# Patient Record
Sex: FEMALE | Race: OTHER | Employment: FULL TIME | URBAN - METROPOLITAN AREA
[De-identification: names, ages, dates, MRNs, and addresses within clinical notes are randomized per-mention and may not be internally consistent; named-entity substitution may affect disease eponyms.]

---

## 2017-02-08 ENCOUNTER — HOSPITAL ENCOUNTER (EMERGENCY)
Age: 52
Discharge: HOME | End: 2017-02-08
Attending: EMERGENCY MEDICINE
Payer: MEDICAID

## 2017-02-08 VITALS
WEIGHT: 211 LBS | SYSTOLIC BLOOD PRESSURE: 160 MMHG | DIASTOLIC BLOOD PRESSURE: 98 MMHG | HEART RATE: 84 BPM | HEIGHT: 62 IN | OXYGEN SATURATION: 96 % | RESPIRATION RATE: 18 BRPM | BODY MASS INDEX: 38.83 KG/M2 | TEMPERATURE: 99.3 F

## 2017-02-08 DIAGNOSIS — G89.29 CHRONIC BILATERAL LOW BACK PAIN WITHOUT SCIATICA: Primary | ICD-10-CM

## 2017-02-08 DIAGNOSIS — M54.50 CHRONIC BILATERAL LOW BACK PAIN WITHOUT SCIATICA: Primary | ICD-10-CM

## 2017-02-08 PROCEDURE — 99283 EMERGENCY DEPT VISIT LOW MDM: CPT

## 2017-02-08 RX ORDER — GABAPENTIN 300 MG/1
300 CAPSULE ORAL 3 TIMES DAILY
Qty: 30 CAPSULE | Refills: 0 | Status: SHIPPED | OUTPATIENT
Start: 2017-02-08 | End: 2017-10-18 | Stop reason: ALTCHOICE

## 2017-02-08 RX ORDER — TRAMADOL HYDROCHLORIDE 50 MG/1
50 TABLET ORAL EVERY 6 HOURS PRN
COMMUNITY
End: 2017-10-18 | Stop reason: ALTCHOICE

## 2017-02-08 RX ORDER — PREDNISONE 50 MG/1
50 TABLET ORAL DAILY
Qty: 5 TABLET | Refills: 0 | Status: SHIPPED | OUTPATIENT
Start: 2017-02-08 | End: 2017-02-13

## 2017-02-08 RX ORDER — CYCLOBENZAPRINE HCL 10 MG
10 TABLET ORAL 3 TIMES DAILY PRN
Qty: 30 TABLET | Refills: 0 | Status: SHIPPED | OUTPATIENT
Start: 2017-02-08 | End: 2017-02-18

## 2017-02-08 ASSESSMENT — PAIN DESCRIPTION - LOCATION: LOCATION: BACK

## 2017-02-08 ASSESSMENT — PAIN SCALES - GENERAL: PAINLEVEL_OUTOF10: 8

## 2017-02-08 ASSESSMENT — PAIN DESCRIPTION - DESCRIPTORS: DESCRIPTORS: ACHING

## 2017-10-18 ENCOUNTER — HOSPITAL ENCOUNTER (OUTPATIENT)
Age: 52
Discharge: HOME | End: 2017-10-18
Payer: MEDICAID

## 2017-10-18 ENCOUNTER — OFFICE VISIT (OUTPATIENT)
Dept: PRIMARY CARE CLINIC | Age: 52
End: 2017-10-18
Payer: MEDICAID

## 2017-10-18 VITALS
RESPIRATION RATE: 16 BRPM | HEIGHT: 62 IN | BODY MASS INDEX: 39.75 KG/M2 | WEIGHT: 216 LBS | SYSTOLIC BLOOD PRESSURE: 162 MMHG | HEART RATE: 82 BPM | OXYGEN SATURATION: 95 % | DIASTOLIC BLOOD PRESSURE: 104 MMHG

## 2017-10-18 DIAGNOSIS — E03.9 HYPOTHYROIDISM, ADULT: ICD-10-CM

## 2017-10-18 DIAGNOSIS — E78.2 HYPERLIPIDEMIA, MIXED: ICD-10-CM

## 2017-10-18 DIAGNOSIS — K76.0 NAFLD (NONALCOHOLIC FATTY LIVER DISEASE): ICD-10-CM

## 2017-10-18 DIAGNOSIS — K76.0 NONALCOHOLIC FATTY LIVER DISEASE WITHOUT NONALCOHOLIC STEATOHEPATITIS (NASH): ICD-10-CM

## 2017-10-18 DIAGNOSIS — E66.9 OBESITY (BMI 35.0-39.9 WITHOUT COMORBIDITY): Primary | ICD-10-CM

## 2017-10-18 DIAGNOSIS — Z13.1 ENCOUNTER FOR SCREENING FOR DIABETES MELLITUS: ICD-10-CM

## 2017-10-18 DIAGNOSIS — E66.9 OBESITY (BMI 35.0-39.9 WITHOUT COMORBIDITY): ICD-10-CM

## 2017-10-18 DIAGNOSIS — E27.8 ADRENAL MASS, LEFT (HCC): ICD-10-CM

## 2017-10-18 DIAGNOSIS — Z12.11 SCREENING FOR COLON CANCER: ICD-10-CM

## 2017-10-18 DIAGNOSIS — Z12.31 ENCOUNTER FOR SCREENING MAMMOGRAM FOR BREAST CANCER: ICD-10-CM

## 2017-10-18 DIAGNOSIS — I10 ESSENTIAL HYPERTENSION: ICD-10-CM

## 2017-10-18 LAB
ANION GAP SERPL CALCULATED.3IONS-SCNC: 15 MMOL/L (ref 9–17)
BUN BLDV-MCNC: 16 MG/DL (ref 6–20)
BUN/CREAT BLD: ABNORMAL (ref 9–20)
CALCIUM SERPL-MCNC: 9.1 MG/DL (ref 8.6–10.4)
CHLORIDE BLD-SCNC: 103 MMOL/L (ref 98–107)
CHOLESTEROL, FASTING: 278 MG/DL
CHOLESTEROL/HDL RATIO: 5.7
CO2: 23 MMOL/L (ref 20–31)
CREAT SERPL-MCNC: 0.62 MG/DL (ref 0.5–0.9)
ESTIMATED AVERAGE GLUCOSE: 108 MG/DL
GFR AFRICAN AMERICAN: >60 ML/MIN
GFR NON-AFRICAN AMERICAN: >60 ML/MIN
GFR SERPL CREATININE-BSD FRML MDRD: ABNORMAL ML/MIN/{1.73_M2}
GFR SERPL CREATININE-BSD FRML MDRD: ABNORMAL ML/MIN/{1.73_M2}
GLUCOSE BLD-MCNC: 100 MG/DL (ref 70–99)
GLUCOSE FASTING: 100 MG/DL (ref 70–99)
HBA1C MFR BLD: 5.4 % (ref 4–6)
HDLC SERPL-MCNC: 49 MG/DL
LDL CHOLESTEROL: 167 MG/DL (ref 0–130)
POTASSIUM SERPL-SCNC: 4.5 MMOL/L (ref 3.7–5.3)
SODIUM BLD-SCNC: 141 MMOL/L (ref 135–144)
TRIGLYCERIDE, FASTING: 310 MG/DL
VLDLC SERPL CALC-MCNC: ABNORMAL MG/DL (ref 1–30)

## 2017-10-18 PROCEDURE — 80061 LIPID PANEL: CPT

## 2017-10-18 PROCEDURE — 99204 OFFICE O/P NEW MOD 45 MIN: CPT | Performed by: INTERNAL MEDICINE

## 2017-10-18 PROCEDURE — 36415 COLL VENOUS BLD VENIPUNCTURE: CPT

## 2017-10-18 PROCEDURE — 80048 BASIC METABOLIC PNL TOTAL CA: CPT

## 2017-10-18 PROCEDURE — 83036 HEMOGLOBIN GLYCOSYLATED A1C: CPT

## 2017-10-18 RX ORDER — LEVOTHYROXINE SODIUM 0.05 MG/1
50 TABLET ORAL DAILY
Qty: 30 TABLET | Refills: 3 | Status: SHIPPED | OUTPATIENT
Start: 2017-10-18 | End: 2018-04-26 | Stop reason: SDUPTHER

## 2017-10-18 RX ORDER — EZETIMIBE AND SIMVASTATIN 10; 20 MG/1; MG/1
1 TABLET ORAL NIGHTLY
Qty: 30 TABLET | Refills: 3 | Status: SHIPPED | OUTPATIENT
Start: 2017-10-18 | End: 2018-05-03

## 2017-10-18 RX ORDER — METOPROLOL TARTRATE 50 MG/1
50 TABLET, FILM COATED ORAL 2 TIMES DAILY
Qty: 60 TABLET | Refills: 3 | Status: SHIPPED | OUTPATIENT
Start: 2017-10-18 | End: 2018-11-01 | Stop reason: SDUPTHER

## 2017-10-18 ASSESSMENT — ENCOUNTER SYMPTOMS
VOICE CHANGE: 0
SHORTNESS OF BREATH: 0
CONSTIPATION: 0
APNEA: 0
BACK PAIN: 0
EYE PAIN: 0
FACIAL SWELLING: 0
VOMITING: 0
DIARRHEA: 0
TROUBLE SWALLOWING: 0
NAUSEA: 0
COUGH: 0
COLOR CHANGE: 0
ABDOMINAL PAIN: 0

## 2017-10-18 ASSESSMENT — PATIENT HEALTH QUESTIONNAIRE - PHQ9
SUM OF ALL RESPONSES TO PHQ9 QUESTIONS 1 & 2: 0
2. FEELING DOWN, DEPRESSED OR HOPELESS: 0
1. LITTLE INTEREST OR PLEASURE IN DOING THINGS: 0
SUM OF ALL RESPONSES TO PHQ QUESTIONS 1-9: 0

## 2017-10-18 NOTE — PROGRESS NOTES
explained to patient. Orders Placed This Encounter   Procedures    GAMALIEL Digital Screen Bilateral [[id number]]     Standing Status:   Future     Standing Expiration Date:   10/18/2018    Glucose, Fasting     Standing Status:   Future     Number of Occurrences:   1     Standing Expiration Date:   10/18/2018    Lipid, Fasting     Standing Status:   Future     Number of Occurrences:   1     Standing Expiration Date:   10/18/2018    Hemoglobin A1C - NOT COVERED /DO NOT USE FOR MEDICARE PATIENTS     Standing Status:   Future     Number of Occurrences:   1     Standing Expiration Date:   10/18/2018    TSH with Reflex    Basic Metabolic Panel     Standing Status:   Future     Number of Occurrences:   1     Standing Expiration Date:   10/18/2018    POCT FECAL IMMUNOCHEMICAL TEST (FIT)     Standing Status:   Future     Standing Expiration Date:   10/18/2018       Counselling/Preventive measures:    Patient does have questions or concerns. We discussed exercise  and efforts to strengthen spine and peripheral joints on a regular basis. Goals for today's visit include ability to engage in daily activities, decrease health care utilization. Decision Making Process : Patient's health history and referral records thoroughly reviewed before focused physical examination and discussion with patient. Over 50% of today's visit is spent on examining the patient and counseling. Level of complexity of date to be reviewed is Moderate. The chart date reviewed include the following: Imaging Reports. Summary of Care. Time spent reviewing with patient the below reports:   Medication safety, Treatment options. Level of diagnosis and management options of this case is multiple: involving the following management options: Interventions as needed, medication management as appropriate, future visits, activity modification, heat/ice as needed, Urine drug screen as required.      Lorie received counseling on the following healthy behaviors: nutrition, exercise and medication adherence    Discussed use, benefit, and side effects of prescribed medications. Barriers to medication compliance addressed. All patient questions answered. Pt voiced understanding. Barriers to medication compliance addressed. Return in  weeks with Justo Page M.D., for Re-evaluation and further plan of treatment.      Asia Monreal M.D.      10/18/2017, 1:19 PM

## 2017-10-19 ENCOUNTER — TELEPHONE (OUTPATIENT)
Dept: PRIMARY CARE CLINIC | Age: 52
End: 2017-10-19

## 2017-10-19 NOTE — TELEPHONE ENCOUNTER
Pt called the office stating that the Vytorin is not covered under Insurance and needs a different medication sent to the pharmacy

## 2017-10-24 ENCOUNTER — TELEPHONE (OUTPATIENT)
Dept: PRIMARY CARE CLINIC | Age: 52
End: 2017-10-24

## 2017-10-24 NOTE — TELEPHONE ENCOUNTER
I advise to make ania with Dr Harpreet Rose sooner this wk   Recommend her to take prilosec 20 mg bid po OTC.

## 2017-10-26 ENCOUNTER — HOSPITAL ENCOUNTER (OUTPATIENT)
Dept: MAMMOGRAPHY | Age: 52
Discharge: HOME | End: 2017-10-26
Payer: MEDICAID

## 2017-10-26 DIAGNOSIS — Z12.31 ENCOUNTER FOR SCREENING MAMMOGRAM FOR BREAST CANCER: ICD-10-CM

## 2017-10-26 PROCEDURE — G0202 SCR MAMMO BI INCL CAD: HCPCS

## 2017-11-02 ENCOUNTER — OFFICE VISIT (OUTPATIENT)
Dept: PRIMARY CARE CLINIC | Age: 52
End: 2017-11-02
Payer: MEDICAID

## 2017-11-02 VITALS
BODY MASS INDEX: 39.93 KG/M2 | DIASTOLIC BLOOD PRESSURE: 82 MMHG | WEIGHT: 217 LBS | SYSTOLIC BLOOD PRESSURE: 138 MMHG | RESPIRATION RATE: 18 BRPM | OXYGEN SATURATION: 96 % | HEART RATE: 110 BPM | HEIGHT: 62 IN

## 2017-11-02 DIAGNOSIS — E03.9 HYPOTHYROIDISM, ADULT: ICD-10-CM

## 2017-11-02 DIAGNOSIS — E78.2 HYPERLIPIDEMIA, MIXED: ICD-10-CM

## 2017-11-02 DIAGNOSIS — K76.0 NONALCOHOLIC FATTY LIVER DISEASE WITHOUT NONALCOHOLIC STEATOHEPATITIS (NASH): Primary | ICD-10-CM

## 2017-11-02 DIAGNOSIS — M46.1 SACROILIITIS (HCC): ICD-10-CM

## 2017-11-02 DIAGNOSIS — I10 ESSENTIAL HYPERTENSION: ICD-10-CM

## 2017-11-02 PROCEDURE — 99214 OFFICE O/P EST MOD 30 MIN: CPT | Performed by: INTERNAL MEDICINE

## 2017-11-02 RX ORDER — SULINDAC 200 MG/1
200 TABLET ORAL 2 TIMES DAILY
Qty: 60 TABLET | Refills: 3 | Status: SHIPPED | OUTPATIENT
Start: 2017-11-02 | End: 2018-11-01 | Stop reason: ALTCHOICE

## 2017-11-02 RX ORDER — METOPROLOL SUCCINATE 100 MG/1
100 TABLET, EXTENDED RELEASE ORAL DAILY
Qty: 30 TABLET | Refills: 3 | Status: SHIPPED | OUTPATIENT
Start: 2017-11-02 | End: 2018-05-31 | Stop reason: SDUPTHER

## 2017-11-02 RX ORDER — EZETIMIBE 10 MG/1
10 TABLET ORAL DAILY
Qty: 30 TABLET | Refills: 3 | Status: SHIPPED | OUTPATIENT
Start: 2017-11-02 | End: 2018-11-01 | Stop reason: ALTCHOICE

## 2017-11-02 RX ORDER — SIMVASTATIN 20 MG
20 TABLET ORAL NIGHTLY
Qty: 30 TABLET | Refills: 3 | Status: SHIPPED | OUTPATIENT
Start: 2017-11-02 | End: 2018-11-01 | Stop reason: ALTCHOICE

## 2017-11-02 NOTE — PROGRESS NOTES
[address]  [address] [facility] [address]  Dept: [phone]  Dept Fax: [phone]    Dr. Gage Holloway    Progress Note    Date of patient's visit: 11/2/2017  YOB: 1965  Patient's Name:  Naveed Art    Patient Care Team:  Yanira Ulrich MD as PCP - General (Internal Medicine)    REASON FOR VISIT:   Patient is here to discuss the following:    Laboratory values and medications. HISTORY OF PRESENT ILLNESS:    History was obtained from the patient. Naveed Art is a 50 y.o. is here for a      Patient Active Problem List   Diagnosis    Essential hypertension    Hyperlipidemia, mixed    Hypothyroidism, adult    Nonalcoholic fatty liver disease without nonalcoholic steatohepatitis (LEIVA)    Obesity (BMI 35.0-39.9 without comorbidity)       HPI    ALLERGIES      Allergies   Allergen Reactions    Pcn [Penicillins]          MEDICATIONS:      Current Outpatient Prescriptions on File Prior to Visit   Medication Sig Dispense Refill    levothyroxine (SYNTHROID) 50 MCG tablet Take 1 tablet by mouth Daily 30 tablet 3    metoprolol tartrate (LOPRESSOR) 50 MG tablet Take 1 tablet by mouth 2 times daily Take one tablet two times a day 12 hours apart for high blood pressure. 60 tablet 3    ezetimibe-simvastatin (VYTORIN) 10-20 MG per tablet Take 1 tablet by mouth nightly 30 tablet 3     No current facility-administered medications on file prior to visit. SOCIAL HISTORY    Reviewed and no change from previous record. [employer]  reports that she has been smoking Cigarettes.   She has never used smokeless tobacco.    FAMILY HISTORY:    Reviewed and No change from previous visit    REVIEW OF SYSTEMS:    ENT: negative  Respiratory: no cough, shortness of breath, or wheezing  Cardiovascular: no chest pain or dyspnea on exertion  Gastrointestinal: no abdominal pain, black or bloody stools  Neurological: no TIA or stroke symptoms  Endocrine: negative  Genito-Urinary: no dysuria, trouble voiding, or hematuria  Musculoskeletal: negative  Dermatological: negative    Review of Systems    PHYSICAL EXAM:      Vitals:    11/02/17 0948   BP: 138/82   Pulse:    Resp:    SpO2:        General appearance - oriented to person, place, and time and in mild to moderate distress  Mental status - alert, oriented to person, place, and time, affect appropriate to mood  Eyes - pupils equal and reactive, extraocular eye movements intact  Back exam - limited range of motion, pain with motion noted during exam, tenderness noted over both sacro iliac joints left worse than the right side;   sensory exam intact bilateral lower extremities  Neurological - alert, oriented, normal speech, no focal findings or movement disorder noted, neck supple without rigidity, motor and sensory grossly normal bilaterally  Musculoskeletal - abnormal exam of right sacro iliac joint and , abnormal exam of left sacro iliac joint. Physical Exam   Musculoskeletal:       Inspection of the sacrum shows the skin to be intact  without any lesions. Palpation  shows tenderness over bilateral  sacroiliac joints. Hip flexion, abduction and external rotation are painful and restricted. bilateral     Jay Jay's test is positive bilateral with reproduction of some of the low back pain. No sensory or motor deficits noted. Tone and muscle strength in lower extremities is normal.     No muscle atrophy noted. Deep tendon reflexes are normal in the lower extremities without hyper reflexia. Skin:          Extremities - no pedal edema noted.     LABORATORY FINDINGS:    CBC:No results found for: WBC, HGB, PLT  BMP:    Lab Results   Component Value Date     10/18/2017    K 4.5 10/18/2017     10/18/2017    CO2 23 10/18/2017    BUN 16 10/18/2017    CREATININE 0.62 10/18/2017    GLUCOSE 100 10/18/2017       HEMOGLOBIN A1C:   Lab Results   Component Value Date LABA1C 5.4 10/18/2017       MICROALBUMIN URINE: No results found for: MICROALBUR    FASTING LIPID PANEL:  Lab Results   Component Value Date    HDL 49 10/18/2017       LIVER PROFILE:No results found for: ALT, AST, PROT, BILITOT, BILIDIR, LABALBU     THYROID FUNCTION: No results found for: TSH     URINE ANALYSIS: No results found for: LABURIN    EKG:  No prior ECG    IMAGING:  Imaging studies have been reviewed in the computerized chart. ASSESSMENT AND PLAN:        1. Nonalcoholic fatty liver disease without nonalcoholic steatohepatitis (LEIVA)    She is making positive changes in her diet, by decreasing carbohydrates. - Lipid Panel    2. Hypothyroidism, adult    She is on supplements. 3. Hyperlipidemia, mixed    We are prescribing again as her previous prescription was not covered. 4. Essential hypertension    Will change her twice a day medication to once a day long acting.   - Lipid Panel    5. Sacroiliitis ([address]    Will start her on prescription NSAID Clinoril 200 mg once a day along with PT. She is already quite active as she manages several tasks at a [employer] and is active on her feet all day. She has also tried activity modifications and stretching exercises without relief. She has tried and is taking OTC analgesics including OTC NSAIDS without any relief. If no improvement will consider pain clinic referral and possible bilateral sacro iliac joint injections.     - PT eval and treat; Future    1. Nonalcoholic fatty liver disease without nonalcoholic steatohepatitis (LEIVA)  Lipid Panel   2. Hypothyroidism, adult     3. Hyperlipidemia, mixed     4. Essential hypertension  Lipid Panel   5. Sacroiliitis ([facility] [address]  PT eval and treat        Treatment Plan: We will continue current medications. Following changes are made to her Rx.     Orders Placed This Encounter   Medications    metoprolol succinate (TOPROL XL) 100 MG extended release tablet     Sig: Take 1 tablet by mouth daily received counseling on the following healthy behaviors: nutrition, exercise and medication adherence         She received counseling on the following healthy behaviors:     1. Medication adherence  2. Patient given educational materials - see patient instructions  3. Discussed use, benefit, and side effects of prescribed medications. Barriers to      medication compliance addressed. All patient questions answered. Pt voiced      understanding. 4.  Reviewed prior labs and health maintenance  5. Continue current medications, diet and exercise. Return in  THREE months with Terry Sweeney M.D., for Re-evaluation and further plan of treatment. Meanwhile I will refer her to the Pain clinic as if she is quite symptomatic with bilateral sacro iliac joint pain. She will meanwhile try PT and continue NSAIDS. She continues to remain physically active.      Carmen Osei M.D.      11/2/2017, 10:32 AM

## 2017-11-16 ENCOUNTER — TELEPHONE (OUTPATIENT)
Dept: PRIMARY CARE CLINIC | Age: 52
End: 2017-11-16

## 2018-01-18 ENCOUNTER — TELEPHONE (OUTPATIENT)
Dept: PRIMARY CARE CLINIC | Age: 53
End: 2018-01-18

## 2018-01-18 ENCOUNTER — OFFICE VISIT (OUTPATIENT)
Dept: PRIMARY CARE CLINIC | Age: 53
End: 2018-01-18
Payer: MEDICAID

## 2018-01-18 VITALS
RESPIRATION RATE: 16 BRPM | HEART RATE: 68 BPM | BODY MASS INDEX: 39.2 KG/M2 | DIASTOLIC BLOOD PRESSURE: 78 MMHG | HEIGHT: 62 IN | OXYGEN SATURATION: 97 % | SYSTOLIC BLOOD PRESSURE: 134 MMHG | WEIGHT: 213 LBS

## 2018-01-18 DIAGNOSIS — I10 ESSENTIAL HYPERTENSION: ICD-10-CM

## 2018-01-18 DIAGNOSIS — E78.2 HYPERLIPIDEMIA, MIXED: ICD-10-CM

## 2018-01-18 DIAGNOSIS — E66.9 OBESITY (BMI 35.0-39.9 WITHOUT COMORBIDITY): ICD-10-CM

## 2018-01-18 DIAGNOSIS — N95.1 VASOMOTOR SYMPTOMS DUE TO MENOPAUSE: ICD-10-CM

## 2018-01-18 DIAGNOSIS — E78.2 MIXED HYPERLIPIDEMIA: Primary | ICD-10-CM

## 2018-01-18 DIAGNOSIS — K76.0 NONALCOHOLIC FATTY LIVER DISEASE WITHOUT NONALCOHOLIC STEATOHEPATITIS (NASH): ICD-10-CM

## 2018-01-18 PROCEDURE — G8484 FLU IMMUNIZE NO ADMIN: HCPCS | Performed by: INTERNAL MEDICINE

## 2018-01-18 PROCEDURE — G8417 CALC BMI ABV UP PARAM F/U: HCPCS | Performed by: INTERNAL MEDICINE

## 2018-01-18 PROCEDURE — G8427 DOCREV CUR MEDS BY ELIG CLIN: HCPCS | Performed by: INTERNAL MEDICINE

## 2018-01-18 PROCEDURE — 4004F PT TOBACCO SCREEN RCVD TLK: CPT | Performed by: INTERNAL MEDICINE

## 2018-01-18 PROCEDURE — 99214 OFFICE O/P EST MOD 30 MIN: CPT | Performed by: INTERNAL MEDICINE

## 2018-01-18 PROCEDURE — 3017F COLORECTAL CA SCREEN DOC REV: CPT | Performed by: INTERNAL MEDICINE

## 2018-01-18 RX ORDER — CLONIDINE HYDROCHLORIDE 0.1 MG/1
0.1 TABLET ORAL NIGHTLY
Qty: 30 TABLET | Refills: 3 | Status: SHIPPED | OUTPATIENT
Start: 2018-01-18 | End: 2018-05-03

## 2018-01-18 RX ORDER — TOPIRAMATE 25 MG/1
25 TABLET ORAL 2 TIMES DAILY
Qty: 60 TABLET | Refills: 3 | Status: SHIPPED | OUTPATIENT
Start: 2018-01-18 | End: 2018-05-03 | Stop reason: ALTCHOICE

## 2018-01-18 NOTE — PATIENT INSTRUCTIONS
Patient Education        Hot Flashes During Menopause: Care Instructions  Your Care Instructions    A hot flash is a sudden feeling of intense body heat. Your head, neck, and chest may get red. Your heartbeat may speed up, and you may feel anxious or irritable. You may find that hot flashes occur more often in warm rooms or during stressful times. Hot flashes and other symptoms are a normal response to the hormone changes that occur before your menstrual cycle goes away completely (menopause). Hot flashes often get better and go away with time. Making a few changes, such as exercising more, practicing meditation, quitting smoking, and drinking less alcohol, can help. Some women take hormone pills or other medicine to treat bothersome symptoms. Follow-up care is a key part of your treatment and safety. Be sure to make and go to all appointments, and call your doctor if you are having problems. It's also a good idea to know your test results and keep a list of the medicines you take. How can you care for yourself at home? · If you decide to take medicine to treat hot flashes, take it exactly as prescribed. Call your doctor if you think you are having a problem with your medicine. You will get more details on the specific medicine your doctor prescribes. · Learn to meditate. Sit quietly and focus on your breathing. Try to practice each day. Books, classes, and tapes can help you start a program.  · Wear natural fabrics, such as cotton and silk. Dress in layers so you can take off clothes as needed. · Keep the room temperature cool, or use a fan. You are more likely to have a hot flash when you are too warm than when you are cool. · Use fewer blankets when you sleep at night. · Drink cold fluids rather than hot ones. Limit your intake of caffeine and alcohol. · Eat smaller meals more often during the day so your body makes less heat than when digesting large amounts of food.  Eat low-fat and high-fiber foods.  · Do not smoke. Smoking can make hot flashes worse. If you need help quitting, talk to your doctor about stop-smoking programs and medicines. These can increase your chances of quitting for good. · Get at least 30 minutes of exercise on most days of the week. Walking is a good choice. You also may want to do other activities, such as running, swimming, cycling, or playing tennis or team sports. Where can you learn more? Go to https://[employer] Automationpejimmie.Lettuce Eat. org and sign in to your CogniSens account. Enter F700 in the adQuota box to learn more about \"Hot Flashes During Menopause: Care Instructions. \"     If you do not have an account, please click on the \"Sign Up Now\" link. Current as of: October 13, 2016  Content Version: 11.5  © 0716-6363 [employer]. Care instructions adapted under license by [address] [place] [address]. If you have questions about a medical condition or this instruction, always ask your healthcare professional. [place] [address] any warranty or liability for your use of this information. For menopausal symptoms: Estroven from OTC look for sleep/menopause. Electronically signed by Lyndsey Thompson MD on 1/18/2018 at 11:02 AM  Patient Education        Hot Flashes During Menopause: Care Instructions  Your Care Instructions    A hot flash is a sudden feeling of intense body heat. Your head, neck, and chest may get red. Your heartbeat may speed up, and you may feel anxious or irritable. You may find that hot flashes occur more often in warm rooms or during stressful times. Hot flashes and other symptoms are a normal response to the hormone changes that occur before your menstrual cycle goes away completely (menopause). Hot flashes often get better and go away with time. Making a few changes, such as exercising more, practicing meditation, quitting smoking, and drinking less alcohol, can help.  Some women take hormone pills or other medicine to treat bothersome symptoms. Follow-up care is a key part of your treatment and safety. Be sure to make and go to all appointments, and call your doctor if you are having problems. It's also a good idea to know your test results and keep a list of the medicines you take. How can you care for yourself at home? · If you decide to take medicine to treat hot flashes, take it exactly as prescribed. Call your doctor if you think you are having a problem with your medicine. You will get more details on the specific medicine your doctor prescribes. · Learn to meditate. Sit quietly and focus on your breathing. Try to practice each day. Books, classes, and tapes can help you start a program.  · Wear natural fabrics, such as cotton and silk. Dress in layers so you can take off clothes as needed. · Keep the room temperature cool, or use a fan. You are more likely to have a hot flash when you are too warm than when you are cool. · Use fewer blankets when you sleep at night. · Drink cold fluids rather than hot ones. Limit your intake of caffeine and alcohol. · Eat smaller meals more often during the day so your body makes less heat than when digesting large amounts of food. Eat low-fat and high-fiber foods. · Do not smoke. Smoking can make hot flashes worse. If you need help quitting, talk to your doctor about stop-smoking programs and medicines. These can increase your chances of quitting for good. · Get at least 30 minutes of exercise on most days of the week. Walking is a good choice. You also may want to do other activities, such as running, swimming, cycling, or playing tennis or team sports. Where can you learn more? Go to https://ana.anchor.travel. org and sign in to your O[employer]x account. Enter F700 in the FooPets box to learn more about \"Hot Flashes During Menopause: Care Instructions. \"     If you do not have an account, please click on the \"Sign Up Now\" link.   Current as of: October

## 2018-01-18 NOTE — PROGRESS NOTES
Temperature 100.5 Degrees or Greater      ALLERGIES      Allergies   Allergen Reactions    Pcn [Penicillins]          MEDICATIONS:      Current Outpatient Prescriptions on File Prior to Visit   Medication Sig Dispense Refill    metoprolol succinate (TOPROL XL) 100 MG extended release tablet Take 1 tablet by mouth daily 30 tablet 3    ezetimibe (ZETIA) 10 MG tablet Take 1 tablet by mouth daily 30 tablet 3    sulindac (CLINORIL) 200 MG tablet Take 1 tablet by mouth 2 times daily 60 tablet 3    levothyroxine (SYNTHROID) 50 MCG tablet Take 1 tablet by mouth Daily 30 tablet 3    simvastatin (ZOCOR) 20 MG tablet Take 1 tablet by mouth nightly 30 tablet 3    ezetimibe-simvastatin (VYTORIN) 10-20 MG per tablet Take 1 tablet by mouth nightly 30 tablet 3    metoprolol tartrate (LOPRESSOR) 50 MG tablet Take 1 tablet by mouth 2 times daily Take one tablet two times a day 12 hours apart for high blood pressure. 60 tablet 3     No current facility-administered medications on file prior to visit. SOCIAL HISTORY    Reviewed and no change from previous record. Irma Augustin  reports that she has been smoking Cigarettes. She has never used smokeless tobacco.    FAMILY HISTORY:    Reviewed and No change from previous visit    REVIEW OF SYSTEMS:    ENT: negative  Respiratory: no cough, shortness of breath, or wheezing  Cardiovascular: no chest pain or dyspnea on exertion  Gastrointestinal: no abdominal pain, black or bloody stools  Neurological: no TIA or stroke symptoms  Endocrine: negative  Genito-Urinary: no dysuria, trouble voiding, or hematuria. Hot flashes interrupting sleep. Musculoskeletal: positive for intermittent back pain.    Dermatological: negative    Review of Systems    PHYSICAL EXAM:      Vitals:    01/18/18 1027   BP: 134/78   Pulse: 68   Resp: 16   SpO2: 97%       General appearance - alert, well appearing, and in no distress, oriented to person, place, and time and overweight  Mental status - alert, oriented to person, place, and time, normal mood, behavior, speech, dress, motor activity, and thought processes, affect appropriate to mood  Neck - supple, no significant adenopathy, carotids upstroke normal bilaterally, no bruits  Chest - clear to auscultation, no wheezes, rales or rhonchi, symmetric air entry  Heart - normal rate, regular rhythm, normal S1, S2, no murmurs, rubs, clicks or gallops, normal rate and regular rhythm  Abdomen - soft, nontender, nondistended, no masses or organomegaly  no abdominal bruits  no CVA tenderness  no inguinal adenopathy  Back exam - full range of motion,Mild tenderness, Mild palpable spasm Mild pain on motion, limited range of motion, sacroiliac joints and sciatic notches nontender, negative straight-leg raise bilaterally  Neurological - alert, oriented, normal speech, no focal findings or movement disorder noted, neck supple without rigidity, cranial nerves II through XII intact, DTR's normal and symmetric, motor and sensory grossly normal bilaterally, normal muscle tone, no tremors, strength 5/5  Musculoskeletal - no joint tenderness, deformity or swelling, no muscular tenderness noted, full range of motion without pain  Extremities - no pedal edema noted, feet normal, good pulses, normal color, temperature and sensation, monofilament sensory exam is normal in both feet  Skin - normal coloration and turgor, no rashes, no suspicious skin lesions noted    Physical Exam    LABORATORY FINDINGS:    CBC:No results found for: WBC, HGB, PLT  BMP:    Lab Results   Component Value Date     10/18/2017    K 4.5 10/18/2017     10/18/2017    CO2 23 10/18/2017    BUN 16 10/18/2017    CREATININE 0.62 10/18/2017    GLUCOSE 100 10/18/2017       HEMOGLOBIN A1C:   Lab Results   Component Value Date    LABA1C 5.4 10/18/2017       MICROALBUMIN URINE: No results found for: MICROALBUR    FASTING LIPID PANEL:  Lab Results   Component Value Date    HDL 49 10/18/2017       LIVER PROFILE:No results Patient's health history and referral records thoroughly reviewed before focused physical examination and discussion with patient. Over 50% of today's visit is spent on examining the patient and counseling. Level of complexity of date to be reviewed is Moderate. The chart date reviewed include the following: Imaging Reports. Summary of Care. Time spent reviewing with patient the below reports:   Medication safety, Treatment options. Level of diagnosis and management options of this case is multiple: involving the following management options: Interventions as needed, medication management as appropriate, future visits, activity modification, heat/ice as needed, Urine drug screen as required. Orders Placed This Encounter   Procedures    Lipid Panel     Standing Status:   Future     Standing Expiration Date:   1/19/2019     Order Specific Question:   Is Patient Fasting?/# of Hours     Answer:   8             Lorie received counseling on the following healthy behaviors: nutrition, exercise and medication adherence         Shereceived counseling on the following healthy behaviors:     1. Medication adherence  2. Patient given educational materials - see patient instructions  3. Discussed use, benefit, and side effects of prescribed medications. Barriers to      medication compliance addressed. All patient questions answered. Pt voiced      understanding. 4.  Reviewed prior labs and health maintenance  5. Continue current medications, diet and exercise. Return in  3 months    with Brittany Stone M.D., for Re-evaluation and further plan of treatment.      Hamida Hoover M.D.      1/18/2018, 1:56 PM

## 2018-04-26 ENCOUNTER — OFFICE VISIT (OUTPATIENT)
Dept: PRIMARY CARE CLINIC | Age: 53
End: 2018-04-26
Payer: MEDICAID

## 2018-04-26 VITALS
HEART RATE: 110 BPM | SYSTOLIC BLOOD PRESSURE: 118 MMHG | TEMPERATURE: 98.7 F | BODY MASS INDEX: 38.83 KG/M2 | WEIGHT: 211 LBS | DIASTOLIC BLOOD PRESSURE: 72 MMHG | HEIGHT: 62 IN

## 2018-04-26 DIAGNOSIS — E03.9 HYPOTHYROIDISM, ADULT: Primary | ICD-10-CM

## 2018-04-26 DIAGNOSIS — J06.9 UPPER RESPIRATORY TRACT INFECTION, UNSPECIFIED TYPE: ICD-10-CM

## 2018-04-26 DIAGNOSIS — R53.83 FATIGUE, UNSPECIFIED TYPE: ICD-10-CM

## 2018-04-26 PROCEDURE — 99213 OFFICE O/P EST LOW 20 MIN: CPT | Performed by: INTERNAL MEDICINE

## 2018-04-26 PROCEDURE — 4004F PT TOBACCO SCREEN RCVD TLK: CPT | Performed by: INTERNAL MEDICINE

## 2018-04-26 PROCEDURE — G8417 CALC BMI ABV UP PARAM F/U: HCPCS | Performed by: INTERNAL MEDICINE

## 2018-04-26 PROCEDURE — G8427 DOCREV CUR MEDS BY ELIG CLIN: HCPCS | Performed by: INTERNAL MEDICINE

## 2018-04-26 PROCEDURE — 3017F COLORECTAL CA SCREEN DOC REV: CPT | Performed by: INTERNAL MEDICINE

## 2018-04-26 RX ORDER — LEVOTHYROXINE SODIUM 0.05 MG/1
50 TABLET ORAL DAILY
Qty: 30 TABLET | Refills: 3 | Status: SHIPPED | OUTPATIENT
Start: 2018-04-26 | End: 2019-01-24 | Stop reason: ALTCHOICE

## 2018-04-26 RX ORDER — AZITHROMYCIN 250 MG/1
TABLET, FILM COATED ORAL
Qty: 1 PACKET | Refills: 0 | Status: SHIPPED | OUTPATIENT
Start: 2018-04-26 | End: 2018-04-30

## 2018-04-26 RX ORDER — GUAIFENESIN AND CODEINE PHOSPHATE 100; 10 MG/5ML; MG/5ML
5 SOLUTION ORAL 4 TIMES DAILY PRN
Qty: 100 ML | Refills: 1 | Status: SHIPPED | OUTPATIENT
Start: 2018-04-26 | End: 2018-10-30 | Stop reason: ALTCHOICE

## 2018-04-26 ASSESSMENT — ENCOUNTER SYMPTOMS
WHEEZING: 1
ABDOMINAL PAIN: 0
CHEST TIGHTNESS: 0
EYE REDNESS: 0
SORE THROAT: 1
COUGH: 1
SHORTNESS OF BREATH: 1

## 2018-05-03 ENCOUNTER — OFFICE VISIT (OUTPATIENT)
Dept: PRIMARY CARE CLINIC | Age: 53
End: 2018-05-03
Payer: MEDICAID

## 2018-05-03 VITALS
DIASTOLIC BLOOD PRESSURE: 86 MMHG | TEMPERATURE: 98.1 F | BODY MASS INDEX: 38.59 KG/M2 | HEART RATE: 72 BPM | WEIGHT: 211 LBS | SYSTOLIC BLOOD PRESSURE: 134 MMHG | OXYGEN SATURATION: 96 %

## 2018-05-03 DIAGNOSIS — E66.9 OBESITY (BMI 35.0-39.9 WITHOUT COMORBIDITY): ICD-10-CM

## 2018-05-03 DIAGNOSIS — K76.0 NONALCOHOLIC FATTY LIVER DISEASE WITHOUT NONALCOHOLIC STEATOHEPATITIS (NASH): Primary | ICD-10-CM

## 2018-05-03 PROCEDURE — G8417 CALC BMI ABV UP PARAM F/U: HCPCS | Performed by: INTERNAL MEDICINE

## 2018-05-03 PROCEDURE — 3017F COLORECTAL CA SCREEN DOC REV: CPT | Performed by: INTERNAL MEDICINE

## 2018-05-03 PROCEDURE — 4004F PT TOBACCO SCREEN RCVD TLK: CPT | Performed by: INTERNAL MEDICINE

## 2018-05-03 PROCEDURE — G8427 DOCREV CUR MEDS BY ELIG CLIN: HCPCS | Performed by: INTERNAL MEDICINE

## 2018-05-03 PROCEDURE — 99214 OFFICE O/P EST MOD 30 MIN: CPT | Performed by: INTERNAL MEDICINE

## 2018-05-03 RX ORDER — LISINOPRIL 10 MG/1
10 TABLET ORAL DAILY
Qty: 90 TABLET | Refills: 0 | Status: SHIPPED | OUTPATIENT
Start: 2018-05-03 | End: 2018-05-31 | Stop reason: SDUPTHER

## 2018-05-03 RX ORDER — SIMVASTATIN 20 MG
20 TABLET ORAL NIGHTLY
Qty: 90 TABLET | Refills: 3 | Status: SHIPPED | OUTPATIENT
Start: 2018-05-03 | End: 2018-06-01 | Stop reason: SDUPTHER

## 2018-05-03 RX ORDER — ALBUTEROL SULFATE 2.5 MG/3ML
SOLUTION RESPIRATORY (INHALATION)
COMMUNITY
Start: 2018-04-29 | End: 2018-11-29

## 2018-05-03 RX ORDER — PREDNISONE 20 MG/1
TABLET ORAL
COMMUNITY
Start: 2018-04-29 | End: 2018-10-30 | Stop reason: ALTCHOICE

## 2018-05-03 RX ORDER — DOXYCYCLINE 100 MG/1
CAPSULE ORAL
COMMUNITY
Start: 2018-04-29 | End: 2018-10-23 | Stop reason: ALTCHOICE

## 2018-05-25 DIAGNOSIS — M25.562 ACUTE PAIN OF LEFT KNEE: Primary | ICD-10-CM

## 2018-05-31 ENCOUNTER — HOSPITAL ENCOUNTER (OUTPATIENT)
Dept: ULTRASOUND IMAGING | Age: 53
Discharge: HOME | End: 2018-06-02
Payer: MEDICAID

## 2018-05-31 ENCOUNTER — HOSPITAL ENCOUNTER (OUTPATIENT)
Age: 53
Discharge: HOME | End: 2018-06-02
Payer: MEDICAID

## 2018-05-31 ENCOUNTER — HOSPITAL ENCOUNTER (OUTPATIENT)
Dept: GENERAL RADIOLOGY | Age: 53
Discharge: HOME | End: 2018-06-02
Payer: MEDICAID

## 2018-05-31 ENCOUNTER — OFFICE VISIT (OUTPATIENT)
Dept: PRIMARY CARE CLINIC | Age: 53
End: 2018-05-31
Payer: MEDICAID

## 2018-05-31 ENCOUNTER — HOSPITAL ENCOUNTER (OUTPATIENT)
Age: 53
Discharge: HOME | End: 2018-05-31
Payer: MEDICAID

## 2018-05-31 VITALS
HEIGHT: 62 IN | OXYGEN SATURATION: 96 % | WEIGHT: 218.6 LBS | RESPIRATION RATE: 16 BRPM | SYSTOLIC BLOOD PRESSURE: 138 MMHG | BODY MASS INDEX: 40.23 KG/M2 | HEART RATE: 87 BPM | DIASTOLIC BLOOD PRESSURE: 86 MMHG

## 2018-05-31 DIAGNOSIS — E78.2 HYPERLIPIDEMIA, MIXED: ICD-10-CM

## 2018-05-31 DIAGNOSIS — E03.9 HYPOTHYROIDISM, ADULT: ICD-10-CM

## 2018-05-31 DIAGNOSIS — R07.9 CHEST PAIN, UNSPECIFIED TYPE: ICD-10-CM

## 2018-05-31 DIAGNOSIS — M25.562 ACUTE PAIN OF LEFT KNEE: ICD-10-CM

## 2018-05-31 DIAGNOSIS — N28.89 RENAL MASS: ICD-10-CM

## 2018-05-31 DIAGNOSIS — I10 ESSENTIAL HYPERTENSION: ICD-10-CM

## 2018-05-31 DIAGNOSIS — N28.89 RENAL MASS: Primary | ICD-10-CM

## 2018-05-31 DIAGNOSIS — Z72.0 TOBACCO USE: ICD-10-CM

## 2018-05-31 LAB
ALBUMIN SERPL-MCNC: 3.8 G/DL (ref 3.5–5.2)
ALBUMIN/GLOBULIN RATIO: 1.3 (ref 1–2.5)
ALP BLD-CCNC: 62 U/L (ref 35–104)
ALT SERPL-CCNC: 19 U/L (ref 5–33)
ANION GAP SERPL CALCULATED.3IONS-SCNC: 11 MMOL/L (ref 9–17)
AST SERPL-CCNC: 20 U/L
BILIRUB SERPL-MCNC: 0.23 MG/DL (ref 0.3–1.2)
BUN BLDV-MCNC: 19 MG/DL (ref 6–20)
BUN/CREAT BLD: ABNORMAL (ref 9–20)
CALCIUM SERPL-MCNC: 8.9 MG/DL (ref 8.6–10.4)
CHLORIDE BLD-SCNC: 106 MMOL/L (ref 98–107)
CHOLESTEROL/HDL RATIO: 6.2
CHOLESTEROL: 254 MG/DL
CK MB: 1.7 NG/ML
CO2: 25 MMOL/L (ref 20–31)
CREAT SERPL-MCNC: 0.62 MG/DL (ref 0.5–0.9)
EKG ATRIAL RATE: 74 BPM
EKG P AXIS: 37 DEGREES
EKG P-R INTERVAL: 154 MS
EKG Q-T INTERVAL: 424 MS
EKG QRS DURATION: 90 MS
EKG QTC CALCULATION (BAZETT): 470 MS
EKG R AXIS: 46 DEGREES
EKG T AXIS: 44 DEGREES
EKG VENTRICULAR RATE: 74 BPM
GFR AFRICAN AMERICAN: >60 ML/MIN
GFR NON-AFRICAN AMERICAN: >60 ML/MIN
GFR SERPL CREATININE-BSD FRML MDRD: ABNORMAL ML/MIN/{1.73_M2}
GFR SERPL CREATININE-BSD FRML MDRD: ABNORMAL ML/MIN/{1.73_M2}
GLUCOSE BLD-MCNC: 98 MG/DL (ref 70–99)
HCT VFR BLD CALC: 41.7 % (ref 36.3–47.1)
HDLC SERPL-MCNC: 41 MG/DL
HEMOGLOBIN: 13.2 G/DL (ref 11.9–15.1)
LDL CHOLESTEROL DIRECT: 162 MG/DL
LDL CHOLESTEROL: ABNORMAL MG/DL (ref 0–130)
MCH RBC QN AUTO: 29.3 PG (ref 25.2–33.5)
MCHC RBC AUTO-ENTMCNC: 31.7 G/DL (ref 28.4–34.8)
MCV RBC AUTO: 92.5 FL (ref 82.6–102.9)
MYOGLOBIN: 22 NG/ML (ref 25–58)
NRBC AUTOMATED: 0 PER 100 WBC
PDW BLD-RTO: 13.3 % (ref 11.8–14.4)
PLATELET # BLD: 207 K/UL (ref 138–453)
PMV BLD AUTO: 11.8 FL (ref 8.1–13.5)
POTASSIUM SERPL-SCNC: 4.3 MMOL/L (ref 3.7–5.3)
RBC # BLD: 4.51 M/UL (ref 3.95–5.11)
SODIUM BLD-SCNC: 142 MMOL/L (ref 135–144)
TOTAL PROTEIN: 6.8 G/DL (ref 6.4–8.3)
TRIGL SERPL-MCNC: 465 MG/DL
TROPONIN INTERP: NORMAL
TROPONIN T: <0.03 NG/ML
TSH SERPL DL<=0.05 MIU/L-ACNC: 1.54 MIU/L (ref 0.3–5)
VLDLC SERPL CALC-MCNC: ABNORMAL MG/DL (ref 1–30)
WBC # BLD: 5.4 K/UL (ref 3.5–11.3)

## 2018-05-31 PROCEDURE — 85027 COMPLETE CBC AUTOMATED: CPT

## 2018-05-31 PROCEDURE — G8427 DOCREV CUR MEDS BY ELIG CLIN: HCPCS | Performed by: NURSE PRACTITIONER

## 2018-05-31 PROCEDURE — 99214 OFFICE O/P EST MOD 30 MIN: CPT | Performed by: NURSE PRACTITIONER

## 2018-05-31 PROCEDURE — 76775 US EXAM ABDO BACK WALL LIM: CPT

## 2018-05-31 PROCEDURE — 80053 COMPREHEN METABOLIC PANEL: CPT

## 2018-05-31 PROCEDURE — 80061 LIPID PANEL: CPT

## 2018-05-31 PROCEDURE — 73562 X-RAY EXAM OF KNEE 3: CPT

## 2018-05-31 PROCEDURE — 36415 COLL VENOUS BLD VENIPUNCTURE: CPT

## 2018-05-31 PROCEDURE — 84443 ASSAY THYROID STIM HORMONE: CPT

## 2018-05-31 PROCEDURE — 71046 X-RAY EXAM CHEST 2 VIEWS: CPT

## 2018-05-31 PROCEDURE — 84484 ASSAY OF TROPONIN QUANT: CPT

## 2018-05-31 PROCEDURE — G8417 CALC BMI ABV UP PARAM F/U: HCPCS | Performed by: NURSE PRACTITIONER

## 2018-05-31 PROCEDURE — 83721 ASSAY OF BLOOD LIPOPROTEIN: CPT

## 2018-05-31 PROCEDURE — 3017F COLORECTAL CA SCREEN DOC REV: CPT | Performed by: NURSE PRACTITIONER

## 2018-05-31 PROCEDURE — 82553 CREATINE MB FRACTION: CPT

## 2018-05-31 PROCEDURE — 4004F PT TOBACCO SCREEN RCVD TLK: CPT | Performed by: NURSE PRACTITIONER

## 2018-05-31 PROCEDURE — 93005 ELECTROCARDIOGRAM TRACING: CPT

## 2018-05-31 PROCEDURE — 83874 ASSAY OF MYOGLOBIN: CPT

## 2018-05-31 RX ORDER — METOPROLOL SUCCINATE 100 MG/1
100 TABLET, EXTENDED RELEASE ORAL DAILY
Qty: 30 TABLET | Refills: 3 | Status: SHIPPED | OUTPATIENT
Start: 2018-05-31 | End: 2018-11-01 | Stop reason: ALTCHOICE

## 2018-05-31 RX ORDER — VARENICLINE TARTRATE 0.5 MG/1
TABLET, FILM COATED ORAL
Qty: 95 TABLET | Refills: 0 | Status: SHIPPED | OUTPATIENT
Start: 2018-05-31 | End: 2018-10-04 | Stop reason: SDUPTHER

## 2018-05-31 RX ORDER — LISINOPRIL 10 MG/1
10 TABLET ORAL DAILY
Qty: 90 TABLET | Refills: 0 | Status: SHIPPED | OUTPATIENT
Start: 2018-05-31 | End: 2018-11-01 | Stop reason: ALTCHOICE

## 2018-05-31 ASSESSMENT — ENCOUNTER SYMPTOMS
ABDOMINAL PAIN: 0
COUGH: 0
SHORTNESS OF BREATH: 0
BACK PAIN: 0

## 2018-06-01 DIAGNOSIS — M25.562 ACUTE PAIN OF LEFT KNEE: Primary | ICD-10-CM

## 2018-06-01 RX ORDER — SIMVASTATIN 40 MG
40 TABLET ORAL NIGHTLY
Qty: 90 TABLET | Refills: 3 | Status: SHIPPED | OUTPATIENT
Start: 2018-06-01 | End: 2018-11-01 | Stop reason: SDUPTHER

## 2018-06-07 ENCOUNTER — HOSPITAL ENCOUNTER (OUTPATIENT)
Dept: PHYSICAL THERAPY | Facility: CLINIC | Age: 53
Setting detail: THERAPIES SERIES
Discharge: HOME | End: 2018-06-07
Payer: MEDICAID

## 2018-06-07 PROCEDURE — 97035 APP MDLTY 1+ULTRASOUND EA 15: CPT

## 2018-06-07 PROCEDURE — 97016 VASOPNEUMATIC DEVICE THERAPY: CPT

## 2018-06-07 PROCEDURE — 97161 PT EVAL LOW COMPLEX 20 MIN: CPT

## 2018-06-08 ENCOUNTER — HOSPITAL ENCOUNTER (OUTPATIENT)
Dept: PHYSICAL THERAPY | Facility: CLINIC | Age: 53
Setting detail: THERAPIES SERIES
Discharge: HOME | End: 2018-06-08
Payer: MEDICAID

## 2018-06-08 PROCEDURE — 97035 APP MDLTY 1+ULTRASOUND EA 15: CPT

## 2018-06-08 PROCEDURE — 97110 THERAPEUTIC EXERCISES: CPT

## 2018-06-08 PROCEDURE — 97016 VASOPNEUMATIC DEVICE THERAPY: CPT

## 2018-06-11 ENCOUNTER — OFFICE VISIT (OUTPATIENT)
Dept: PRIMARY CARE CLINIC | Age: 53
End: 2018-06-11
Payer: MEDICAID

## 2018-06-11 ENCOUNTER — HOSPITAL ENCOUNTER (OUTPATIENT)
Dept: PHYSICAL THERAPY | Facility: CLINIC | Age: 53
Setting detail: THERAPIES SERIES
Discharge: HOME | End: 2018-06-11
Payer: MEDICAID

## 2018-06-11 VITALS
HEIGHT: 62 IN | OXYGEN SATURATION: 97 % | BODY MASS INDEX: 40.37 KG/M2 | WEIGHT: 219.4 LBS | RESPIRATION RATE: 16 BRPM | DIASTOLIC BLOOD PRESSURE: 86 MMHG | SYSTOLIC BLOOD PRESSURE: 138 MMHG | HEART RATE: 79 BPM

## 2018-06-11 DIAGNOSIS — M25.532 LEFT WRIST PAIN: ICD-10-CM

## 2018-06-11 DIAGNOSIS — M25.562 ACUTE PAIN OF LEFT KNEE: Primary | ICD-10-CM

## 2018-06-11 PROCEDURE — 4004F PT TOBACCO SCREEN RCVD TLK: CPT | Performed by: NURSE PRACTITIONER

## 2018-06-11 PROCEDURE — G8427 DOCREV CUR MEDS BY ELIG CLIN: HCPCS | Performed by: NURSE PRACTITIONER

## 2018-06-11 PROCEDURE — 97016 VASOPNEUMATIC DEVICE THERAPY: CPT

## 2018-06-11 PROCEDURE — 3017F COLORECTAL CA SCREEN DOC REV: CPT | Performed by: NURSE PRACTITIONER

## 2018-06-11 PROCEDURE — 99214 OFFICE O/P EST MOD 30 MIN: CPT | Performed by: NURSE PRACTITIONER

## 2018-06-11 PROCEDURE — 97035 APP MDLTY 1+ULTRASOUND EA 15: CPT

## 2018-06-11 PROCEDURE — G8417 CALC BMI ABV UP PARAM F/U: HCPCS | Performed by: NURSE PRACTITIONER

## 2018-06-11 ASSESSMENT — ENCOUNTER SYMPTOMS
SHORTNESS OF BREATH: 0
COUGH: 0
ABDOMINAL PAIN: 0
BACK PAIN: 1

## 2018-06-12 ENCOUNTER — TELEPHONE (OUTPATIENT)
Dept: PRIMARY CARE CLINIC | Age: 53
End: 2018-06-12

## 2018-06-12 DIAGNOSIS — M25.562 ACUTE PAIN OF LEFT KNEE: Primary | ICD-10-CM

## 2018-06-12 RX ORDER — ACETAMINOPHEN AND CODEINE PHOSPHATE 300; 30 MG/1; MG/1
1 TABLET ORAL EVERY 8 HOURS PRN
Qty: 21 TABLET | Refills: 0 | Status: SHIPPED | OUTPATIENT
Start: 2018-06-12 | End: 2018-06-19

## 2018-06-13 ENCOUNTER — HOSPITAL ENCOUNTER (OUTPATIENT)
Dept: PHYSICAL THERAPY | Facility: CLINIC | Age: 53
Setting detail: THERAPIES SERIES
Discharge: HOME | End: 2018-06-13
Payer: MEDICAID

## 2018-06-13 PROCEDURE — G0283 ELEC STIM OTHER THAN WOUND: HCPCS

## 2018-06-13 PROCEDURE — 97016 VASOPNEUMATIC DEVICE THERAPY: CPT

## 2018-06-15 ENCOUNTER — HOSPITAL ENCOUNTER (OUTPATIENT)
Dept: PHYSICAL THERAPY | Facility: CLINIC | Age: 53
Setting detail: THERAPIES SERIES
Discharge: HOME | End: 2018-06-15
Payer: MEDICAID

## 2018-06-18 ENCOUNTER — HOSPITAL ENCOUNTER (OUTPATIENT)
Dept: PHYSICAL THERAPY | Facility: CLINIC | Age: 53
Setting detail: THERAPIES SERIES
Discharge: HOME | End: 2018-06-18
Payer: MEDICAID

## 2018-06-18 PROCEDURE — 97110 THERAPEUTIC EXERCISES: CPT

## 2018-06-18 PROCEDURE — G0283 ELEC STIM OTHER THAN WOUND: HCPCS

## 2018-06-18 PROCEDURE — 97016 VASOPNEUMATIC DEVICE THERAPY: CPT

## 2018-06-19 ENCOUNTER — HOSPITAL ENCOUNTER (OUTPATIENT)
Dept: MRI IMAGING | Age: 53
Discharge: HOME | End: 2018-06-21
Payer: MEDICAID

## 2018-06-19 DIAGNOSIS — M25.562 ACUTE PAIN OF LEFT KNEE: ICD-10-CM

## 2018-06-19 PROCEDURE — 73721 MRI JNT OF LWR EXTRE W/O DYE: CPT

## 2018-06-29 ENCOUNTER — HOSPITAL ENCOUNTER (OUTPATIENT)
Dept: PHYSICAL THERAPY | Facility: CLINIC | Age: 53
Setting detail: THERAPIES SERIES
End: 2018-06-29
Payer: MEDICAID

## 2018-10-04 ENCOUNTER — TELEPHONE (OUTPATIENT)
Dept: PRIMARY CARE CLINIC | Age: 53
End: 2018-10-04

## 2018-10-04 ENCOUNTER — OFFICE VISIT (OUTPATIENT)
Dept: PRIMARY CARE CLINIC | Age: 53
End: 2018-10-04
Payer: COMMERCIAL

## 2018-10-04 VITALS
HEART RATE: 96 BPM | SYSTOLIC BLOOD PRESSURE: 142 MMHG | RESPIRATION RATE: 15 BRPM | HEIGHT: 62 IN | WEIGHT: 219.8 LBS | DIASTOLIC BLOOD PRESSURE: 96 MMHG | OXYGEN SATURATION: 97 % | BODY MASS INDEX: 40.45 KG/M2
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
## 2018-10-04 DIAGNOSIS — B00.1 COLD SORE: ICD-10-CM

## 2018-10-04 DIAGNOSIS — Z72.0 TOBACCO USE: ICD-10-CM

## 2018-10-04 DIAGNOSIS — F33.2 SEVERE EPISODE OF RECURRENT MAJOR DEPRESSIVE DISORDER, WITHOUT PSYCHOTIC FEATURES (HCC): Primary | ICD-10-CM

## 2018-10-04 PROCEDURE — G8427 DOCREV CUR MEDS BY ELIG CLIN: HCPCS | Performed by: NURSE PRACTITIONER

## 2018-10-04 PROCEDURE — G8417 CALC BMI ABV UP PARAM F/U: HCPCS | Performed by: NURSE PRACTITIONER

## 2018-10-04 PROCEDURE — 99214 OFFICE O/P EST MOD 30 MIN: CPT | Performed by: NURSE PRACTITIONER

## 2018-10-04 PROCEDURE — 3017F COLORECTAL CA SCREEN DOC REV: CPT | Performed by: NURSE PRACTITIONER

## 2018-10-04 PROCEDURE — G8484 FLU IMMUNIZE NO ADMIN: HCPCS | Performed by: NURSE PRACTITIONER

## 2018-10-04 PROCEDURE — 4004F PT TOBACCO SCREEN RCVD TLK: CPT | Performed by: NURSE PRACTITIONER

## 2018-10-04 RX ORDER — VARENICLINE TARTRATE 0.5 MG/1
TABLET, FILM COATED ORAL
Qty: 95 TABLET | Refills: 0 | Status: SHIPPED | OUTPATIENT
Start: 2018-10-04 | End: 2018-11-29

## 2018-10-04 RX ORDER — SERTRALINE HYDROCHLORIDE 25 MG/1
25 TABLET, FILM COATED ORAL DAILY
Qty: 30 TABLET | Refills: 3 | Status: SHIPPED | OUTPATIENT
Start: 2018-10-04 | End: 2018-11-01 | Stop reason: ALTCHOICE

## 2018-10-04 RX ORDER — ACYCLOVIR 800 MG/1
800 TABLET ORAL 2 TIMES DAILY
Qty: 10 TABLET | Refills: 0 | Status: SHIPPED | OUTPATIENT
Start: 2018-10-04 | End: 2018-10-09

## 2018-10-04 RX ORDER — MUPIROCIN CALCIUM 20 MG/G
CREAM TOPICAL
Qty: 15 G | Refills: 1 | Status: SHIPPED | OUTPATIENT
Start: 2018-10-04 | End: 2018-11-03

## 2018-10-04 RX ORDER — ACYCLOVIR 800 MG/1
800 TABLET ORAL 2 TIMES DAILY
Qty: 10 TABLET | Refills: 0 | Status: SHIPPED | OUTPATIENT
Start: 2018-10-04 | End: 2018-10-04 | Stop reason: SDUPTHER

## 2018-10-04 ASSESSMENT — PATIENT HEALTH QUESTIONNAIRE - PHQ9
SUM OF ALL RESPONSES TO PHQ9 QUESTIONS 1 & 2: 5
6. FEELING BAD ABOUT YOURSELF - OR THAT YOU ARE A FAILURE OR HAVE LET YOURSELF OR YOUR FAMILY DOWN: 3
7. TROUBLE CONCENTRATING ON THINGS, SUCH AS READING THE NEWSPAPER OR WATCHING TELEVISION: 3
10. IF YOU CHECKED OFF ANY PROBLEMS, HOW DIFFICULT HAVE THESE PROBLEMS MADE IT FOR YOU TO DO YOUR WORK, TAKE CARE OF THINGS AT HOME, OR GET ALONG WITH OTHER PEOPLE: 3
SUM OF ALL RESPONSES TO PHQ QUESTIONS 1-9: 22
3. TROUBLE FALLING OR STAYING ASLEEP: 3
8. MOVING OR SPEAKING SO SLOWLY THAT OTHER PEOPLE COULD HAVE NOTICED. OR THE OPPOSITE, BEING SO FIGETY OR RESTLESS THAT YOU HAVE BEEN MOVING AROUND A LOT MORE THAN USUAL: 1
4. FEELING TIRED OR HAVING LITTLE ENERGY: 3
2. FEELING DOWN, DEPRESSED OR HOPELESS: 2
9. THOUGHTS THAT YOU WOULD BE BETTER OFF DEAD, OR OF HURTING YOURSELF: 1
1. LITTLE INTEREST OR PLEASURE IN DOING THINGS: 3
SUM OF ALL RESPONSES TO PHQ QUESTIONS 1-9: 22
5. POOR APPETITE OR OVEREATING: 3

## 2018-10-04 ASSESSMENT — ENCOUNTER SYMPTOMS
SHORTNESS OF BREATH: 0
COUGH: 0
BACK PAIN: 0
ABDOMINAL PAIN: 0

## 2018-10-04 NOTE — PROGRESS NOTES
40.20 kg/m²     Assessment:       Diagnosis Orders   1. Severe episode of recurrent major depressive disorder, without psychotic features ([facility] [address]  sertraline (ZOLOFT) 25 MG tablet   2. Cold sore  acyclovir (ZOVIRAX) 800 MG tablet    DISCONTINUED: acyclovir (ZOVIRAX) 800 MG tablet   3. Fall, sequela  mupirocin (BACTROBAN) 2 % cream             Plan:      Return in about 1 month (around 11/4/2018) for depression. 1. Depression- Rx given for zoloft with instruction for use. Given information for local counselors. Follow up in one month for recheck. 2. Cold sores- Rx given for zovirax. Follow up as needed. 3. Fall- Notify office for any headaches/dizziness/vision changes. Otherwise continue motrin use. Rx given for bactroban to left knee. Follow up for any problems/concerns. Of the 25 minute duration appointment visit, Efraín SANTIZOBC spent at least 50% of the face-to-face time in counseling, explanation of diagnosis, planning of further management, and answering all questions. Orders Placed This Encounter   Medications    sertraline (ZOLOFT) 25 MG tablet     Sig: Take 1 tablet by mouth daily     Dispense:  30 tablet     Refill:  3    DISCONTD: acyclovir (ZOVIRAX) 800 MG tablet     Sig: Take 1 tablet by mouth 2 times daily for 5 days     Dispense:  10 tablet     Refill:  0    mupirocin (BACTROBAN) 2 % cream     Sig: Apply 3 times daily. Dispense:  15 g     Refill:  1    acyclovir (ZOVIRAX) 800 MG tablet     Sig: Take 1 tablet by mouth 2 times daily for 5 days     Dispense:  10 tablet     Refill:  0       Patient given educational materials - see patient instructions. Discussed use, benefit, and side effects of prescribed medications. All patient questions answered. Pt voiced understanding. Reviewed health maintenance. Instructed to continue current medications, diet and exercise. Patient agreed with treatment plan. Follow up as directed.      Electronically signed by Nohemi

## 2018-10-05 ENCOUNTER — HOSPITAL ENCOUNTER (OUTPATIENT)
Dept: CT IMAGING | Age: 53
Discharge: HOME | End: 2018-10-07
Payer: COMMERCIAL

## 2018-10-05 ENCOUNTER — TELEPHONE (OUTPATIENT)
Dept: PRIMARY CARE CLINIC | Age: 53
End: 2018-10-05

## 2018-10-05 DIAGNOSIS — R51.9 ACUTE NONINTRACTABLE HEADACHE, UNSPECIFIED HEADACHE TYPE: ICD-10-CM
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
## 2018-10-05 PROCEDURE — 70450 CT HEAD/BRAIN W/O DYE: CPT

## 2018-10-23 ENCOUNTER — HOSPITAL ENCOUNTER (OUTPATIENT)
Age: 53
Discharge: HOME | End: 2018-10-23
Payer: COMMERCIAL

## 2018-10-23 ENCOUNTER — HOSPITAL ENCOUNTER (OUTPATIENT)
Age: 53
Setting detail: SPECIMEN
Discharge: HOME | End: 2018-10-23
Payer: COMMERCIAL

## 2018-10-23 ENCOUNTER — HOSPITAL ENCOUNTER (OUTPATIENT)
Dept: CT IMAGING | Age: 53
Discharge: HOME | End: 2018-10-25
Payer: COMMERCIAL

## 2018-10-23 ENCOUNTER — OFFICE VISIT (OUTPATIENT)
Dept: PRIMARY CARE CLINIC | Age: 53
End: 2018-10-23
Payer: COMMERCIAL

## 2018-10-23 ENCOUNTER — TELEPHONE (OUTPATIENT)
Dept: PRIMARY CARE CLINIC | Age: 53
End: 2018-10-23

## 2018-10-23 VITALS
BODY MASS INDEX: 40.06 KG/M2 | HEART RATE: 78 BPM | TEMPERATURE: 98.6 F | WEIGHT: 219 LBS | SYSTOLIC BLOOD PRESSURE: 148 MMHG | RESPIRATION RATE: 16 BRPM | DIASTOLIC BLOOD PRESSURE: 92 MMHG

## 2018-10-23 DIAGNOSIS — K62.5 RECTAL BLEEDING: ICD-10-CM

## 2018-10-23 DIAGNOSIS — R31.9 HEMATURIA, UNSPECIFIED TYPE: ICD-10-CM

## 2018-10-23 DIAGNOSIS — R11.0 NAUSEA: ICD-10-CM

## 2018-10-23 DIAGNOSIS — R10.9 ABDOMINAL CRAMPING: ICD-10-CM

## 2018-10-23 DIAGNOSIS — D35.00 ADRENAL ADENOMA, UNSPECIFIED LATERALITY: ICD-10-CM

## 2018-10-23 DIAGNOSIS — R19.7 DIARRHEA, UNSPECIFIED TYPE: Primary | ICD-10-CM

## 2018-10-23 DIAGNOSIS — R19.7 DIARRHEA, UNSPECIFIED TYPE: ICD-10-CM

## 2018-10-23 DIAGNOSIS — K57.91 DIVERTICULOSIS OF INTESTINE WITH BLEEDING, UNSPECIFIED INTESTINAL TRACT LOCATION: ICD-10-CM

## 2018-10-23 DIAGNOSIS — E87.6 HYPOKALEMIA: Primary | ICD-10-CM

## 2018-10-23 LAB
ALBUMIN SERPL-MCNC: 4.2 G/DL (ref 3.5–5.2)
ALBUMIN/GLOBULIN RATIO: 1.4 (ref 1–2.5)
ALP BLD-CCNC: 66 U/L (ref 35–104)
ALT SERPL-CCNC: 21 U/L (ref 5–33)
ANION GAP SERPL CALCULATED.3IONS-SCNC: 15 MMOL/L (ref 9–17)
AST SERPL-CCNC: 22 U/L
BILIRUB SERPL-MCNC: 0.3 MG/DL (ref 0.3–1.2)
BILIRUBIN, POC: NORMAL
BLOOD URINE, POC: NORMAL
BUN BLDV-MCNC: 17 MG/DL (ref 6–20)
BUN/CREAT BLD: ABNORMAL (ref 9–20)
CALCIUM SERPL-MCNC: 9.2 MG/DL (ref 8.6–10.4)
CHLORIDE BLD-SCNC: 105 MMOL/L (ref 98–107)
CLARITY, POC: CLEAR
CO2: 21 MMOL/L (ref 20–31)
COLOR, POC: YELLOW
CREAT SERPL-MCNC: 0.7 MG/DL (ref 0.5–0.9)
GFR AFRICAN AMERICAN: >60 ML/MIN
GFR NON-AFRICAN AMERICAN: >60 ML/MIN
GFR SERPL CREATININE-BSD FRML MDRD: ABNORMAL ML/MIN/{1.73_M2}
GFR SERPL CREATININE-BSD FRML MDRD: ABNORMAL ML/MIN/{1.73_M2}
GLUCOSE BLD-MCNC: 86 MG/DL (ref 70–99)
GLUCOSE URINE, POC: NORMAL
HCT VFR BLD CALC: 42 % (ref 36.3–47.1)
HEMOGLOBIN: 13.4 G/DL (ref 11.9–15.1)
KETONES, POC: NORMAL
LEUKOCYTE EST, POC: NORMAL
MCH RBC QN AUTO: 29.3 PG (ref 25.2–33.5)
MCHC RBC AUTO-ENTMCNC: 31.9 G/DL (ref 28.4–34.8)
MCV RBC AUTO: 91.9 FL (ref 82.6–102.9)
NITRITE, POC: NORMAL
NRBC AUTOMATED: 0 PER 100 WBC
PDW BLD-RTO: 13.2 % (ref 11.8–14.4)
PH, POC: 5.5
PLATELET # BLD: 182 K/UL (ref 138–453)
PMV BLD AUTO: 12.1 FL (ref 8.1–13.5)
POTASSIUM SERPL-SCNC: 3.6 MMOL/L (ref 3.7–5.3)
PROTEIN, POC: NORMAL
RBC # BLD: 4.57 M/UL (ref 3.95–5.11)
SODIUM BLD-SCNC: 141 MMOL/L (ref 135–144)
SPECIFIC GRAVITY, POC: 1.02
TOTAL PROTEIN: 7.2 G/DL (ref 6.4–8.3)
UROBILINOGEN, POC: 0.2
WBC # BLD: 5.7 K/UL (ref 3.5–11.3)

## 2018-10-23 PROCEDURE — 3017F COLORECTAL CA SCREEN DOC REV: CPT | Performed by: NURSE PRACTITIONER

## 2018-10-23 PROCEDURE — 99214 OFFICE O/P EST MOD 30 MIN: CPT | Performed by: NURSE PRACTITIONER

## 2018-10-23 PROCEDURE — G8484 FLU IMMUNIZE NO ADMIN: HCPCS | Performed by: NURSE PRACTITIONER

## 2018-10-23 PROCEDURE — 6360000004 HC RX CONTRAST MEDICATION: Performed by: NURSE PRACTITIONER

## 2018-10-23 PROCEDURE — G8427 DOCREV CUR MEDS BY ELIG CLIN: HCPCS | Performed by: NURSE PRACTITIONER

## 2018-10-23 PROCEDURE — 36415 COLL VENOUS BLD VENIPUNCTURE: CPT

## 2018-10-23 PROCEDURE — 82150 ASSAY OF AMYLASE: CPT

## 2018-10-23 PROCEDURE — 83690 ASSAY OF LIPASE: CPT

## 2018-10-23 PROCEDURE — 85027 COMPLETE CBC AUTOMATED: CPT

## 2018-10-23 PROCEDURE — 81002 URINALYSIS NONAUTO W/O SCOPE: CPT | Performed by: NURSE PRACTITIONER

## 2018-10-23 PROCEDURE — 2580000003 HC RX 258: Performed by: NURSE PRACTITIONER

## 2018-10-23 PROCEDURE — 4004F PT TOBACCO SCREEN RCVD TLK: CPT | Performed by: NURSE PRACTITIONER

## 2018-10-23 PROCEDURE — 80053 COMPREHEN METABOLIC PANEL: CPT

## 2018-10-23 PROCEDURE — G8417 CALC BMI ABV UP PARAM F/U: HCPCS | Performed by: NURSE PRACTITIONER

## 2018-10-23 PROCEDURE — 74177 CT ABD & PELVIS W/CONTRAST: CPT

## 2018-10-23 RX ORDER — CIPROFLOXACIN 500 MG/1
500 TABLET, FILM COATED ORAL 2 TIMES DAILY
Qty: 14 TABLET | Refills: 0 | Status: SHIPPED | OUTPATIENT
Start: 2018-10-23 | End: 2018-10-30 | Stop reason: ALTCHOICE

## 2018-10-23 RX ORDER — DICYCLOMINE HYDROCHLORIDE 10 MG/1
10 CAPSULE ORAL
Qty: 120 CAPSULE | Refills: 0 | Status: SHIPPED | OUTPATIENT
Start: 2018-10-23 | End: 2018-11-01 | Stop reason: ALTCHOICE

## 2018-10-23 RX ORDER — 0.9 % SODIUM CHLORIDE 0.9 %
60 INTRAVENOUS SOLUTION INTRAVENOUS ONCE
Status: COMPLETED | OUTPATIENT
Start: 2018-10-23 | End: 2018-10-23

## 2018-10-23 RX ORDER — SODIUM CHLORIDE 0.9 % (FLUSH) 0.9 %
10 SYRINGE (ML) INJECTION PRN
Status: DISCONTINUED | OUTPATIENT
Start: 2018-10-23 | End: 2018-10-26 | Stop reason: HOSPADM

## 2018-10-23 RX ORDER — POTASSIUM CHLORIDE 750 MG/1
10 TABLET, EXTENDED RELEASE ORAL DAILY
Qty: 7 TABLET | Refills: 0 | Status: SHIPPED | OUTPATIENT
Start: 2018-10-23 | End: 2018-11-01 | Stop reason: ALTCHOICE

## 2018-10-23 RX ORDER — ONDANSETRON 4 MG/1
4 TABLET, ORALLY DISINTEGRATING ORAL EVERY 8 HOURS PRN
Qty: 20 TABLET | Refills: 0 | Status: SHIPPED | OUTPATIENT
Start: 2018-10-23 | End: 2018-11-01 | Stop reason: ALTCHOICE

## 2018-10-23 RX ADMIN — IOPAMIDOL 75 ML: 755 INJECTION, SOLUTION INTRAVENOUS at 16:01

## 2018-10-23 RX ADMIN — IOHEXOL 50 ML: 240 INJECTION, SOLUTION INTRATHECAL; INTRAVASCULAR; INTRAVENOUS; ORAL at 16:01

## 2018-10-23 RX ADMIN — Medication 10 ML: at 16:02

## 2018-10-23 RX ADMIN — SODIUM CHLORIDE 60 ML: 9 INJECTION, SOLUTION INTRAVENOUS at 16:02

## 2018-10-23 ASSESSMENT — ENCOUNTER SYMPTOMS
RECTAL PAIN: 0
DIARRHEA: 1
CHEST TIGHTNESS: 0
BELCHING: 0
CRAMPS: 1
BLOOD IN STOOL: 1
ABDOMINAL DISTENTION: 0
HEMATOCHEZIA: 0
VOMITING: 0
CONSTIPATION: 0
COUGH: 0
NAUSEA: 1
FLATUS: 0
SHORTNESS OF BREATH: 0
BLOATING: 1
ABDOMINAL PAIN: 1

## 2018-10-23 NOTE — PATIENT INSTRUCTIONS
4 times each day. Your doctor may occasionally change your dose to make sure you get the best results. Take this medicine with a full glass of water. Measure liquid medicine with a special dose-measuring spoon or cup, not a regular table spoon. If you do not have a dose-measuring device, ask your pharmacist for one. Talk with your doctor if your symptoms do not improve after 2 weeks of treatment. Store at room temperature away from moisture and heat. What happens if I miss a dose? Take the missed dose as soon as you remember. Skip the missed dose if it is almost time for your next scheduled dose. Do not take extra medicine to make up the missed dose. What happens if I overdose? Seek emergency medical attention or call the Poison Help line at 1-721.914.4113. Overdose symptoms may include nausea, vomiting, dilated pupils, weakness or loss of movement in any part of your body, trouble swallowing, fainting, or seizure (convulsions). What should I avoid while taking dicyclomine? This medication may cause blurred vision and may impair your thinking or reactions. Be careful if you drive or do anything that requires you to be alert and able to see clearly. Avoid becoming overheated or dehydrated during exercise and in hot weather. Dicyclomine can cause decreased sweating, which can lead to heat stroke in a hot environment. Drinking alcohol can increase certain side effects of dicyclomine. Avoid using antacids without your doctor's advice. Use only the type of antacid your doctor recommends. Some antacids can make it harder for your body to absorb dicyclomine. What are the possible side effects of dicyclomine? Get emergency medical help if you have any of these signs of an allergic reaction: hives; difficult breathing; swelling of your face, lips, tongue, or throat.   Stop using dicyclomine and call your doctor at once if you have a serious side effect such as:  · severe constipation, bloating, or stomach

## 2018-10-23 NOTE — PROGRESS NOTES
pain pressure or shortness of breath increasing pain seek emergent care. She is going right over now to have her CT of her abdomen completed. In addition I'm going to refer her to gastroenterology she is going to need further evaluation she has never had a colonoscopy either. Asked her to return in 1 week to follow up with her PCP in regards to the symptoms and testing and in addition she has stopped her Zoloft will discuss restarting at that time. Return in about 1 week (around 10/30/2018) for abdominal cramping diarrhea with terrance. Orders Placed This Encounter   Procedures    Stool Culture     Standing Status:   Future     Standing Expiration Date:   10/23/2019    C Diff Toxin by RT PCR     Standing Status:   Future     Standing Expiration Date:   10/23/2019    CT ABDOMEN PELVIS W IV CONTRAST     Standing Status:   Future     Standing Expiration Date:   10/23/2019     Order Specific Question:   Reason for exam:     Answer:   abdominal cramping, nausea, diarrhea, rectal bleeding, hematuria. Hx of one kidney last renal function normal.    CBC     Standing Status:   Future     Standing Expiration Date:   10/23/2019    Comprehensive Metabolic Panel     Standing Status:   Future     Standing Expiration Date:   10/23/2019    Amylase     Standing Status:   Future     Standing Expiration Date:   10/23/2019    Lipase     Standing Status:   Future     Standing Expiration Date:   10/23/2019    Blood Occult Stool #1     Standing Status:   Future     Standing Expiration Date:   10/23/2019    Urinalysis Reflex to Culture     Standing Status:   Future     Standing Expiration Date:   10/23/2019     Order Specific Question:   SPECIFY(EX-CATH,MIDSTREAM,CYSTO,ETC)?      Answer:   midstream   Chela Trimble MD, [facility] [place]     Referral Priority:   Routine     Referral Type:   Consult for Advice and Opinion     Referral Reason:   Specialty Services Required     Referred to Provider:

## 2018-10-24 DIAGNOSIS — R31.9 HEMATURIA, UNSPECIFIED TYPE: ICD-10-CM

## 2018-10-24 LAB
AMYLASE: 65 U/L (ref 28–100)
BILIRUBIN URINE: NEGATIVE
COLOR: YELLOW
COMMENT UA: ABNORMAL
GLUCOSE URINE: NEGATIVE
KETONES, URINE: ABNORMAL
LEUKOCYTE ESTERASE, URINE: NEGATIVE
LIPASE: 36 U/L (ref 13–60)
NITRITE, URINE: NEGATIVE
PH UA: 5 (ref 5–8)
PROTEIN UA: NEGATIVE
SPECIFIC GRAVITY UA: 1.02 (ref 1–1.03)
TURBIDITY: CLEAR
URINE HGB: NEGATIVE
UROBILINOGEN, URINE: NORMAL

## 2018-10-25 ENCOUNTER — HOSPITAL ENCOUNTER (OUTPATIENT)
Age: 53
Setting detail: SPECIMEN
Discharge: HOME | End: 2018-10-25
Payer: COMMERCIAL

## 2018-10-25 ENCOUNTER — TELEPHONE (OUTPATIENT)
Dept: PRIMARY CARE CLINIC | Age: 53
End: 2018-10-25

## 2018-10-25 DIAGNOSIS — R10.9 ABDOMINAL CRAMPING: ICD-10-CM

## 2018-10-25 DIAGNOSIS — R11.0 NAUSEA: ICD-10-CM

## 2018-10-25 DIAGNOSIS — K62.5 RECTAL BLEEDING: ICD-10-CM

## 2018-10-25 DIAGNOSIS — R19.7 DIARRHEA, UNSPECIFIED TYPE: ICD-10-CM

## 2018-10-25 LAB
CULTURE: NORMAL
DATE, STOOL #1: NORMAL
DATE, STOOL #2: NORMAL
DATE, STOOL #3: NORMAL
HEMOCCULT SP1 STL QL: NEGATIVE
HEMOCCULT SP2 STL QL: NORMAL
HEMOCCULT SP3 STL QL: NORMAL
Lab: NORMAL
SPECIMEN DESCRIPTION: NORMAL
STATUS: NORMAL
TIME, STOOL #1: 940
TIME, STOOL #2: NORMAL
TIME, STOOL #3: NORMAL

## 2018-10-25 PROCEDURE — 87046 STOOL CULTR AEROBIC BACT EA: CPT

## 2018-10-25 PROCEDURE — 87505 NFCT AGENT DETECTION GI: CPT

## 2018-10-25 PROCEDURE — 87427 SHIGA-LIKE TOXIN AG IA: CPT

## 2018-10-25 PROCEDURE — 82272 OCCULT BLD FECES 1-3 TESTS: CPT

## 2018-10-25 PROCEDURE — 87493 C DIFF AMPLIFIED PROBE: CPT

## 2018-10-25 PROCEDURE — 87045 FECES CULTURE AEROBIC BACT: CPT

## 2018-10-26 LAB
C DIFFICILE TOXINS, PCR: NORMAL
CAMPYLOBACTER PCR: NORMAL
SALMONELLA PCR: NORMAL
SHIGATOXIN GENE PCR: NORMAL
SHIGELLA SP PCR: NORMAL
SPECIMEN DESCRIPTION: NORMAL
SPECIMEN: NORMAL

## 2018-10-30 ENCOUNTER — OFFICE VISIT (OUTPATIENT)
Dept: PRIMARY CARE CLINIC | Age: 53
End: 2018-10-30
Payer: COMMERCIAL

## 2018-10-30 VITALS
OXYGEN SATURATION: 99 % | RESPIRATION RATE: 20 BRPM | DIASTOLIC BLOOD PRESSURE: 96 MMHG | WEIGHT: 219 LBS | HEIGHT: 62 IN | HEART RATE: 87 BPM | SYSTOLIC BLOOD PRESSURE: 158 MMHG | BODY MASS INDEX: 40.3 KG/M2 | TEMPERATURE: 98 F

## 2018-10-30 DIAGNOSIS — I10 HYPERTENSION, UNSPECIFIED TYPE: ICD-10-CM

## 2018-10-30 DIAGNOSIS — R11.2 NAUSEA AND VOMITING, INTRACTABILITY OF VOMITING NOT SPECIFIED, UNSPECIFIED VOMITING TYPE: ICD-10-CM

## 2018-10-30 DIAGNOSIS — K21.9 GASTROESOPHAGEAL REFLUX DISEASE, ESOPHAGITIS PRESENCE NOT SPECIFIED: ICD-10-CM

## 2018-10-30 DIAGNOSIS — O26.899 ABDOMINAL CRAMPING AFFECTING PREGNANCY: Primary | ICD-10-CM

## 2018-10-30 DIAGNOSIS — R10.9 ABDOMINAL CRAMPING AFFECTING PREGNANCY: Primary | ICD-10-CM

## 2018-10-30 DIAGNOSIS — K57.90 DIVERTICULOSIS OF INTESTINE WITHOUT BLEEDING, UNSPECIFIED INTESTINAL TRACT LOCATION: ICD-10-CM

## 2018-10-30 DIAGNOSIS — R10.10 PAIN OF UPPER ABDOMEN: ICD-10-CM

## 2018-10-30 PROCEDURE — 4004F PT TOBACCO SCREEN RCVD TLK: CPT | Performed by: NURSE PRACTITIONER

## 2018-10-30 PROCEDURE — G8417 CALC BMI ABV UP PARAM F/U: HCPCS | Performed by: NURSE PRACTITIONER

## 2018-10-30 PROCEDURE — 99214 OFFICE O/P EST MOD 30 MIN: CPT | Performed by: NURSE PRACTITIONER

## 2018-10-30 PROCEDURE — G8484 FLU IMMUNIZE NO ADMIN: HCPCS | Performed by: NURSE PRACTITIONER

## 2018-10-30 PROCEDURE — 3017F COLORECTAL CA SCREEN DOC REV: CPT | Performed by: NURSE PRACTITIONER

## 2018-10-30 PROCEDURE — G8427 DOCREV CUR MEDS BY ELIG CLIN: HCPCS | Performed by: NURSE PRACTITIONER

## 2018-10-30 RX ORDER — OMEPRAZOLE 40 MG/1
40 CAPSULE, DELAYED RELEASE ORAL DAILY
Qty: 14 CAPSULE | Refills: 0 | Status: SHIPPED | OUTPATIENT
Start: 2018-10-30 | End: 2018-11-29

## 2018-10-30 RX ORDER — PROMETHAZINE HYDROCHLORIDE 25 MG/1
25 TABLET ORAL EVERY 6 HOURS PRN
Qty: 20 TABLET | Refills: 0 | Status: SHIPPED | OUTPATIENT
Start: 2018-10-30 | End: 2018-11-06

## 2018-10-30 NOTE — PROGRESS NOTES
Visit Information    Have you changed or started any medications since your last visit including any over-the-counter medicines, vitamins, or herbal medicines? no    Have you stopped taking any of your medications? Is so, why? -  yes -   Are you having any side effects from any of your medications? - no    Have you seen any other physician or provider since your last visit?  no   Have you had any other diagnostic tests since your last visit? yes - labs and imaging    Have you been seen in the emergency room and/or had an admission in a hospital since we last saw you?  no   Have you had your routine dental cleaning in the past 6 months?  no     Do you have an active MyChart account? If no, what is the barrier?   Yes    Patient Care Team:  DK Becerril CNP as PCP - General (Nurse Practitioner)  DK Becerril CNP as PCP - S Attributed Provider    Medical History Review  Past Medical, Family, and Social History reviewed and does contribute to the patient presenting condition    Health Maintenance   Topic Date Due    HIV screen  03/31/1982    DTaP/Tdap/Td vaccine (1 - Tdap) 03/31/1986    Pneumococcal med risk (1 of 1 - PPSV23) 03/31/1986    Cervical cancer screen  03/31/1988    Shingles Vaccine (1 of 2 - 2 Dose Series) 03/31/2017    Flu vaccine (1) 09/01/2018    TSH testing  05/31/2019    Potassium monitoring  10/23/2019    Creatinine monitoring  10/23/2019    Colon Cancer Screen FIT/FOBT  10/25/2019    Breast cancer screen  10/26/2019    Lipid screen  05/31/2023
directed. Hypertension-her blood pressure is elevated today although she is in some discomfort and in addition she is not taking her medication past 3 days encouraged to take medication as directed. Patient verbalized understanding. Follow up 3 days a PCP review testing and for chronic issues. Jean Carlos Nguyen received counseling on the following healthy behaviors: nutrition, exercise and medication adherence    Patient given educational materials on phenergan, prilosec,     Was a self-tracking handout given in paper form or via [employer]? No  If yes, see orders or list here. Discussed use, benefit, and side effects of prescribed medications. Barriers to medication compliance addressed. All patient questions answered. Pt voiced understanding. This note is created with the assistance of a speech-recognition program.  While intending to generate a document that actually reflects the content of the visit, no guarantees can be provided that every mistake has been identified and corrected by editing. Of the 25 minute duration appointment visit, Bigg Machado CNP spent at least 50% of the face-to-face time in counseling, explanation of diagnosis, planning of further management, and answering all questions.

## 2018-10-30 NOTE — PATIENT INSTRUCTIONS
Patient Education   Patient Education          omeprazole  Pronunciation:  oh MEP ra zol  Brand:  FIRST Omeprazole, PriLOSEC, PriLOSEC OTC  What is the most important information I should know about omeprazole? Follow all directions on your medicine label and package. Tell each of your healthcare providers about all your medical conditions, allergies, and all medicines you use. What is omeprazole? Omeprazole is a proton pump inhibitor that decreases the amount of acid produced in the stomach. Omeprazole is used to treat symptoms of gastroesophageal reflux disease (GERD) and other conditions caused by excess stomach acid. Omeprazole is also used to promote healing of erosive esophagitis (damage to your esophagus caused by stomach acid). Omeprazole may also be given together with antibiotics to treat gastric ulcer caused by infection with helicobacter pylori (H. pylori). Omeprazole is not for immediate relief of heartburn symptoms. Omeprazole may also be used for purposes not listed in this medication guide. What should I discuss with my healthcare provider before taking omeprazole? Heartburn is often confused with the first symptoms of a heart attack. Seek emergency medical attention if you have chest pain or heavy feeling, pain spreading to the arm or shoulder, nausea, sweating, and a general ill feeling. You should not use this medicine if you are allergic to omeprazole or to any benzimidazole medicine such as albendazole or mebendazole. Ask a doctor or pharmacist if it is safe for you to use omeprazole if you have other medical conditions, especially:  · liver disease;  · low levels of magnesium in your blood; or  · osteoporosis or low bone mineral density (osteopenia).   Do not use over-the-counter omeprazole (Prilosec OTC) without the advice of a doctor if you have:  · trouble or pain with swallowing;  · bloody or black stools, vomit that looks like blood or coffee grounds;  · heartburn that has lasted for over 3 months;  · frequent chest pain, heartburn with wheezing;  · unexplained weight loss; or  · nausea or vomiting, stomach pain. Taking a proton pump inhibitor such as omeprazole may increase your risk of bone fracture in the hip, wrist, or spine. This effect has occurred mostly in people who have taken the medication long term or at high doses, and in those who are age 48 and older. It is not clear whether omeprazole is the actual cause of an increased risk of fracture. Some conditions are treated with a combination of omeprazole and antibiotics. Use all medications as directed by your doctor. Read the medication guide or patient instructions provided with each medication. Do not change your doses or medication schedule without your doctor's advice. It is not known whether this medicine will harm an unborn baby. Tell your doctor if you are pregnant or plan to become pregnant. Omeprazole can pass into breast milk and may harm a nursing baby. Do not use this medicine without a doctor's advice if you are breast-feeding. Do not give omeprazole to a child younger than 3year old without the advice of a doctor. How should I take omeprazole? Omeprazole is usually taken before eating (at least 1 hour before a meal). Follow all directions on your prescription label. Do not take this medicine in larger or smaller amounts or for longer than recommended. Prilosec OTC (over-the-counter) should be taken only once every 24 hours for 14 days. Take the medicine in the morning before you eat breakfast. It may take up to 4 days for full effect. Do not take more than one tablet every 24 hours. Allow at least 4 months to pass before you start another 14-day treatment with Prilosec OTC. Call your doctor if you have additional symptoms and need treatment before the 4 months has passed. Do not crush, chew, or break an enteric coated pill, or a Prilosec OTC tablet. Swallow the pill whole.   You may open the delayed-release mesoridazine, perphenazine, prochlorperazine, thioridazine, or trifluperazine. To make sure promethazine is safe for you, tell your doctor if you have:  · asthma, chronic obstructive pulmonary disease (COPD), sleep apnea, or other breathing disorder;  · a sulfite allergy;  · a history of seizures;  · a weak immune system (bone marrow depression);  · glaucoma;  · enlarged prostate or problems with urination;  · stomach ulcer or obstruction;  · heart disease or high blood pressure;  · liver disease;  · adrenal gland tumor (pheochromocytoma);  · low levels of calcium in your blood (hypocalcemia); or  · if you have ever had a serious side effect while using promethazine or any other phenothiazine. It is not known whether promethazine will harm an unborn baby. Tell your doctor if you are pregnant or plan to become pregnant while using this medicine. It is not known whether promethazine passes into breast milk or if it could harm a nursing baby. You should not breast-feed while using this medicine. How should I take promethazine? Follow all directions on your prescription label. Your doctor may occasionally change your dose to make sure you get the best results. Do not take this medicine in larger or smaller amounts or for longer than recommended. Promethazine is often taken at bedtime or before meals. For motion sickness, promethazine is usually started within 1 hour before traveling. When used for surgery, promethazine is usually taken the night before the surgery. How often you take this medicine and the timing of your dose will depend on the condition being treated. Measure liquid medicine with the dosing syringe provided, or with a special dose-measuring spoon or medicine cup. If you do not have a dose-measuring device, ask your pharmacist for one. If a child is using this medicine, tell your doctor if the child has any changes in weight.  Promethazine doses are based on weight in children, and any changes may affect your child's dose. Call your doctor if your symptoms do not improve, or if they get worse while using promethazine. This medicine can cause unusual results with certain medical tests. Tell any doctor who treats you that you are using promethazine. Store at room temperature away from moisture, heat, and light. What happens if I miss a dose? Take the missed dose as soon as you remember. Skip the missed dose if it is almost time for your next scheduled dose. Do not take extra medicine to make up the missed dose. What happens if I overdose? Seek emergency medical attention or call the Poison Help line at 1-752.314.1131. Overdose symptoms may include overactive reflexes, loss of coordination, severe drowsiness or weakness, fainting, dilated pupils, weak or shallow breathing, or seizure (convulsions). What should I avoid while taking promethazine? This medicine may impair your thinking or reactions. Be careful if you drive or do anything that requires you to be alert. Avoid getting up too fast from a sitting or lying position, or you may feel dizzy. Get up slowly and steady yourself to prevent a fall. Drinking alcohol can increase certain side effects of promethazine. Avoid exposure to sunlight or tanning beds. Promethazine can make you sunburn more easily. Wear protective clothing and use sunscreen (SPF 30 or higher) when you are outdoors. What are the possible side effects of promethazine? Get emergency medical help if you have signs of an allergic reaction: hives; difficult breathing; swelling of your face, lips, tongue, or throat.   Stop using promethazine and call your doctor at once if you have:  · severe drowsiness, weak or shallow breathing;  · a light-headed feeling, like you might pass out;  · confusion, agitation, hallucinations, nightmares;  · seizure (convulsions);  · fast or slow heartbeats;  · jaundice (yellowing of the skin or eyes);  · uncontrolled muscle movements in your face may be time sensitive. [employer] information has been compiled for use by healthcare practitioners and consumers in the [place] and therefore [employer] does not warrant that uses outside of the [place] are appropriate, unless specifically indicated otherwise. [facility]'s drug information does not endorse drugs, diagnose patients or recommend therapy. [facility] drug information is an informational resource designed to assist licensed healthcare practitioners in caring for their patients and/or to serve consumers viewing this service as a supplement to, and not a substitute for, the expertise, skill, knowledge and judgment of healthcare practitioners. The absence of a warning for a given drug or drug combination in no way should be construed to indicate that the drug or drug combination is safe, effective or appropriate for any given patient. [facility] does not assume any responsibility for any aspect of healthcare administered with the aid of information [facility] provides. The information contained herein is not intended to cover all possible uses, directions, precautions, warnings, drug interactions, allergic reactions, or adverse effects. If you have questions about the drugs you are taking, check with your doctor, nurse or pharmacist.  Copyright 2371-5897 [address]. Version: 6.02. Revision date: 10/15/2015. Care instructions adapted under license by [facility] ([facility]). If you have questions about a medical condition or this instruction, always ask your healthcare professional. [place] [address] any warranty or liability for your use of this information.

## 2018-11-01 ENCOUNTER — TELEPHONE (OUTPATIENT)
Dept: PRIMARY CARE CLINIC | Age: 53
End: 2018-11-01

## 2018-11-01 ENCOUNTER — OFFICE VISIT (OUTPATIENT)
Dept: PRIMARY CARE CLINIC | Age: 53
End: 2018-11-01
Payer: COMMERCIAL

## 2018-11-01 VITALS
DIASTOLIC BLOOD PRESSURE: 84 MMHG | HEIGHT: 62 IN | WEIGHT: 219 LBS | RESPIRATION RATE: 16 BRPM | BODY MASS INDEX: 40.3 KG/M2 | HEART RATE: 68 BPM | SYSTOLIC BLOOD PRESSURE: 132 MMHG | OXYGEN SATURATION: 96 %

## 2018-11-01 DIAGNOSIS — D35.00 ADRENAL ADENOMA, UNSPECIFIED LATERALITY: ICD-10-CM

## 2018-11-01 DIAGNOSIS — I10 ESSENTIAL HYPERTENSION: Primary | ICD-10-CM

## 2018-11-01 DIAGNOSIS — Z12.31 ENCOUNTER FOR SCREENING MAMMOGRAM FOR BREAST CANCER: Primary | ICD-10-CM

## 2018-11-01 PROCEDURE — 99214 OFFICE O/P EST MOD 30 MIN: CPT | Performed by: NURSE PRACTITIONER

## 2018-11-01 PROCEDURE — G8427 DOCREV CUR MEDS BY ELIG CLIN: HCPCS | Performed by: NURSE PRACTITIONER

## 2018-11-01 PROCEDURE — G8417 CALC BMI ABV UP PARAM F/U: HCPCS | Performed by: NURSE PRACTITIONER

## 2018-11-01 PROCEDURE — 3017F COLORECTAL CA SCREEN DOC REV: CPT | Performed by: NURSE PRACTITIONER

## 2018-11-01 PROCEDURE — G8484 FLU IMMUNIZE NO ADMIN: HCPCS | Performed by: NURSE PRACTITIONER

## 2018-11-01 PROCEDURE — 4004F PT TOBACCO SCREEN RCVD TLK: CPT | Performed by: NURSE PRACTITIONER

## 2018-11-01 RX ORDER — METOPROLOL TARTRATE 50 MG/1
50 TABLET, FILM COATED ORAL 2 TIMES DAILY
Qty: 60 TABLET | Refills: 3 | Status: SHIPPED | OUTPATIENT
Start: 2018-11-01 | End: 2018-11-29

## 2018-11-01 RX ORDER — SIMVASTATIN 40 MG
40 TABLET ORAL NIGHTLY
Qty: 90 TABLET | Refills: 3 | Status: SHIPPED | OUTPATIENT
Start: 2018-11-01 | End: 2018-11-29

## 2018-11-01 ASSESSMENT — ENCOUNTER SYMPTOMS
FLATUS: 0
VOMITING: 1
CONSTIPATION: 0
COUGH: 0
ABDOMINAL PAIN: 1
BACK PAIN: 0
BELCHING: 0
HEMATOCHEZIA: 1
DIARRHEA: 1
NAUSEA: 1
SHORTNESS OF BREATH: 0

## 2018-11-01 NOTE — PROGRESS NOTES
Physical Exam   Constitutional: She is oriented to person, place, and time. She appears well-developed and well-nourished. HENT:   Head: Normocephalic and atraumatic. Eyes: Pupils are equal, round, and reactive to light. Neck: Normal range of motion. Neck supple. Cardiovascular: Normal rate, regular rhythm and normal heart sounds. Pulmonary/Chest: Effort normal and breath sounds normal.   Abdominal: Soft. Bowel sounds are normal. There is no tenderness. Musculoskeletal: Normal range of motion. Neurological: She is alert and oriented to person, place, and time. Skin: Skin is warm and dry. Psychiatric: She has a normal mood and affect. Her behavior is normal. Judgment and thought content normal.   Nursing note and vitals reviewed. /84   Pulse 68   Resp 16   Ht 5' 2\" (1.575 m)   Wt 219 lb (99.3 kg)   SpO2 96%   BMI 40.06 kg/m²     Assessment:       Diagnosis Orders   1. Essential hypertension     2. Adrenal adenoma, unspecified laterality  Endocrine & Diabetes [address] Brendon Lorenzo MD             Plan:      Return in about 1 month (around 12/1/2018). 1. HTN- Stable. Meds renewed. Follow up in one month for recheck. 2. Adrenal adenoma- Rx given for referral to endocrine. Follow up as needed. Of the 25 minute duration appointment visit, Stefanie DIAZ spent at least 50% of the face-to-face time in counseling, explanation of diagnosis, planning of further management, and answering all questions.     Orders Placed This Encounter   Procedures   [address] [place] Brendon Lorenzo MD     Referral Priority:   Routine     Referral Type:   Eval and Treat     Referral Reason:   Specialty Services Required     Referred to Provider:   Brady Cortes MD     Requested Specialty:   Endocrinology     Number of Visits Requested:   1     Orders Placed This Encounter   Medications    simvastatin (ZOCOR) 40 MG tablet     Sig: Take 1 tablet by

## 2018-11-02 ENCOUNTER — APPOINTMENT (OUTPATIENT)
Dept: ULTRASOUND IMAGING | Age: 53
End: 2018-11-02
Payer: COMMERCIAL

## 2018-11-02 ENCOUNTER — HOSPITAL ENCOUNTER (EMERGENCY)
Age: 53
Discharge: HOME | End: 2018-11-02
Attending: EMERGENCY MEDICINE
Payer: COMMERCIAL

## 2018-11-02 VITALS
DIASTOLIC BLOOD PRESSURE: 102 MMHG | TEMPERATURE: 98.1 F | OXYGEN SATURATION: 95 % | WEIGHT: 219 LBS | SYSTOLIC BLOOD PRESSURE: 160 MMHG | BODY MASS INDEX: 40.3 KG/M2 | HEART RATE: 72 BPM | HEIGHT: 62 IN | RESPIRATION RATE: 16 BRPM

## 2018-11-02 DIAGNOSIS — R10.10 PAIN OF UPPER ABDOMEN: ICD-10-CM

## 2018-11-02 DIAGNOSIS — R11.0 NAUSEA: Primary | ICD-10-CM

## 2018-11-02 DIAGNOSIS — R19.7 DIARRHEA, UNSPECIFIED TYPE: ICD-10-CM

## 2018-11-02 LAB
ABSOLUTE EOS #: 0.4 K/UL (ref 0–0.4)
ABSOLUTE IMMATURE GRANULOCYTE: ABNORMAL K/UL (ref 0–0.3)
ABSOLUTE LYMPH #: 1.5 K/UL (ref 1–4.8)
ABSOLUTE MONO #: 0.4 K/UL (ref 0.1–1.2)
ALBUMIN SERPL-MCNC: 4.2 G/DL (ref 3.5–5.2)
ALBUMIN/GLOBULIN RATIO: 1.4 (ref 1–2.5)
ALP BLD-CCNC: 68 U/L (ref 35–104)
ALT SERPL-CCNC: 22 U/L (ref 5–33)
ANION GAP SERPL CALCULATED.3IONS-SCNC: 15 MMOL/L (ref 9–17)
AST SERPL-CCNC: 19 U/L
BASOPHILS # BLD: 1 % (ref 0–2)
BASOPHILS ABSOLUTE: 0 K/UL (ref 0–0.2)
BILIRUB SERPL-MCNC: 0.26 MG/DL (ref 0.3–1.2)
BUN BLDV-MCNC: 16 MG/DL (ref 6–20)
BUN/CREAT BLD: ABNORMAL (ref 9–20)
CALCIUM SERPL-MCNC: 9.3 MG/DL (ref 8.6–10.4)
CHLORIDE BLD-SCNC: 107 MMOL/L (ref 98–107)
CO2: 22 MMOL/L (ref 20–31)
CREAT SERPL-MCNC: 0.64 MG/DL (ref 0.5–0.9)
DIFFERENTIAL TYPE: ABNORMAL
EKG ATRIAL RATE: 61 BPM
EKG P AXIS: 37 DEGREES
EKG P-R INTERVAL: 154 MS
EKG Q-T INTERVAL: 450 MS
EKG QRS DURATION: 94 MS
EKG QTC CALCULATION (BAZETT): 453 MS
EKG R AXIS: 45 DEGREES
EKG T AXIS: 49 DEGREES
EKG VENTRICULAR RATE: 61 BPM
EOSINOPHILS RELATIVE PERCENT: 6 % (ref 1–4)
GFR AFRICAN AMERICAN: >60 ML/MIN
GFR NON-AFRICAN AMERICAN: >60 ML/MIN
GFR SERPL CREATININE-BSD FRML MDRD: ABNORMAL ML/MIN/{1.73_M2}
GFR SERPL CREATININE-BSD FRML MDRD: ABNORMAL ML/MIN/{1.73_M2}
GLUCOSE BLD-MCNC: 95 MG/DL (ref 70–99)
HCT VFR BLD CALC: 44.4 % (ref 36–46)
HEMOGLOBIN: 14.7 G/DL (ref 12–16)
IMMATURE GRANULOCYTES: ABNORMAL %
LIPASE: 82 U/L (ref 13–60)
LYMPHOCYTES # BLD: 24 % (ref 24–44)
MCH RBC QN AUTO: 29.8 PG (ref 26–34)
MCHC RBC AUTO-ENTMCNC: 33 G/DL (ref 31–37)
MCV RBC AUTO: 90.3 FL (ref 80–100)
MONOCYTES # BLD: 7 % (ref 2–11)
NRBC AUTOMATED: ABNORMAL PER 100 WBC
PDW BLD-RTO: 13.9 % (ref 12.5–15.4)
PLATELET # BLD: 194 K/UL (ref 140–450)
PLATELET ESTIMATE: ABNORMAL
PMV BLD AUTO: 9.6 FL (ref 6–12)
POTASSIUM SERPL-SCNC: 4.2 MMOL/L (ref 3.7–5.3)
RBC # BLD: 4.92 M/UL (ref 4–5.2)
RBC # BLD: ABNORMAL 10*6/UL
SEG NEUTROPHILS: 62 % (ref 36–66)
SEGMENTED NEUTROPHILS ABSOLUTE COUNT: 4 K/UL (ref 1.8–7.7)
SODIUM BLD-SCNC: 144 MMOL/L (ref 135–144)
TOTAL PROTEIN: 7.3 G/DL (ref 6.4–8.3)
TROPONIN INTERP: NORMAL
TROPONIN T: <0.03 NG/ML
WBC # BLD: 6.4 K/UL (ref 3.5–11)
WBC # BLD: ABNORMAL 10*3/UL

## 2018-11-02 PROCEDURE — 36415 COLL VENOUS BLD VENIPUNCTURE: CPT

## 2018-11-02 PROCEDURE — 84484 ASSAY OF TROPONIN QUANT: CPT

## 2018-11-02 PROCEDURE — 83690 ASSAY OF LIPASE: CPT

## 2018-11-02 PROCEDURE — 80053 COMPREHEN METABOLIC PANEL: CPT

## 2018-11-02 PROCEDURE — 99284 EMERGENCY DEPT VISIT MOD MDM: CPT

## 2018-11-02 PROCEDURE — 93005 ELECTROCARDIOGRAM TRACING: CPT

## 2018-11-02 PROCEDURE — 76705 ECHO EXAM OF ABDOMEN: CPT

## 2018-11-02 PROCEDURE — 85025 COMPLETE CBC W/AUTO DIFF WBC: CPT

## 2018-11-02 PROCEDURE — 6360000002 HC RX W HCPCS: Performed by: PHYSICIAN ASSISTANT

## 2018-11-02 RX ORDER — ONDANSETRON 4 MG/1
4 TABLET, FILM COATED ORAL ONCE
Status: COMPLETED | OUTPATIENT
Start: 2018-11-02 | End: 2018-11-02

## 2018-11-02 RX ORDER — SUCRALFATE 1 G/1
1 TABLET ORAL 4 TIMES DAILY
Qty: 120 TABLET | Refills: 3 | Status: SHIPPED | OUTPATIENT
Start: 2018-11-02 | End: 2018-11-29

## 2018-11-02 RX ORDER — ONDANSETRON 4 MG/1
4 TABLET, ORALLY DISINTEGRATING ORAL EVERY 6 HOURS PRN
Qty: 10 TABLET | Refills: 1 | Status: SHIPPED | OUTPATIENT
Start: 2018-11-02 | End: 2018-11-05

## 2018-11-02 RX ORDER — ACETAMINOPHEN AND CODEINE PHOSPHATE 300; 30 MG/1; MG/1
1-2 TABLET ORAL 3 TIMES DAILY PRN
Qty: 16 TABLET | Refills: 0 | Status: SHIPPED | OUTPATIENT
Start: 2018-11-02 | End: 2018-11-09

## 2018-11-02 RX ADMIN — ONDANSETRON HYDROCHLORIDE 4 MG: 4 TABLET, FILM COATED ORAL at 13:57

## 2018-11-02 ASSESSMENT — PAIN DESCRIPTION - LOCATION: LOCATION: ABDOMEN

## 2018-11-02 ASSESSMENT — PAIN DESCRIPTION - DESCRIPTORS: DESCRIPTORS: CRAMPING

## 2018-11-02 ASSESSMENT — PAIN SCALES - GENERAL: PAINLEVEL_OUTOF10: 4

## 2018-11-02 ASSESSMENT — PAIN DESCRIPTION - PAIN TYPE: TYPE: ACUTE PAIN

## 2018-11-02 ASSESSMENT — PAIN DESCRIPTION - FREQUENCY: FREQUENCY: CONTINUOUS

## 2018-11-02 NOTE — ED PROVIDER NOTES
Solitary left kidney; and WPW (Emiliana-Parkinson-White syndrome). Surgical History:  has a past surgical history that includes Tonsillectomy; Appendectomy; and Arm Surgery. Social History:  reports that she has been smoking Cigarettes. She has been smoking about 0.50 packs per day. She has never used smokeless tobacco. She reports that she uses drugs, including Marijuana. She reports that she does not drink alcohol. Family History: None  Psychiatric History: None    Allergies:is allergic to pcn [penicillins]. PHYSICAL EXAM     INITIAL VITALS: BP (!) 160/102   Pulse 72   Temp 98.1 °F (36.7 °C) (Oral)   Resp 16   Ht 5' 2\" (1.575 m)   Wt 99.3 kg (219 lb)   SpO2 95%   BMI 40.06 kg/m²   Constitutional:  Well developed   Eyes:  Pupils equal/round  HENT:  Atraumatic, external ears normal, nose normal, oropharynx moist.   Respiratory:  Clear to auscultation bilaterally with good air exchange, no W/R/R  Cardiovascular:  RRR with normal S1 and S2  Gastrointestinal/Abdomen:  Soft,  RUQ/epigastric > LUQ TTP. No peritoneal signs or significant guarding. Obese. No appreciable distention/mass or hernias. BS present. No peritoneal signs. Musculoskeletal:  Normal to inspection  Back:  No back pain. No CVA tenderness. Integument:  No rash. Neurologic:  Alert & age appropriate mentation-interaction      DIAGNOSTIC RESULTS     EKG: All EKG's are interpreted by the Emergency Department Physician who either signs or Co-signs this chart in the absence of a cardiologist.    Not indicated    RADIOLOGY:   Reviewed the radiologist:  [address] [facility] [address] [address]   Final Result   Negative gallbladder ultrasound.                LABS:  Labs Reviewed   COMPREHENSIVE METABOLIC PANEL - Abnormal; Notable for the following:        Result Value    Total Bilirubin 0.26 (*)     All other components within normal limits   LIPASE - Abnormal; Notable for the following:     Lipase 82 (*)     All other components within normal limits   CBC WITH AUTO DIFFERENTIAL - Abnormal; Notable for the following:     Eosinophils % 6 (*)     All other components within normal limits   TROPONIN         EMERGENCY DEPARTMENT COURSE, DDX and MDM:     1400  Continuation of persistent symptoms x 1 month, no drastic change in symptoms/character-location of pain. She has GI appt scheduled but feels she can't wait. She also had outpt US scheduled as well. Nontoxic, afebrile with nonsurgical abd and recent full negative abd labs and CT imaging. I will repeat labs for interval change and complete the RUQ US. Her pain if upper abdomen only, no other areas by exam or history. Staying well hydrated she reports. Zofran ordered, no need for IVF. 200  Pt declined rectal exam.  Trop pending but otherwise only slight elevation of Lipase on our workup. US negative. I discussed all findings with her. Once Trop negative, plan to treat with T#3 prn pain/Zofran/Carafate. She has Omeprazole rx from PCP that she needs to fill. She needs outpt GI f/u, appt scheduled end of this month. Return to ED for worsening symptoms/inability to stay hydrated or other concerns. [address] reviewed, no signs of abuse. Pt ready for discharge. I have reviewed the disposition diagnosis with the patient and or their family/guardian. I have answered their questions and given discharge instructions. They voiced understanding of these instructions and did not have any further questions or complaints. Orders Placed This Encounter   Medications    ondansetron (ZOFRAN) tablet 4 mg    acetaminophen-codeine (TYLENOL/CODEINE #3) 300-30 MG per tablet     Sig: Take 1-2 tablets by mouth 3 times daily as needed for Pain for up to 7 doses. .     Dispense:  16 tablet     Refill:  0    sucralfate (CARAFATE) 1 GM tablet     Sig: Take 1 tablet by mouth 4 times daily     Dispense:  120 tablet     Refill:  3    ondansetron (ZOFRAN ODT) 4 MG disintegrating tablet     Sig: Take 1 tablet by mouth every 6

## 2018-11-02 NOTE — ED PROVIDER NOTES
temperature is 98.1 °F (36.7 °C). Her blood pressure is 160/102 (abnormal) and her pulse is 72. Her respiration is 16 and oxygen saturation is 95%. Heart is regular. Lungs are clear. Patient is exclusively tender in the epigastric area without any peritoneal findings. DIAGNOSTIC RESULTS     EKG: All EKG's are interpreted by the Emergency Department Physician who either signs or Co-signs this chart in the absence of a cardiologist.    EKG as interpreted by myself as showing a normal sinus rhythm with any acute ST segment changes. Rate axis and intervals are normal.    RADIOLOGY:   Non-plain film images such as CT, Ultrasound and MRI are read by the radiologist. Brady Pearson radiographic images are visualized and the radiologist interpretations are reviewed as follows:     Ct Head Wo Contrast    Result Date: 10/5/2018  EXAMINATION: CT OF THE HEAD WITHOUT CONTRAST  10/5/2018 11:31 am TECHNIQUE: CT of the head was performed without the administration of intravenous contrast. Dose modulation, iterative reconstruction, and/or weight based adjustment of the mA/kV was utilized to reduce the radiation dose to as low as reasonably achievable. COMPARISON: None. HISTORY: ORDERING SYSTEM PROVIDED HISTORY: Fall, sequela TECHNOLOGIST PROVIDED HISTORY: post fall headache Ordering Physician Provided Reason for Exam: Left sided headache, blurred vision, dizziness Acuity: Acute Type of Exam: Initial Mechanism of Injury: Fall 2 days ago FINDINGS: BRAIN/VENTRICLES: Ventricles normal in size and location. Basal cisterns normally outlined. No intra or extra-axial hemorrhage. No acute infarct. No abnormal fluid collections. ORBITS: The visualized portion of the orbits demonstrate no acute abnormality. SINUSES: The visualized paranasal sinuses and mastoid air cells demonstrate no acute abnormality. SOFT TISSUES/SKULL:  No acute abnormality of the visualized skull or soft tissues. No acute intracranial abnormality.      Ct 9.3 8.6 - 10.4 mg/dL    Sodium 144 135 - 144 mmol/L    Potassium 4.2 3.7 - 5.3 mmol/L    Chloride 107 98 - 107 mmol/L    CO2 22 20 - 31 mmol/L    Anion Gap 15 9 - 17 mmol/L    Alkaline Phosphatase 68 35 - 104 U/L    ALT 22 5 - 33 U/L    AST 19 <32 U/L    Total Bilirubin 0.26 (L) 0.3 - 1.2 mg/dL    Total Protein 7.3 6.4 - 8.3 g/dL    Alb 4.2 3.5 - 5.2 g/dL    Albumin/Globulin Ratio 1.4 1.0 - 2.5    GFR Non-African American >60 >60 mL/min    GFR African American >60 >60 mL/min    GFR Comment          GFR Staging NOT REPORTED    Lipase   Result Value Ref Range    Lipase 82 (H) 13 - 60 U/L   CBC Auto Differential   Result Value Ref Range    WBC 6.4 3.5 - 11.0 k/uL    RBC 4.92 4.0 - 5.2 m/uL    Hemoglobin 14.7 12.0 - 16.0 g/dL    Hematocrit 44.4 36 - 46 %    MCV 90.3 80 - 100 fL    MCH 29.8 26 - 34 pg    MCHC 33.0 31 - 37 g/dL    RDW 13.9 12.5 - 15.4 %    Platelets 586 158 - 579 k/uL    MPV 9.6 6.0 - 12.0 fL    NRBC Automated NOT REPORTED per 100 WBC    Differential Type NOT REPORTED     Seg Neutrophils 62 36 - 66 %    Lymphocytes 24 24 - 44 %    Monocytes 7 2 - 11 %    Eosinophils % 6 (H) 1 - 4 %    Basophils 1 0 - 2 %    Immature Granulocytes NOT REPORTED 0 %    Segs Absolute 4.00 1.8 - 7.7 k/uL    Absolute Lymph # 1.50 1.0 - 4.8 k/uL    Absolute Mono # 0.40 0.1 - 1.2 k/uL    Absolute Eos # 0.40 0.0 - 0.4 k/uL    Basophils # 0.00 0.0 - 0.2 k/uL    Absolute Immature Granulocyte NOT REPORTED 0.00 - 0.30 k/uL    WBC Morphology NOT REPORTED     RBC Morphology NOT REPORTED     Platelet Estimate NOT REPORTED            EMERGENCY DEPARTMENT COURSE:   Vitals:    Vitals:    11/02/18 1324   BP: (!) 160/102   Pulse: 72   Resp: 16   Temp: 98.1 °F (36.7 °C)   TempSrc: Oral   SpO2: 95%   Weight: 99.3 kg (219 lb)   Height: 5' 2\" (1.575 m)     -------------------------  BP: (!) 160/102, Temp: 98.1 °F (36.7 °C), Pulse: 72, Resp: 16      PERTINENT ATTENDING PHYSICIAN COMMENTS:    Abdominal pain workup has been initiated.   I will look in

## 2018-11-29 ENCOUNTER — OFFICE VISIT (OUTPATIENT)
Dept: GASTROENTEROLOGY | Age: 53
End: 2018-11-29
Payer: COMMERCIAL

## 2018-11-29 VITALS
SYSTOLIC BLOOD PRESSURE: 138 MMHG | OXYGEN SATURATION: 97 % | HEART RATE: 80 BPM | WEIGHT: 215 LBS | BODY MASS INDEX: 42.21 KG/M2 | HEIGHT: 60 IN | DIASTOLIC BLOOD PRESSURE: 88 MMHG

## 2018-11-29 DIAGNOSIS — R10.13 ABDOMINAL PAIN, EPIGASTRIC: ICD-10-CM

## 2018-11-29 DIAGNOSIS — K62.5 RECTAL BLEEDING: ICD-10-CM

## 2018-11-29 DIAGNOSIS — R19.7 DIARRHEA, UNSPECIFIED TYPE: ICD-10-CM

## 2018-11-29 DIAGNOSIS — R11.0 NAUSEA: ICD-10-CM

## 2018-11-29 DIAGNOSIS — K76.0 NONALCOHOLIC FATTY LIVER DISEASE WITHOUT NONALCOHOLIC STEATOHEPATITIS (NASH): Primary | ICD-10-CM

## 2018-11-29 PROCEDURE — G8417 CALC BMI ABV UP PARAM F/U: HCPCS | Performed by: INTERNAL MEDICINE

## 2018-11-29 PROCEDURE — 3017F COLORECTAL CA SCREEN DOC REV: CPT | Performed by: INTERNAL MEDICINE

## 2018-11-29 PROCEDURE — G8484 FLU IMMUNIZE NO ADMIN: HCPCS | Performed by: INTERNAL MEDICINE

## 2018-11-29 PROCEDURE — G8427 DOCREV CUR MEDS BY ELIG CLIN: HCPCS | Performed by: INTERNAL MEDICINE

## 2018-11-29 PROCEDURE — 99244 OFF/OP CNSLTJ NEW/EST MOD 40: CPT | Performed by: INTERNAL MEDICINE

## 2018-11-29 RX ORDER — SODIUM, POTASSIUM,MAG SULFATES 17.5-3.13G
SOLUTION, RECONSTITUTED, ORAL ORAL
Qty: 1 BOTTLE | Refills: 0 | Status: SHIPPED | OUTPATIENT
Start: 2018-11-29 | End: 2019-01-24 | Stop reason: ALTCHOICE

## 2018-11-29 ASSESSMENT — ENCOUNTER SYMPTOMS
ABDOMINAL DISTENTION: 1
CONSTIPATION: 0
NAUSEA: 1
VOMITING: 0
BLOOD IN STOOL: 1
COUGH: 1
ANAL BLEEDING: 1
DIARRHEA: 1
BACK PAIN: 1
ALLERGIC/IMMUNOLOGIC NEGATIVE: 1
ABDOMINAL PAIN: 1
RECTAL PAIN: 0

## 2019-01-09 ENCOUNTER — TELEPHONE (OUTPATIENT)
Dept: GASTROENTEROLOGY | Age: 54
End: 2019-01-09

## 2019-01-24 ENCOUNTER — OFFICE VISIT (OUTPATIENT)
Dept: PRIMARY CARE CLINIC | Age: 54
End: 2019-01-24
Payer: COMMERCIAL

## 2019-01-24 VITALS
WEIGHT: 216.8 LBS | SYSTOLIC BLOOD PRESSURE: 136 MMHG | HEART RATE: 78 BPM | DIASTOLIC BLOOD PRESSURE: 86 MMHG | OXYGEN SATURATION: 99 % | RESPIRATION RATE: 14 BRPM | BODY MASS INDEX: 42.34 KG/M2

## 2019-01-24 DIAGNOSIS — M54.2 NECK PAIN: ICD-10-CM

## 2019-01-24 DIAGNOSIS — F32.A DEPRESSION, UNSPECIFIED DEPRESSION TYPE: ICD-10-CM

## 2019-01-24 DIAGNOSIS — J06.9 UPPER RESPIRATORY TRACT INFECTION, UNSPECIFIED TYPE: Primary | ICD-10-CM

## 2019-01-24 DIAGNOSIS — E03.9 HYPOTHYROIDISM, ADULT: ICD-10-CM

## 2019-01-24 PROCEDURE — G8417 CALC BMI ABV UP PARAM F/U: HCPCS | Performed by: NURSE PRACTITIONER

## 2019-01-24 PROCEDURE — 4004F PT TOBACCO SCREEN RCVD TLK: CPT | Performed by: NURSE PRACTITIONER

## 2019-01-24 PROCEDURE — G8427 DOCREV CUR MEDS BY ELIG CLIN: HCPCS | Performed by: NURSE PRACTITIONER

## 2019-01-24 PROCEDURE — 99214 OFFICE O/P EST MOD 30 MIN: CPT | Performed by: NURSE PRACTITIONER

## 2019-01-24 PROCEDURE — G8484 FLU IMMUNIZE NO ADMIN: HCPCS | Performed by: NURSE PRACTITIONER

## 2019-01-24 PROCEDURE — 3017F COLORECTAL CA SCREEN DOC REV: CPT | Performed by: NURSE PRACTITIONER

## 2019-01-24 RX ORDER — AZITHROMYCIN 250 MG/1
TABLET, FILM COATED ORAL
Qty: 6 TABLET | Refills: 0 | Status: SHIPPED | OUTPATIENT
Start: 2019-01-24 | End: 2019-02-03

## 2019-01-24 RX ORDER — CYCLOBENZAPRINE HCL 5 MG
5 TABLET ORAL 2 TIMES DAILY PRN
Qty: 30 TABLET | Refills: 0 | Status: SHIPPED | OUTPATIENT
Start: 2019-01-24 | End: 2019-02-03

## 2019-01-24 RX ORDER — PREDNISONE 20 MG/1
TABLET ORAL
Qty: 18 TABLET | Refills: 0 | Status: SHIPPED | OUTPATIENT
Start: 2019-01-24 | End: 2019-02-03

## 2019-01-24 RX ORDER — BUPROPION HYDROCHLORIDE 150 MG/1
150 TABLET, EXTENDED RELEASE ORAL 2 TIMES DAILY
Qty: 60 TABLET | Refills: 0 | Status: SHIPPED | OUTPATIENT
Start: 2019-01-24 | End: 2019-02-28 | Stop reason: SDUPTHER

## 2019-01-24 ASSESSMENT — ENCOUNTER SYMPTOMS
SHORTNESS OF BREATH: 0
BACK PAIN: 0
SINUS PAIN: 1
ABDOMINAL PAIN: 0
COUGH: 1

## 2019-01-24 ASSESSMENT — PATIENT HEALTH QUESTIONNAIRE - PHQ9
SUM OF ALL RESPONSES TO PHQ9 QUESTIONS 1 & 2: 2
SUM OF ALL RESPONSES TO PHQ QUESTIONS 1-9: 2
1. LITTLE INTEREST OR PLEASURE IN DOING THINGS: 1
SUM OF ALL RESPONSES TO PHQ QUESTIONS 1-9: 2
2. FEELING DOWN, DEPRESSED OR HOPELESS: 1

## 2019-02-19 ENCOUNTER — TELEPHONE (OUTPATIENT)
Dept: GASTROENTEROLOGY | Age: 54
End: 2019-02-19

## 2019-02-21 ENCOUNTER — HOSPITAL ENCOUNTER (OUTPATIENT)
Age: 54
Setting detail: OUTPATIENT SURGERY
Discharge: HOME | End: 2019-02-21
Attending: INTERNAL MEDICINE | Admitting: INTERNAL MEDICINE
Payer: COMMERCIAL

## 2019-02-21 VITALS
TEMPERATURE: 97.2 F | OXYGEN SATURATION: 96 % | HEIGHT: 62 IN | SYSTOLIC BLOOD PRESSURE: 160 MMHG | DIASTOLIC BLOOD PRESSURE: 103 MMHG | HEART RATE: 83 BPM | BODY MASS INDEX: 39.75 KG/M2 | RESPIRATION RATE: 20 BRPM | WEIGHT: 216 LBS

## 2019-02-21 PROCEDURE — 88342 IMHCHEM/IMCYTCHM 1ST ANTB: CPT

## 2019-02-21 PROCEDURE — 6370000000 HC RX 637 (ALT 250 FOR IP): Performed by: INTERNAL MEDICINE

## 2019-02-21 PROCEDURE — 2709999900 HC NON-CHARGEABLE SUPPLY: Performed by: INTERNAL MEDICINE

## 2019-02-21 PROCEDURE — 7100000011 HC PHASE II RECOVERY - ADDTL 15 MIN: Performed by: INTERNAL MEDICINE

## 2019-02-21 PROCEDURE — 3609012400 HC EGD TRANSORAL BIOPSY SINGLE/MULTIPLE: Performed by: INTERNAL MEDICINE

## 2019-02-21 PROCEDURE — 43239 EGD BIOPSY SINGLE/MULTIPLE: CPT | Performed by: INTERNAL MEDICINE

## 2019-02-21 PROCEDURE — 7100000010 HC PHASE II RECOVERY - FIRST 15 MIN: Performed by: INTERNAL MEDICINE

## 2019-02-21 PROCEDURE — 3609010600 HC COLONOSCOPY POLYPECTOMY SNARE/COLD BIOPSY: Performed by: INTERNAL MEDICINE

## 2019-02-21 PROCEDURE — 2580000003 HC RX 258: Performed by: INTERNAL MEDICINE

## 2019-02-21 PROCEDURE — 45384 COLONOSCOPY W/LESION REMOVAL: CPT | Performed by: INTERNAL MEDICINE

## 2019-02-21 PROCEDURE — 6360000002 HC RX W HCPCS: Performed by: INTERNAL MEDICINE

## 2019-02-21 PROCEDURE — 88305 TISSUE EXAM BY PATHOLOGIST: CPT

## 2019-02-21 PROCEDURE — 99152 MOD SED SAME PHYS/QHP 5/>YRS: CPT | Performed by: INTERNAL MEDICINE

## 2019-02-21 PROCEDURE — 45380 COLONOSCOPY AND BIOPSY: CPT | Performed by: INTERNAL MEDICINE

## 2019-02-21 PROCEDURE — 99153 MOD SED SAME PHYS/QHP EA: CPT | Performed by: INTERNAL MEDICINE

## 2019-02-21 RX ORDER — FENTANYL CITRATE 50 UG/ML
INJECTION, SOLUTION INTRAMUSCULAR; INTRAVENOUS PRN
Status: DISCONTINUED | OUTPATIENT
Start: 2019-02-21 | End: 2019-02-21 | Stop reason: ALTCHOICE

## 2019-02-21 RX ORDER — SODIUM CHLORIDE, SODIUM LACTATE, POTASSIUM CHLORIDE, CALCIUM CHLORIDE 600; 310; 30; 20 MG/100ML; MG/100ML; MG/100ML; MG/100ML
INJECTION, SOLUTION INTRAVENOUS CONTINUOUS
Status: DISCONTINUED | OUTPATIENT
Start: 2019-02-21 | End: 2019-02-21 | Stop reason: HOSPADM

## 2019-02-21 RX ORDER — ONDANSETRON 2 MG/ML
INJECTION INTRAMUSCULAR; INTRAVENOUS PRN
Status: DISCONTINUED | OUTPATIENT
Start: 2019-02-21 | End: 2019-02-21 | Stop reason: ALTCHOICE

## 2019-02-21 RX ORDER — MIDAZOLAM HYDROCHLORIDE 1 MG/ML
INJECTION INTRAMUSCULAR; INTRAVENOUS PRN
Status: DISCONTINUED | OUTPATIENT
Start: 2019-02-21 | End: 2019-02-21 | Stop reason: ALTCHOICE

## 2019-02-21 RX ADMIN — SODIUM CHLORIDE, POTASSIUM CHLORIDE, SODIUM LACTATE AND CALCIUM CHLORIDE: 600; 310; 30; 20 INJECTION, SOLUTION INTRAVENOUS at 07:50

## 2019-02-21 ASSESSMENT — PAIN SCALES - GENERAL: PAINLEVEL_OUTOF10: 0

## 2019-02-21 ASSESSMENT — PAIN - FUNCTIONAL ASSESSMENT: PAIN_FUNCTIONAL_ASSESSMENT: 0-10

## 2019-02-22 LAB — SURGICAL PATHOLOGY REPORT: NORMAL

## 2019-02-28 ENCOUNTER — OFFICE VISIT (OUTPATIENT)
Dept: PRIMARY CARE CLINIC | Age: 54
End: 2019-02-28
Payer: COMMERCIAL

## 2019-02-28 VITALS
DIASTOLIC BLOOD PRESSURE: 82 MMHG | HEART RATE: 81 BPM | SYSTOLIC BLOOD PRESSURE: 136 MMHG | HEIGHT: 62 IN | OXYGEN SATURATION: 96 % | WEIGHT: 212.4 LBS | RESPIRATION RATE: 16 BRPM | BODY MASS INDEX: 39.08 KG/M2

## 2019-02-28 DIAGNOSIS — F32.A DEPRESSION, UNSPECIFIED DEPRESSION TYPE: ICD-10-CM

## 2019-02-28 PROCEDURE — G8417 CALC BMI ABV UP PARAM F/U: HCPCS | Performed by: NURSE PRACTITIONER

## 2019-02-28 PROCEDURE — G8484 FLU IMMUNIZE NO ADMIN: HCPCS | Performed by: NURSE PRACTITIONER

## 2019-02-28 PROCEDURE — 99214 OFFICE O/P EST MOD 30 MIN: CPT | Performed by: NURSE PRACTITIONER

## 2019-02-28 PROCEDURE — G8427 DOCREV CUR MEDS BY ELIG CLIN: HCPCS | Performed by: NURSE PRACTITIONER

## 2019-02-28 PROCEDURE — 4004F PT TOBACCO SCREEN RCVD TLK: CPT | Performed by: NURSE PRACTITIONER

## 2019-02-28 PROCEDURE — 3017F COLORECTAL CA SCREEN DOC REV: CPT | Performed by: NURSE PRACTITIONER

## 2019-02-28 RX ORDER — BUPROPION HYDROCHLORIDE 150 MG/1
150 TABLET, EXTENDED RELEASE ORAL 2 TIMES DAILY
Qty: 60 TABLET | Refills: 2 | Status: SHIPPED | OUTPATIENT
Start: 2019-02-28 | End: 2019-05-30

## 2019-02-28 ASSESSMENT — ENCOUNTER SYMPTOMS
ABDOMINAL PAIN: 0
SHORTNESS OF BREATH: 0
BACK PAIN: 0
COUGH: 0

## 2019-03-04 ENCOUNTER — OFFICE VISIT (OUTPATIENT)
Dept: GASTROENTEROLOGY | Age: 54
End: 2019-03-04
Payer: COMMERCIAL

## 2019-03-04 VITALS
DIASTOLIC BLOOD PRESSURE: 88 MMHG | BODY MASS INDEX: 39.14 KG/M2 | HEART RATE: 73 BPM | SYSTOLIC BLOOD PRESSURE: 136 MMHG | WEIGHT: 214 LBS

## 2019-03-04 DIAGNOSIS — R19.7 DIARRHEA, UNSPECIFIED TYPE: ICD-10-CM

## 2019-03-04 DIAGNOSIS — K76.0 NONALCOHOLIC FATTY LIVER DISEASE WITHOUT NONALCOHOLIC STEATOHEPATITIS (NASH): ICD-10-CM

## 2019-03-04 DIAGNOSIS — R10.13 ABDOMINAL PAIN, EPIGASTRIC: ICD-10-CM

## 2019-03-04 DIAGNOSIS — K90.0 CELIAC DISEASE: Primary | ICD-10-CM

## 2019-03-04 PROCEDURE — 3017F COLORECTAL CA SCREEN DOC REV: CPT | Performed by: INTERNAL MEDICINE

## 2019-03-04 PROCEDURE — G8427 DOCREV CUR MEDS BY ELIG CLIN: HCPCS | Performed by: INTERNAL MEDICINE

## 2019-03-04 PROCEDURE — 4004F PT TOBACCO SCREEN RCVD TLK: CPT | Performed by: INTERNAL MEDICINE

## 2019-03-04 PROCEDURE — G8484 FLU IMMUNIZE NO ADMIN: HCPCS | Performed by: INTERNAL MEDICINE

## 2019-03-04 PROCEDURE — 99214 OFFICE O/P EST MOD 30 MIN: CPT | Performed by: INTERNAL MEDICINE

## 2019-03-04 PROCEDURE — G8417 CALC BMI ABV UP PARAM F/U: HCPCS | Performed by: INTERNAL MEDICINE

## 2019-03-04 ASSESSMENT — ENCOUNTER SYMPTOMS
VOICE CHANGE: 0
CONSTIPATION: 0
RECTAL PAIN: 0
ABDOMINAL DISTENTION: 0
BACK PAIN: 0
SINUS PRESSURE: 0
ANAL BLEEDING: 0
DIARRHEA: 0
WHEEZING: 0
TROUBLE SWALLOWING: 0
VOMITING: 0
COUGH: 0
SORE THROAT: 0
NAUSEA: 0
ABDOMINAL PAIN: 0
CHOKING: 0
BLOOD IN STOOL: 0

## 2019-03-20 ENCOUNTER — TELEPHONE (OUTPATIENT)
Dept: PRIMARY CARE CLINIC | Age: 54
End: 2019-03-20

## 2019-03-22 ENCOUNTER — HOSPITAL ENCOUNTER (EMERGENCY)
Age: 54
Discharge: HOME | End: 2019-03-22
Attending: EMERGENCY MEDICINE
Payer: COMMERCIAL

## 2019-03-22 VITALS
DIASTOLIC BLOOD PRESSURE: 87 MMHG | HEIGHT: 62 IN | BODY MASS INDEX: 37.73 KG/M2 | OXYGEN SATURATION: 98 % | WEIGHT: 205.03 LBS | HEART RATE: 86 BPM | TEMPERATURE: 97.9 F | SYSTOLIC BLOOD PRESSURE: 147 MMHG | RESPIRATION RATE: 14 BRPM

## 2019-03-22 DIAGNOSIS — K08.89 PAIN, DENTAL: Primary | ICD-10-CM

## 2019-03-22 PROCEDURE — 99282 EMERGENCY DEPT VISIT SF MDM: CPT

## 2019-03-22 RX ORDER — IBUPROFEN 600 MG/1
600 TABLET ORAL EVERY 6 HOURS PRN
Qty: 30 TABLET | Refills: 0 | Status: SHIPPED | OUTPATIENT
Start: 2019-03-22 | End: 2019-06-17

## 2019-03-22 RX ORDER — CLINDAMYCIN HYDROCHLORIDE 150 MG/1
300 CAPSULE ORAL 4 TIMES DAILY
Qty: 80 CAPSULE | Refills: 0 | Status: SHIPPED | OUTPATIENT
Start: 2019-03-22 | End: 2019-04-01

## 2019-03-22 ASSESSMENT — ENCOUNTER SYMPTOMS
SORE THROAT: 0
SHORTNESS OF BREATH: 0
NAUSEA: 0
EYE REDNESS: 0
COUGH: 0
BACK PAIN: 0
EYE DISCHARGE: 0
VOMITING: 0
ABDOMINAL PAIN: 0

## 2019-03-22 ASSESSMENT — PAIN SCALES - GENERAL: PAINLEVEL_OUTOF10: 7

## 2019-03-22 ASSESSMENT — PAIN DESCRIPTION - LOCATION: LOCATION: TEETH

## 2019-03-22 ASSESSMENT — PAIN DESCRIPTION - ORIENTATION: ORIENTATION: LEFT;LOWER

## 2019-03-22 ASSESSMENT — PAIN DESCRIPTION - PAIN TYPE: TYPE: ACUTE PAIN

## 2019-04-11 ENCOUNTER — OFFICE VISIT (OUTPATIENT)
Dept: PRIMARY CARE CLINIC | Age: 54
End: 2019-04-11
Payer: COMMERCIAL

## 2019-04-11 VITALS
RESPIRATION RATE: 17 BRPM | OXYGEN SATURATION: 98 % | BODY MASS INDEX: 40.99 KG/M2 | HEART RATE: 83 BPM | HEIGHT: 60 IN | SYSTOLIC BLOOD PRESSURE: 140 MMHG | WEIGHT: 208.8 LBS | DIASTOLIC BLOOD PRESSURE: 90 MMHG

## 2019-04-11 DIAGNOSIS — R42 DIZZINESS: ICD-10-CM

## 2019-04-11 DIAGNOSIS — I10 ESSENTIAL HYPERTENSION: Primary | ICD-10-CM

## 2019-04-11 PROCEDURE — 4004F PT TOBACCO SCREEN RCVD TLK: CPT | Performed by: NURSE PRACTITIONER

## 2019-04-11 PROCEDURE — G8417 CALC BMI ABV UP PARAM F/U: HCPCS | Performed by: NURSE PRACTITIONER

## 2019-04-11 PROCEDURE — 99215 OFFICE O/P EST HI 40 MIN: CPT | Performed by: NURSE PRACTITIONER

## 2019-04-11 PROCEDURE — G8427 DOCREV CUR MEDS BY ELIG CLIN: HCPCS | Performed by: NURSE PRACTITIONER

## 2019-04-11 PROCEDURE — 3017F COLORECTAL CA SCREEN DOC REV: CPT | Performed by: NURSE PRACTITIONER

## 2019-04-11 RX ORDER — METOPROLOL TARTRATE 50 MG/1
50 TABLET, FILM COATED ORAL 2 TIMES DAILY
Qty: 60 TABLET | Refills: 3 | Status: SHIPPED | OUTPATIENT
Start: 2019-04-11 | End: 2019-05-30 | Stop reason: ALTCHOICE

## 2019-04-11 ASSESSMENT — ENCOUNTER SYMPTOMS
COUGH: 0
BACK PAIN: 0
ABDOMINAL PAIN: 0
SHORTNESS OF BREATH: 0

## 2019-04-11 ASSESSMENT — PATIENT HEALTH QUESTIONNAIRE - PHQ9
1. LITTLE INTEREST OR PLEASURE IN DOING THINGS: 0
SUM OF ALL RESPONSES TO PHQ QUESTIONS 1-9: 0
2. FEELING DOWN, DEPRESSED OR HOPELESS: 0
SUM OF ALL RESPONSES TO PHQ9 QUESTIONS 1 & 2: 0
SUM OF ALL RESPONSES TO PHQ QUESTIONS 1-9: 0

## 2019-04-11 NOTE — PROGRESS NOTES
[address] PRIMARY CARE  [address]  [facility] [address]  [facility] [address]  Dept: [phone]  Dept Fax: [phone]    Jeff iLz is a 48 y.o. female who presentstoday for her medical conditions/complaints as noted below. Jeff Liz is c/o of  Chief Complaint   Patient presents with    Follow-Up from [facility]     [facility] 03/17/2019 Dizzy, passed out at work            HPI:     Presents for hospital follow up, admitted to [facility] 3/17/19-3/20/19  Per hospital summary in [address] is a 48 y.o. female with past medical history of isaac Parkinson white status post ablation, congenital renal agenesis, adrenal mass, celiac disease, hypothyroidism, and hypertension presented to the emergency department with complaints of dizziness. Dizziness started 2-3 days prior to the admission and she mentioned symptom has been intermittent. She had about 1-2 episodes per day. On the day of admission while she was at work she had extreme dizziness to the point that her coworkers had to support her to get to chair. This morning symptoms started as she get up from chair and started to walking. She denied losing consciousness however she admits brief movement that she does not remember. She did not fell. Patient explain dizziness as her head spinning and no sensation of room spinning. Patient has not had similar episode in the past. Patient mentioned her blood pressure was 215/120 at the work. Patient denied decrease oral intake however she admits excessive sweating. Hospital Course  Patient was admitted to the Internal Medicine service. Cardiology consult further recommendation echocardiogram was done which shows normal heart function. Patient was put on cardiac monitoring however no abnormal rhythms detected. Patient's thyroid function was tested and had TSH is normal level.  ESR and CRP was normal. Patient blood pressure during the stay was elevated therefore started on Norvasc 5 mg. From chart review was found that patient had workup for adrenal mass in 2016, which determined to be benign. Patients symptoms improved since admission therefore she was discharged home and advised to follow up with the primary care physician for further evaluation. Her gastric biopsy from the last colonoscopy was positive for H pylori therefore she was advised to follow up with GI for treatment. \"    Returned to ER on 3/22/19 for dental pain  Had follow up with dentist and had tooth extracted, has been fine ever since    Not currently taking norvasc, she forgets to use it  Willing to take a BP med, however states she feels better on metoprolol than norvasc      Hemoglobin A1C (%)   Date Value   10/18/2017 5.4             ( goal A1C is < 7)   No results found for: LABMICR  LDL Cholesterol (mg/dL)   Date Value   05/31/2018        10/18/2017 167 (H)       (goal LDL is <100)   AST (U/L)   Date Value   11/02/2018 19     ALT (U/L)   Date Value   11/02/2018 22     BUN (mg/dL)   Date Value   11/02/2018 16     BP Readings from Last 3 Encounters:   04/11/19 (!) 140/90   03/22/19 (!) 147/87   03/04/19 136/88          ([id number])    Past Medical History:   Diagnosis Date    Anxiety     Chronic back pain     Hypertension     Mass of adrenal gland ([address]     Obesity     Solitary left kidney     WPW (Emiliana-Parkinson-White syndrome) 2009      Past Surgical History:   Procedure Laterality Date    ABLATION OF DYSRHYTHMIC FOCUS      x 2-3 due to WPW    APPENDECTOMY      ARM SURGERY      COLONOSCOPY N/A 2/21/2019    COLONOSCOPY POLYPECTOMY SNARE/COLD BIOPSY performed by Isai Davis MD at [facility] N/A 2/21/2019    EGD BIOPSY performed by Isai Davis MD at [address] History   Problem Relation Age of Onset    Stroke Mother     Cancer Mother         bladder    Diabetes Father     Diabetes Sister     Ovarian Cancer Sister self-injury, sleep disturbance and suicidal ideas. The patient is not nervous/anxious. Objective:     Physical Exam   Constitutional: She is oriented to person, place, and time. She appears well-developed and well-nourished. HENT:   Head: Normocephalic and atraumatic. Eyes: Pupils are equal, round, and reactive to light. Neck: Normal range of motion. Neck supple. Cardiovascular: Normal rate, regular rhythm and normal heart sounds. Pulmonary/Chest: Effort normal and breath sounds normal.   Abdominal: Soft. Bowel sounds are normal. There is no tenderness. Musculoskeletal: Normal range of motion. Neurological: She is alert and oriented to person, place, and time. Skin: Skin is warm and dry. Psychiatric: She has a normal mood and affect. Her behavior is normal. Judgment and thought content normal.   Nursing note and vitals reviewed. BP (!) 140/90   Pulse 83   Resp 17   Ht 5' (1.524 m)   Wt 208 lb 12.8 oz (94.7 kg)   SpO2 98%   Breastfeeding? No   BMI 40.78 kg/m²     Assessment:       Diagnosis Orders   1. Essential hypertension     2. Dizziness               Plan:      Return for in May as scheduled . 1. HTN- Rx given for lopressor, she will take twice daily with wellbutrin. Counseled on s/s hypotension. Follow up in May as scheduled for recheck. 2. Dizziness- Resolved. Patient feels was related to tooth. Follow up as needed. Of the 40 minute duration appointment visit, Andrew Bravo [facility] spent at least 50% of the face-to-face time in counseling, explanation of diagnosis, planning of further management, and answering all questions. Orders Placed This Encounter   Medications    metoprolol tartrate (LOPRESSOR) 50 MG tablet     Sig: Take 1 tablet by mouth 2 times daily Take one tablet two times a day 12 hours apart for high blood pressure. Dispense:  60 tablet     Refill:  3       Patient given educational materials - see patient instructions. Discussed use, benefit, and side effects of prescribed medications. All patientquestions answered. Pt voiced understanding. Reviewed health maintenance. Instructedto continue current medications, diet and exercise. Patient agreed with treatmentplan. Follow up as directed.      Electronicallysigned by DK Moffett CNP on 4/11/2019 at 9:07 AM

## 2019-05-09 ENCOUNTER — HOSPITAL ENCOUNTER (OUTPATIENT)
Age: 54
Discharge: HOME | End: 2019-05-09
Payer: COMMERCIAL

## 2019-05-09 ENCOUNTER — HOSPITAL ENCOUNTER (OUTPATIENT)
Dept: MAMMOGRAPHY | Age: 54
Discharge: HOME | End: 2019-05-11
Payer: COMMERCIAL

## 2019-05-09 DIAGNOSIS — Z12.31 ENCOUNTER FOR SCREENING MAMMOGRAM FOR BREAST CANCER: ICD-10-CM

## 2019-05-09 DIAGNOSIS — K90.0 CELIAC DISEASE: ICD-10-CM

## 2019-05-09 PROCEDURE — 83516 IMMUNOASSAY NONANTIBODY: CPT

## 2019-05-09 PROCEDURE — 36415 COLL VENOUS BLD VENIPUNCTURE: CPT

## 2019-05-09 PROCEDURE — 82784 ASSAY IGA/IGD/IGG/IGM EACH: CPT

## 2019-05-09 PROCEDURE — 77063 BREAST TOMOSYNTHESIS BI: CPT

## 2019-05-10 LAB
GLIADIN DEAMINIDATED PEPTIDE AB IGA: 1.8 U/ML
GLIADIN DEAMINIDATED PEPTIDE AB IGG: <0.4 U/ML
IGA: 137 MG/DL (ref 70–400)
TISSUE TRANSGLUTAMINASE IGA: 0.2 U/ML

## 2019-05-30 ENCOUNTER — OFFICE VISIT (OUTPATIENT)
Dept: PRIMARY CARE CLINIC | Age: 54
End: 2019-05-30
Payer: COMMERCIAL

## 2019-05-30 VITALS
HEART RATE: 85 BPM | DIASTOLIC BLOOD PRESSURE: 86 MMHG | BODY MASS INDEX: 38.16 KG/M2 | WEIGHT: 207.4 LBS | HEIGHT: 62 IN | RESPIRATION RATE: 16 BRPM | OXYGEN SATURATION: 98 % | SYSTOLIC BLOOD PRESSURE: 136 MMHG

## 2019-05-30 DIAGNOSIS — F32.A DEPRESSION, UNSPECIFIED DEPRESSION TYPE: Primary | ICD-10-CM

## 2019-05-30 DIAGNOSIS — E78.2 HYPERLIPIDEMIA, MIXED: ICD-10-CM

## 2019-05-30 DIAGNOSIS — Z00.00 ENCOUNTER FOR GENERAL ADULT MEDICAL EXAMINATION W/O ABNORMAL FINDINGS: ICD-10-CM

## 2019-05-30 DIAGNOSIS — Z13.1 SCREENING FOR DIABETES MELLITUS: ICD-10-CM

## 2019-05-30 DIAGNOSIS — Z13.0 SCREENING FOR DEFICIENCY ANEMIA: ICD-10-CM

## 2019-05-30 DIAGNOSIS — E03.9 HYPOTHYROIDISM, ADULT: ICD-10-CM

## 2019-05-30 PROCEDURE — 3017F COLORECTAL CA SCREEN DOC REV: CPT | Performed by: NURSE PRACTITIONER

## 2019-05-30 PROCEDURE — G8417 CALC BMI ABV UP PARAM F/U: HCPCS | Performed by: NURSE PRACTITIONER

## 2019-05-30 PROCEDURE — 99214 OFFICE O/P EST MOD 30 MIN: CPT | Performed by: NURSE PRACTITIONER

## 2019-05-30 PROCEDURE — G8427 DOCREV CUR MEDS BY ELIG CLIN: HCPCS | Performed by: NURSE PRACTITIONER

## 2019-05-30 PROCEDURE — 4004F PT TOBACCO SCREEN RCVD TLK: CPT | Performed by: NURSE PRACTITIONER

## 2019-05-30 RX ORDER — BUPROPION HYDROCHLORIDE 200 MG/1
200 TABLET, EXTENDED RELEASE ORAL 2 TIMES DAILY
Qty: 60 TABLET | Refills: 0 | Status: SHIPPED | OUTPATIENT
Start: 2019-05-30 | End: 2019-07-22 | Stop reason: SDUPTHER

## 2019-05-30 RX ORDER — DIAPER,BRIEF,INFANT-TODD,DISP
EACH MISCELLANEOUS
Qty: 1 TUBE | Refills: 0 | Status: SHIPPED | OUTPATIENT
Start: 2019-05-30 | End: 2019-06-06

## 2019-05-30 ASSESSMENT — ENCOUNTER SYMPTOMS
SHORTNESS OF BREATH: 0
BACK PAIN: 0
COUGH: 0
ABDOMINAL PAIN: 0

## 2019-05-30 ASSESSMENT — PATIENT HEALTH QUESTIONNAIRE - PHQ9
1. LITTLE INTEREST OR PLEASURE IN DOING THINGS: 1
SUM OF ALL RESPONSES TO PHQ9 QUESTIONS 1 & 2: 2
2. FEELING DOWN, DEPRESSED OR HOPELESS: 1
SUM OF ALL RESPONSES TO PHQ QUESTIONS 1-9: 2
SUM OF ALL RESPONSES TO PHQ QUESTIONS 1-9: 2

## 2019-06-03 ENCOUNTER — TELEPHONE (OUTPATIENT)
Dept: PRIMARY CARE CLINIC | Age: 54
End: 2019-06-03

## 2019-06-03 NOTE — TELEPHONE ENCOUNTER
Pt having horrible tooth pain. Is seeing dentist tomorrow at 2:40. Is currently taking 400 mg of ibuprofen every 5 hours, pt is wondering how much ibuprofen she should have today d/t kidney issues?

## 2019-06-17 ENCOUNTER — HOSPITAL ENCOUNTER (EMERGENCY)
Age: 54
Discharge: HOME | End: 2019-06-17
Attending: EMERGENCY MEDICINE
Payer: COMMERCIAL

## 2019-06-17 VITALS
WEIGHT: 207 LBS | TEMPERATURE: 98.4 F | RESPIRATION RATE: 16 BRPM | DIASTOLIC BLOOD PRESSURE: 93 MMHG | SYSTOLIC BLOOD PRESSURE: 156 MMHG | BODY MASS INDEX: 38.09 KG/M2 | OXYGEN SATURATION: 95 % | HEART RATE: 95 BPM | HEIGHT: 62 IN

## 2019-06-17 DIAGNOSIS — M62.838 SPASM OF MUSCLE: Primary | ICD-10-CM

## 2019-06-17 LAB
-: ABNORMAL
ABSOLUTE EOS #: 0.3 K/UL (ref 0–0.4)
ABSOLUTE IMMATURE GRANULOCYTE: NORMAL K/UL (ref 0–0.3)
ABSOLUTE LYMPH #: 1.5 K/UL (ref 1–4.8)
ABSOLUTE MONO #: 0.4 K/UL (ref 0.1–1.2)
AMORPHOUS: ABNORMAL
ANION GAP SERPL CALCULATED.3IONS-SCNC: 11 MMOL/L (ref 9–17)
BACTERIA: ABNORMAL
BASOPHILS # BLD: 1 % (ref 0–2)
BASOPHILS ABSOLUTE: 0 K/UL (ref 0–0.2)
BILIRUBIN URINE: NEGATIVE
BUN BLDV-MCNC: 25 MG/DL (ref 6–20)
BUN/CREAT BLD: ABNORMAL (ref 9–20)
CALCIUM SERPL-MCNC: 9.6 MG/DL (ref 8.6–10.4)
CASTS UA: ABNORMAL /LPF
CHLORIDE BLD-SCNC: 104 MMOL/L (ref 98–107)
CO2: 27 MMOL/L (ref 20–31)
COLOR: YELLOW
COMMENT UA: ABNORMAL
CREAT SERPL-MCNC: 0.99 MG/DL (ref 0.5–0.9)
CRYSTALS, UA: ABNORMAL /HPF
DIFFERENTIAL TYPE: NORMAL
EOSINOPHILS RELATIVE PERCENT: 4 % (ref 1–4)
EPITHELIAL CELLS UA: ABNORMAL /HPF
GFR AFRICAN AMERICAN: >60 ML/MIN
GFR NON-AFRICAN AMERICAN: 59 ML/MIN
GFR SERPL CREATININE-BSD FRML MDRD: ABNORMAL ML/MIN/{1.73_M2}
GFR SERPL CREATININE-BSD FRML MDRD: ABNORMAL ML/MIN/{1.73_M2}
GLUCOSE BLD-MCNC: 127 MG/DL (ref 70–99)
GLUCOSE URINE: NEGATIVE
HCT VFR BLD CALC: 38.6 % (ref 36–46)
HEMOGLOBIN: 13.4 G/DL (ref 12–16)
IMMATURE GRANULOCYTES: NORMAL %
KETONES, URINE: NEGATIVE
LEUKOCYTE ESTERASE, URINE: ABNORMAL
LYMPHOCYTES # BLD: 24 % (ref 24–44)
MCH RBC QN AUTO: 31 PG (ref 26–34)
MCHC RBC AUTO-ENTMCNC: 34.6 G/DL (ref 31–37)
MCV RBC AUTO: 89.7 FL (ref 80–100)
MONOCYTES # BLD: 6 % (ref 2–11)
MUCUS: ABNORMAL
NITRITE, URINE: NEGATIVE
NRBC AUTOMATED: NORMAL PER 100 WBC
OTHER OBSERVATIONS UA: ABNORMAL
PDW BLD-RTO: 13.9 % (ref 12.5–15.4)
PH UA: 6
PLATELET # BLD: 219 K/UL (ref 140–450)
PLATELET ESTIMATE: NORMAL
PMV BLD AUTO: 8.5 FL (ref 6–12)
POTASSIUM SERPL-SCNC: 3.9 MMOL/L (ref 3.7–5.3)
PROTEIN UA: NEGATIVE
RBC # BLD: 4.31 M/UL (ref 4–5.2)
RBC # BLD: NORMAL 10*6/UL
RBC UA: ABNORMAL /HPF
RENAL EPITHELIAL, UA: ABNORMAL /HPF
SEG NEUTROPHILS: 65 % (ref 36–66)
SEGMENTED NEUTROPHILS ABSOLUTE COUNT: 4.1 K/UL (ref 1.8–7.7)
SODIUM BLD-SCNC: 142 MMOL/L (ref 135–144)
SPECIFIC GRAVITY UA: 1.02
TRICHOMONAS: ABNORMAL
TURBIDITY: CLEAR
URINE HGB: ABNORMAL
UROBILINOGEN, URINE: NORMAL
WBC # BLD: 6.3 K/UL (ref 3.5–11)
WBC # BLD: NORMAL 10*3/UL
WBC UA: ABNORMAL /HPF
YEAST: ABNORMAL

## 2019-06-17 PROCEDURE — 81001 URINALYSIS AUTO W/SCOPE: CPT

## 2019-06-17 PROCEDURE — 87086 URINE CULTURE/COLONY COUNT: CPT

## 2019-06-17 PROCEDURE — 80048 BASIC METABOLIC PNL TOTAL CA: CPT

## 2019-06-17 PROCEDURE — 36415 COLL VENOUS BLD VENIPUNCTURE: CPT

## 2019-06-17 PROCEDURE — 85025 COMPLETE CBC W/AUTO DIFF WBC: CPT

## 2019-06-17 PROCEDURE — 99283 EMERGENCY DEPT VISIT LOW MDM: CPT

## 2019-06-17 RX ORDER — LIDOCAINE 50 MG/G
1-2 PATCH TOPICAL DAILY
Qty: 15 PATCH | Refills: 0 | Status: SHIPPED | OUTPATIENT
Start: 2019-06-17 | End: 2019-08-06

## 2019-06-17 RX ORDER — ORPHENADRINE CITRATE 100 MG/1
100 TABLET, EXTENDED RELEASE ORAL 2 TIMES DAILY
Qty: 14 TABLET | Refills: 0 | Status: SHIPPED | OUTPATIENT
Start: 2019-06-17 | End: 2019-06-27

## 2019-06-17 ASSESSMENT — PAIN DESCRIPTION - PAIN TYPE: TYPE: ACUTE PAIN

## 2019-06-17 ASSESSMENT — PAIN DESCRIPTION - ORIENTATION: ORIENTATION: RIGHT

## 2019-06-17 ASSESSMENT — PAIN SCALES - GENERAL: PAINLEVEL_OUTOF10: 2

## 2019-06-17 ASSESSMENT — PAIN DESCRIPTION - LOCATION: LOCATION: BACK

## 2019-06-17 ASSESSMENT — PAIN DESCRIPTION - DESCRIPTORS: DESCRIPTORS: STABBING

## 2019-06-17 NOTE — ED NOTES
Pt to exam room 7 without difficulty with c/o right sided back pain. reported that she got home from work around [phone] [id number], had sudden onset right sided stabbing back pain, pain lasted for several minutes, pain has almost entirely subsided, pain is 2/10. and denies having a right kidney but did state theres some nodules or clusters on her right side. She denies constipation or diarrhea and denies urinary symptoms.       Emani Bah RN  06/17/19 9124

## 2019-06-17 NOTE — ED PROVIDER NOTES
Emergency Department         COMPLAINT       Chief Complaint   Patient presents with    Back Pain     right side      PHYSICAL EXAM      ED Triage Vitals   BP Temp Temp src Pulse Resp SpO2 Height Weight   06/17/19 1513 06/17/19 1514 -- 06/17/19 1514 06/17/19 1514 06/17/19 1514 06/17/19 1514 06/17/19 1514   (!) 160/83 98.4 °F (36.9 °C)  95 16 95 % 5' 2\" (1.575 m) 207 lb (93.9 kg)         Constitutional: Alert, oriented x3, nontoxic, answering questions appropriately, acting properly for age, in no acute distress   HEENT: Extraocular muscles intact, mucus membranes moist,  Neck: Trachea midline   Cardiovascular: Regular rhythm and rate no S3, S4, or murmurs   Respiratory: Clear to auscultation bilaterally no wheezes, rhonchi, rales, no respiratory distress no tachypnea no retractions no hypoxia  Gastrointestinal: Soft, nontender, nondistended, positive bowel sounds. No rebound, rigidity, or guarding.   No pulsatile mass no abdominal bruit  Musculoskeletal: No extremity pain or swelling no flank tenderness to palpation no ecchymosis no rash  Neurologic: Moving all 4 extremities without difficulty there are no gross focal neurologic deficits   Skin: Warm and dry         Physical Exam  DIAGNOSTIC RESULTS     EKG: All EKG's areinterpreted by the Emergency Department Physician who either signs or Co-signs this chart in the absence of a cardiologist.    Not indicated unless otherwise documented above or in the midlevel documentation    LABS:  Results for orders placed or performed during the hospital encounter of 06/17/19   CBC Auto Differential   Result Value Ref Range    WBC 6.3 3.5 - 11.0 k/uL    RBC 4.31 4.0 - 5.2 m/uL    Hemoglobin 13.4 12.0 - 16.0 g/dL    Hematocrit 38.6 36 - 46 %    MCV 89.7 80 - 100 fL    MCH 31.0 26 - 34 pg    MCHC 34.6 31 - 37 g/dL    RDW 13.9 12.5 - 15.4 %    Platelets 924 845 - 338 k/uL    MPV 8.5 6.0 - 12.0 fL    NRBC Automated NOT REPORTED per 100 WBC    Differential Type NOT REPORTED     Seg Neutrophils 65 36 - 66 %    Lymphocytes 24 24 - 44 %    Monocytes 6 2 - 11 %    Eosinophils % 4 1 - 4 %    Basophils 1 0 - 2 %    Immature Granulocytes NOT REPORTED 0 %    Segs Absolute 4.10 1.8 - 7.7 k/uL    Absolute Lymph # 1.50 1.0 - 4.8 k/uL    Absolute Mono # 0.40 0.1 - 1.2 k/uL    Absolute Eos # 0.30 0.0 - 0.4 k/uL    Basophils # 0.00 0.0 - 0.2 k/uL    Absolute Immature Granulocyte NOT REPORTED 0.00 - 0.30 k/uL    WBC Morphology NOT REPORTED     RBC Morphology NOT REPORTED     Platelet Estimate NOT REPORTED    Urinalysis Reflex to Culture   Result Value Ref Range    Color, UA YELLOW YELLOW    Turbidity UA CLEAR CLEAR    Glucose, Ur NEGATIVE NEGATIVE    Bilirubin Urine NEGATIVE NEGATIVE    Ketones, Urine NEGATIVE NEGATIVE    Specific [place], UA 1.025     Urine Hgb TRACE (A) NEGATIVE    pH, UA 6.0     Protein, UA NEGATIVE NEGATIVE    Urobilinogen, Urine Normal Normal    Nitrite, Urine NEGATIVE NEGATIVE    Leukocyte Esterase, Urine TRACE (A) NEGATIVE    Urinalysis Comments NOT REPORTED    Basic Metabolic Panel   Result Value Ref Range    Glucose 127 (H) 70 - 99 mg/dL    BUN 25 (H) 6 - 20 mg/dL    CREATININE 0.99 (H) 0.50 - 0.90 mg/dL    Bun/Cre Ratio NOT REPORTED 9 - 20    Calcium 9.6 8.6 - 10.4 mg/dL    Sodium 142 135 - 144 mmol/L    Potassium 3.9 3.7 - 5.3 mmol/L    Chloride 104 98 - 107 mmol/L    CO2 27 20 - 31 mmol/L    Anion Gap 11 9 - 17 mmol/L    GFR Non-African American 59 (L) >60 mL/min    GFR African American >60 >60 mL/min    GFR Comment          GFR Staging NOT REPORTED    Microscopic Urinalysis   Result Value Ref Range    -          WBC, UA 0 TO 2 /HPF    RBC, UA 0 TO 2 /HPF    Casts UA NOT REPORTED /LPF    Crystals UA NOT REPORTED None /HPF    Epithelial Cells UA 0 TO 2 /HPF    Renal Epithelial, Urine NOT REPORTED 0 /HPF    Bacteria, UA NOT REPORTED None    Mucus, UA NOT REPORTED None    Trichomonas, UA NOT REPORTED None    Amorphous, UA NOT REPORTED None    Other Observations UA Culture ordered based on defined criteria. (A) NOT REQ. Yeast, UA NOT REPORTED None       Not indicated unless otherwise documented above or in the midlevel documentation    RADIOLOGY:   I reviewedthe radiologist interpretations:  No orders to display       Not indicated unless otherwise documented above or in the midlevel documentation    EMERGENCY DEPARTMENT COURSE:       PERTINENT ATTENDING PHYSICIAN COMMENTS:    Sudden onset of right flank pain while sitting down. Does not have a right kidney. Denies fevers or chills no recent illnesses no injury. Pain much improved currently she rates a 2/10 described as stabbing nonradiating nothing makes it better movement makes it worse. History of muscle spasms but not this intense and not in this location. Denies hematuria or dysuria  refused pain control at this time. Will check labs      4:45 PM trace hemoglobin. No signs of infection. Normal kidney function normal white blood cell count. Offered CAT scan she says she would like to go home. Reviewed previous CAT scan from October of last year which does not show a right kidney. Possible muscle spasm. Will discharge. Faculty Attestation    I performed a history and physical examination of the patient and discussed management with the mid level provideer. I reviewed the mid level provider's note and agree with the documented findings and plan of care. Any areas of disagreement are noted on the chart. I was personally present for the key portions of any procedures. I have documented in the chart those procedures where I was not present during the key portions. I have reviewed the emergency nurses triage note. I agree with the chief complaint, past medical history, past surgical history, allergies, medications, social and family history as documented unless otherwise noted below.  Documentation of the HPI, Physical Exam and Medical Decision Making performed by medical students or scribes is based on my personal performance of the HPI, PE and MDM. For Physician Assistant/ Nurse Practitioner cases/documentation I have personally evaluated this patient and have completed at least one if not all key elements of the E/M (history, physical exam, and MDM). Additional findings are as noted.        Mark Caballero,   06/17/19 2350

## 2019-06-17 NOTE — ED PROVIDER NOTES
Resp SpO2 Height Weight   06/17/19 1513 06/17/19 1514 -- 06/17/19 1514 06/17/19 1514 06/17/19 1514 06/17/19 1514 06/17/19 1514   (!) 160/83 98.4 °F (36.9 °C)  95 16 95 % 5' 2\" (1.575 m) 207 lb (93.9 kg)       Physical Exam   Nursing note and vitals reviewed. Constitutional: Well-developed, well-nourished  Head: Normocephalic and atraumatic. Ear: External ears normal.   Nose: Nose normal and midline. Eyes: Conjunctivae and EOM are normal. Pupils are equal, round  Neck: Normal range of motion. Neck supple, no midline TTP. Cardiovascular: Normal rate, regular rhythm, normal heart sounds  Pulmonary/Chest: Effort normal and breath sounds normal. No respiratory distress. No wheezes. No rales. Abdominal: Soft. Bowel sounds are normal. No distension and no mass. There is no tenderness. There is no rebound and no guarding. Musculoskeletal:  No midline back tenderness. Normal ROM that does elicit some mild pain of right flank/upper lumbar paraspinal area. No rash/edema/erythema. No step-off deformity, no swelling, no bruising. No saddle paresthesias. NV intact distally x4. Neurological: Alert & age appropriate mentation/interaction   Skin: Skin is warm and dry. No vesicular rash noted.    Psychiatric: Mood, memory, affect and judgment normal.           DIAGNOSTIC RESULTS     RADIOLOGY:   Non-plain film images such as CT, Ultrasound and MRI are read by the radiologist. Plain radiographic images are visualized and preliminarily interpreted by the emergency physician with the below findings:    Interpretation per the Radiologist below, if available at the time of this note:    No orders to display       Not indicated    LABS:  Labs Reviewed   URINE RT REFLEX TO CULTURE - Abnormal; Notable for the following components:       Result Value    Urine Hgb TRACE (*)     Leukocyte Esterase, Urine TRACE (*)     All other components within normal limits   BASIC METABOLIC PANEL - Abnormal; Notable for the following components:    Glucose 127 (*)     BUN 25 (*)     CREATININE 0.99 (*)     GFR Non- 59 (*)     All other components within normal limits   MICROSCOPIC URINALYSIS - Abnormal; Notable for the following components:    Other Observations UA Culture ordered based on defined criteria. (*)     All other components within normal limits   URINE CULTURE CLEAN CATCH   CBC WITH AUTO DIFFERENTIAL       All other labs were within normal range or not returned as of this dictation. EMERGENCY DEPARTMENT COURSE and DIFFERENTIAL DIAGNOSIS/MDM:   Vitals:    Vitals:    06/17/19 1513 06/17/19 1514 06/17/19 1517   BP: (!) 160/83 (!) 160/83 (!) 156/93   Pulse:  95    Resp:  16    Temp:  98.4 °F (36.9 °C)    SpO2:  95% 95%   Weight:  93.9 kg (207 lb)    Height:  5' 2\" (1.575 m)      1535  Checking basic labs/UA with her involuted right kidney. Abd benign, LCTA. Clinically this looks like muscular back pain elicited with movement/ROM on my exam.   1700  Discussed findings with pt. She declined CT A/P for stone at this time. Treating with muscle relaxer and Lidoderm patches for muscle strain/back pain. She was directed to return to ED for worsening symptoms or other concerns. She was agreeable to our plan and had not further questions. CONSULTS:  None    PROCEDURES:  None    The patient presents with low back pain without signs of spinal cord compression, cauda equina syndrome, infection, aneurysm or other serious etiology. The patient is neurologically intact. Given the extremely low risk of these diagnoses further testing and evaluation for these possibilities does not appear to be indicated at this time. The patient has been instructed to return if the symptoms worsen or change in any way. I have reviewed the disposition diagnosis with the patient and or their family/guardian. I have answered their questions and given discharge instructions.   They voiced understanding of these instructions and did not have any further questions or complaints. FINAL IMPRESSION      1. Spasm of muscle          DISPOSITION/PLAN   DISPOSITION Decision To Discharge    PATIENT REFERRED TO:  Corry Alonzo, DK - CNP  [address]  [phone]    Schedule an appointment as soon as possible for a visit in 3 days  for re-evaluation of your symptoms      DISCHARGE MEDICATIONS:  New Prescriptions    LIDOCAINE (LIDODERM) 5 %    Place 1-2 patches onto the skin daily 12 hours on, 12 hours off.     ORPHENADRINE (NORFLEX) 100 MG EXTENDED RELEASE TABLET    Take 1 tablet by mouth 2 times daily       (Please note that portions of this note were completed with a voice recognition program.  Efforts were made to edit the dictations but occasionally words are mis-transcribed.)    KORINA Samuels PA-C  06/17/19 [id number]

## 2019-06-18 LAB
CULTURE: NORMAL
Lab: NORMAL
SPECIMEN DESCRIPTION: NORMAL

## 2019-06-20 ENCOUNTER — HOSPITAL ENCOUNTER (OUTPATIENT)
Age: 54
Discharge: HOME | End: 2019-06-20
Payer: COMMERCIAL

## 2019-06-20 DIAGNOSIS — Z13.0 SCREENING FOR DEFICIENCY ANEMIA: ICD-10-CM

## 2019-06-20 DIAGNOSIS — E03.9 HYPOTHYROIDISM, ADULT: ICD-10-CM

## 2019-06-20 DIAGNOSIS — Z13.1 SCREENING FOR DIABETES MELLITUS: ICD-10-CM

## 2019-06-20 DIAGNOSIS — E78.2 HYPERLIPIDEMIA, MIXED: ICD-10-CM

## 2019-06-20 DIAGNOSIS — E78.5 DYSLIPIDEMIA: Primary | ICD-10-CM

## 2019-06-20 LAB
ALBUMIN SERPL-MCNC: 4.1 G/DL (ref 3.5–5.2)
ALBUMIN/GLOBULIN RATIO: 1.2 (ref 1–2.5)
ALP BLD-CCNC: 72 U/L (ref 35–104)
ALT SERPL-CCNC: 24 U/L (ref 5–33)
ANION GAP SERPL CALCULATED.3IONS-SCNC: 14 MMOL/L (ref 9–17)
AST SERPL-CCNC: 19 U/L
BILIRUB SERPL-MCNC: 0.26 MG/DL (ref 0.3–1.2)
BUN BLDV-MCNC: 17 MG/DL (ref 6–20)
BUN/CREAT BLD: ABNORMAL (ref 9–20)
CALCIUM SERPL-MCNC: 9 MG/DL (ref 8.6–10.4)
CHLORIDE BLD-SCNC: 106 MMOL/L (ref 98–107)
CHOLESTEROL/HDL RATIO: 5.4
CHOLESTEROL: 303 MG/DL
CO2: 22 MMOL/L (ref 20–31)
CREAT SERPL-MCNC: 0.72 MG/DL (ref 0.5–0.9)
GFR AFRICAN AMERICAN: >60 ML/MIN
GFR NON-AFRICAN AMERICAN: >60 ML/MIN
GFR SERPL CREATININE-BSD FRML MDRD: ABNORMAL ML/MIN/{1.73_M2}
GFR SERPL CREATININE-BSD FRML MDRD: ABNORMAL ML/MIN/{1.73_M2}
GLUCOSE BLD-MCNC: 98 MG/DL (ref 70–99)
HCT VFR BLD CALC: 45.6 % (ref 36.3–47.1)
HDLC SERPL-MCNC: 56 MG/DL
HEMOGLOBIN: 13.8 G/DL (ref 11.9–15.1)
LDL CHOLESTEROL: 206 MG/DL (ref 0–130)
MCH RBC QN AUTO: 29.2 PG (ref 25.2–33.5)
MCHC RBC AUTO-ENTMCNC: 30.3 G/DL (ref 28.4–34.8)
MCV RBC AUTO: 96.6 FL (ref 82.6–102.9)
NRBC AUTOMATED: 0 PER 100 WBC
PDW BLD-RTO: 13.1 % (ref 11.8–14.4)
PLATELET # BLD: 226 K/UL (ref 138–453)
PMV BLD AUTO: 11.3 FL (ref 8.1–13.5)
POTASSIUM SERPL-SCNC: 4.4 MMOL/L (ref 3.7–5.3)
RBC # BLD: 4.72 M/UL (ref 3.95–5.11)
SODIUM BLD-SCNC: 142 MMOL/L (ref 135–144)
TOTAL PROTEIN: 7.4 G/DL (ref 6.4–8.3)
TRIGL SERPL-MCNC: 203 MG/DL
TSH SERPL DL<=0.05 MIU/L-ACNC: 3.87 MIU/L (ref 0.3–5)
VLDLC SERPL CALC-MCNC: ABNORMAL MG/DL (ref 1–30)
WBC # BLD: 6.1 K/UL (ref 3.5–11.3)

## 2019-06-20 PROCEDURE — 80061 LIPID PANEL: CPT

## 2019-06-20 PROCEDURE — 36415 COLL VENOUS BLD VENIPUNCTURE: CPT

## 2019-06-20 PROCEDURE — 80053 COMPREHEN METABOLIC PANEL: CPT

## 2019-06-20 PROCEDURE — 84443 ASSAY THYROID STIM HORMONE: CPT

## 2019-06-20 PROCEDURE — 85027 COMPLETE CBC AUTOMATED: CPT

## 2019-06-20 RX ORDER — ATORVASTATIN CALCIUM 20 MG/1
20 TABLET, FILM COATED ORAL DAILY
Qty: 30 TABLET | Refills: 5 | Status: SHIPPED | OUTPATIENT
Start: 2019-06-20 | End: 2019-08-29 | Stop reason: SDUPTHER

## 2019-06-26 ENCOUNTER — TELEPHONE (OUTPATIENT)
Dept: PRIMARY CARE CLINIC | Age: 54
End: 2019-06-26

## 2019-06-26 NOTE — TELEPHONE ENCOUNTER
Patient called requesting letter for off work today, she states she has had insomnia and has not slept all night. Please advise.

## 2019-06-27 ENCOUNTER — HOSPITAL ENCOUNTER (OUTPATIENT)
Age: 54
Setting detail: SPECIMEN
Discharge: HOME | End: 2019-06-27
Payer: COMMERCIAL

## 2019-06-27 ENCOUNTER — OFFICE VISIT (OUTPATIENT)
Dept: OBGYN CLINIC | Age: 54
End: 2019-06-27
Payer: COMMERCIAL

## 2019-06-27 VITALS
HEIGHT: 62 IN | WEIGHT: 206 LBS | DIASTOLIC BLOOD PRESSURE: 72 MMHG | BODY MASS INDEX: 37.91 KG/M2 | SYSTOLIC BLOOD PRESSURE: 122 MMHG

## 2019-06-27 DIAGNOSIS — Z01.419 ENCOUNTER FOR ANNUAL ROUTINE GYNECOLOGICAL EXAMINATION: Primary | ICD-10-CM

## 2019-06-27 DIAGNOSIS — Z78.0 POSTMENOPAUSAL: ICD-10-CM

## 2019-06-27 DIAGNOSIS — Z13.820 SCREENING FOR OSTEOPOROSIS: ICD-10-CM

## 2019-06-27 PROCEDURE — 99386 PREV VISIT NEW AGE 40-64: CPT | Performed by: NURSE PRACTITIONER

## 2019-06-27 ASSESSMENT — ENCOUNTER SYMPTOMS
NAUSEA: 0
COLOR CHANGE: 0
COUGH: 0
RHINORRHEA: 0
CONSTIPATION: 0
ABDOMINAL PAIN: 0
BACK PAIN: 0
SHORTNESS OF BREATH: 0
DIARRHEA: 0
VOMITING: 0

## 2019-06-27 NOTE — PROGRESS NOTES
Simeon Ames is a 48 y.o.  here for her annual exam.  The patient was seen and examined. The patients past medical, surgical, social and family history were reviewed. Current medications and allergies were reviewed, and documented in the chart. She is single , states she has only ever been in same sex relationships. She is gainfully employed at a hotel. Exercise no regular exercise but stays very active at job  Diet Yes and has been doing gluten free since finding out has celiac and has lost weight  Tobacco abuse Yes- pcp has he michael Wellbutrin has decreased to 3 cigarettes a day    Last PAP: unsure maybe 10 + years ago, hx of abnormal PAP no  Family hx uterine or ovarian cancer-sister ovarian, paternal great gm  Pt had CT in 2018 no ovarian masses noted  Last mammogram- 19-normal, Family hx of breast cancer -denies  Last Dexa scan if indicated- has never had  Colon cancer screening- colonoscopy 2019- polyp, family hx colon cancer -denies        Sexually active: no, multiple partners: No, Dyspareunia: No, Vaginal discharge: no,  UTI symptoms: no, voiding difficulties: no, bowels regular:Yes bloating:no      Menstrual history:  Menarche age- 17 or 17, LMP at age 45. Denies hot flashes vaginal dryness.      OB History    Para Term  AB Living   0 0 0 0 0 0   SAB TAB Ectopic Molar Multiple Live Births   0 0 0 0 0 0       Vitals:    19 1014   BP: 122/72   Site: Right Upper Arm   Position: Sitting   Cuff Size: Medium Adult   Weight: 206 lb (93.4 kg)   Height: 5' 2\" (1.575 m)       Wt Readings from Last 3 Encounters:   19 206 lb (93.4 kg)   19 207 lb (93.9 kg)   19 207 lb 6.4 oz (94.1 kg)     Past Medical History:   Diagnosis Date    Anxiety     Chronic back pain     Hypertension     Mass of adrenal gland ([address]     Obesity     Solitary left kidney     WPW (Emiliana-Parkinson-White syndrome)  Past Surgical History:   Procedure Laterality Date    ABLATION OF DYSRHYTHMIC FOCUS      x 2-3 due to WPW    APPENDECTOMY      ARM SURGERY      COLONOSCOPY N/A 2/21/2019    COLONOSCOPY POLYPECTOMY SNARE/COLD BIOPSY performed by Charlie Medellin MD at [address] [place] [address] ENDOSCOPY N/A 2/21/2019    EGD BIOPSY performed by Charlie Medellin MD at [facility] History   Problem Relation Age of Onset    Stroke Mother     Cancer Mother         bladder    Diabetes Father     Diabetes Sister     Ovarian Cancer Sister      Social History     Tobacco Use   Smoking Status Current Every Day Smoker    Packs/day: 0.10    Types: Cigarettes   Smokeless Tobacco Never Used   Tobacco Comment    decreasing iwth wellbutrin, currently about 3 cigarettes per day     Social History     Substance and Sexual Activity   Alcohol Use No        Social History     Tobacco History     Smoking Status  Current Every Day Smoker Smoking Frequency  0.1 packs/day Smoking Tobacco Type  Cigarettes    Smokeless Tobacco Use  Never Used    Tobacco Comment  decreasing iwth wellbutrin, currently about 3 cigarettes per day          Alcohol History     Alcohol Use Status  No          Drug Use     Drug Use Status  Yes Types  Marijuana Comment  helps with back pain           Sexual Activity     Sexually Active  Never              Allergies   Allergen Reactions    Pcn [Penicillins] Hives     Current Outpatient Medications   Medication Sig Dispense Refill    atorvastatin (LIPITOR) 20 MG tablet Take 1 tablet by mouth daily 30 tablet 5    lidocaine (LIDODERM) 5 % Place 1-2 patches onto the skin daily 12 hours on, 12 hours off. 15 patch 0    buPROPion (WELLBUTRIN SR) 200 MG extended release tablet Take 1 tablet by mouth 2 times daily 60 tablet 0     No current facility-administered medications for this visit.           Subjective:     Review of Systems   Constitutional: Negative for chills, fatigue, fever and unexpected weight change. HENT: Negative for congestion and rhinorrhea. Eyes: Negative for visual disturbance. Respiratory: Negative for cough and shortness of breath. Cardiovascular: Negative for chest pain, palpitations and leg swelling. Gastrointestinal: Negative for abdominal pain, constipation, diarrhea, nausea and vomiting. Endocrine: Negative for cold intolerance, heat intolerance, polydipsia and polyuria. Genitourinary: Negative for dyspareunia, dysuria, flank pain, menstrual problem, pelvic pain, vaginal bleeding, vaginal discharge and vaginal pain. Musculoskeletal: Negative for back pain and myalgias. Skin: Negative for color change and rash. Neurological: Negative for dizziness, light-headedness and headaches. Hematological: Negative for adenopathy. Does not bruise/bleed easily. Psychiatric/Behavioral: Negative for self-injury and suicidal ideas. Objective:     Physical Exam   Constitutional: She is oriented to person, place, and time. She appears well-developed and well-nourished. No distress. HENT:   Head: Normocephalic and atraumatic. Right Ear: External ear normal.   Left Ear: External ear normal.   Nose: Nose normal.   Mouth/Throat: Oropharynx is clear and moist.   Eyes: Pupils are equal, round, and reactive to light. EOM are normal.   Neck: Normal range of motion. Neck supple. No thyromegaly present. Cardiovascular: Normal rate, regular rhythm and normal heart sounds. Exam reveals no gallop and no friction rub. No murmur heard. No bilateral calf tenderness or swelling   Pulmonary/Chest: Effort normal and breath sounds normal. No respiratory distress. She has no wheezes. Abdominal: Soft. Bowel sounds are normal. There is no tenderness. Genitourinary:   Genitourinary Comments: Breasts nipples everted, no masses or tenderness, does BSE  Vulva-no lesions  Vagina-pink rugated  Cervix-firm, 2 cm. Nontender, freely movable, no lesions  Uterus-ant.  Smooth, firm, Comment:  COLONOSCOPY POLYPECTOMY SNARE/COLD BIOPSY performed by                Tammy Agustin MD at [address] date: TONSILLECTOMY  2/21/2019: UPPER GASTROINTESTINAL ENDOSCOPY; N/A      Comment:  EGD BIOPSY performed by Tammy Agustin MD at [address] History    Tobacco Use      Smoking status: Current Every Day Smoker        Packs/day: 0.10        Types: Cigarettes      Smokeless tobacco: Never Used      Tobacco comment: decreasing iwth wellbutrin, currently about 3 cigarettes per day       Standing Status:   Future     Standing Expiration Date:   6/27/2020     Order Specific Question:   Collection Type     Answer: Thin Prep     Order Specific Question:   Prior Abnormal Pap Test     Answer:   No     Order Specific Question:   Screening or Diagnostic     Answer:   Screening     Order Specific Question:   HPV Requested? Answer:   Yes     Order Specific Question:   High Risk Patient     Answer:   N/A       Patient given educational materials - seepatient instructions. Discussed use, benefit, and side effects of prescribed medications. All patient questions answered. Pt voiced understanding. Reviewed health maintenance. Instructed to continue current medications, diet and exercise. Patient agreedwith treatment plan. Follow up as directed.       Electronically signed by DK Johnson CNP on 6/27/2019at 10:32 AM

## 2019-06-27 NOTE — PATIENT INSTRUCTIONS
Patient Education      Patient Education      Patient Education        Learning About Benefits From Quitting Smoking  How does quitting smoking make you healthier? If you're thinking about quitting smoking, you may have a few reasons to be smoke-free. Your health may be one of them. · When you quit smoking, you lower your risks for cancer, lung disease, heart attack, stroke, blood vessel disease, and blindness from macular degeneration. · When you're smoke-free, you get sick less often, and you heal faster. You are less likely to get colds, flu, bronchitis, and pneumonia. · As a nonsmoker, you may find that your mood is better and you are less stressed. When and how will you feel healthier? Quitting has real health benefits that start from day 1 of being smoke-free. And the longer you stay smoke-free, the healthier you get and the better you feel. The first hours  · After just 20 minutes, your blood pressure and heart rate go down. That means there's less stress on your heart and blood vessels. · Within 12 hours, the level of carbon monoxide in your blood drops back to normal. That makes room for more oxygen. With more oxygen in your body, you may notice that you have more energy than when you smoked. After 2 weeks  · Your lungs start to work better. · Your risk of heart attack starts to drop. After 1 month  · When your lungs are clear, you cough less and breathe deeper, so it's easier to be active. · Your sense of taste and smell return. That means you can enjoy food more than you have since you started smoking. Over the years  · After 1 year, your risk of heart disease is half what it would be if you kept smoking. · After 5 years, your risk of stroke starts to shrink. Within a few years after that, it's about the same as if you'd never smoked. · After 10 years, your risk of dying from lung cancer is cut by about half. And your risk for many other types of cancer is lower too.   How would quitting help others in your life? When you quit smoking, you improve the health of everyone who now breathes in your smoke. · Their heart, lung, and cancer risks drop, much like yours. · They are sick less. For babies and small children, living smoke-free means they're less likely to have ear infections, pneumonia, and bronchitis. · If you're a woman who is or will be pregnant someday, quitting smoking means a healthier . · Children who are close to you are less likely to become adult smokers. Where can you learn more? Go to https://TheRanking.compepicewShapeUp.Redbooth. org and sign in to your [facility] account. Enter [id number] in the [facility] box to learn more about \"Learning About Benefits From Quitting Smoking. \"     If you do not have an account, please click on the \"Sign Up Now\" link. Current as of: 2018  Content Version: 12.0  © 7184-2940 [employer]. Care instructions adapted under license by [facility] ([facility]). If you have questions about a medical condition or this instruction, always ask your healthcare professional. [place] [address] any warranty or liability for your use of this information. Learning About Breast Cancer Screening  What is breast cancer screening? Breast cancer occurs when cells that are not normal grow in one or both of your breasts. Screening tests can help find breast cancer early. Cancer is easier to treat when it's found early. Having concerns about breast cancer is common. That's why it's important to talk with your doctor about when to start and how often to get screened for breast cancer. How is breast cancer screening done? Several screening tests can be used to check for breast cancer. · Mammograms check for signs of cancer using X-rays. They can show tumors that are too small for you or your doctor to feel. During a mammogram, a machine squeezes your breasts to make them flatter and easier to X-ray.  At least two pictures are taken of each breast. One is taken from the top and one from the side. · 3-D mammograms are also called digital breast tomosynthesis. Your breast is positioned on a flat plate. A top plate is pressed against your breast to keep it in position. The X-ray arm then moves in an arc above the breast and takes many pictures. A computer uses these X-rays to create a three-dimensional image. · Clinical breast exams are a doctor's exam. Your doctor carefully feels your breasts and under your arms to check for lumps or other changes. After the screening, your doctor will tell you the results. You will also be told if you need any follow-up tests. When should you get screened? Talk with your doctor about when you should start being tested for breast cancer. How often you get tested and the kind of tests you get will depend on your age and your risk. The guidelines that follow are for women who have an average risk for breast cancer. If you have a higher risk for breast cancer, such as having a family history of breast cancer in multiple relatives or at a young age, your doctor may recommend different screening for you. · Ages 21 to 44: Some experts recommend that women have a clinical breast exam every 3 years, starting at age 21. Ask your doctor how often you should have this test. If you have a high risk for breast cancer, talk with your doctor about when to start yearly mammograms and other screening tests. · Ages 36 and older: Talk with your doctor about how often you should have mammograms and clinical breast exams. What is your risk for breast cancer? If you don't already know your risk of breast cancer, you can ask your doctor about it. You can also look it up at www.cancer.gov/bcrisktool/. If your doctor says that you have a high or very high risk, ask about ways to reduce your risk. These could include getting extra screening, taking medicine, or having surgery.  If you have a strong family back.  Follow-up care is a key part of your treatment and safety. Be sure to make and go to all appointments, and call your doctor if you are having problems. It's also a good idea to know your test results and keep a list of the medicines you take. What do the results mean? · A normal result means that the test did not find any abnormal cells in the sample. · An abnormal result can mean many things. Most of these are not cancer. The results of your test may be abnormal because:  ? You have an infection of the vagina or cervix, such as a yeast infection. ? You have an IUD (intrauterine device for birth control). ? You have low estrogen levels after menopause that are causing the cells to change. ? You have cell changes that may be a sign of precancer or cancer. The results are ranked based on how serious the changes might be. There are many other reasons why you might not get a normal result. If the results were abnormal, you may need to get another test within a few weeks or months. If the results show changes that could be a sign of cancer, you may need a test called a colposcopy, which provides a more complete view of the cervix. Sometimes the lab cannot use the sample because it does not contain enough cells or was not preserved well. If so, you may need to have the test again. This is not common, but it does happen from time to time. When should you call for help? Watch closely for changes in your health, and be sure to contact your doctor if:    · You have vaginal bleeding or pain for more than 2 days after the test. It is normal to have a small amount of bleeding for a day or two after the test.   Where can you learn more? Go to https://PharmaNation.Pitadela. org and sign in to your [employer] account. Enter R298 in the [employer] box to learn more about \"Pap Test: Care Instructions. \"     If you do not have an account, please click on the \"Sign Up Now\" link.   Current as of: December 19, 2018  Content Version: 12.0  © 4408-3067 [employer]. Care instructions adapted under license by [facility] ([facility]). If you have questions about a medical condition or this instruction, always ask your healthcare professional. [address] any warranty or liability for your use of this information.

## 2019-06-29 LAB
HPV SAMPLE: NORMAL
HPV, GENOTYPE 16: NOT DETECTED
HPV, GENOTYPE 18: NOT DETECTED
HPV, HIGH RISK OTHER: NOT DETECTED
HPV, INTERPRETATION: NORMAL
SPECIMEN DESCRIPTION: NORMAL

## 2019-07-01 LAB — CYTOLOGY REPORT: NORMAL

## 2019-07-10 ENCOUNTER — HOSPITAL ENCOUNTER (OUTPATIENT)
Dept: WOMENS IMAGING | Age: 54
Discharge: HOME | End: 2019-07-12
Payer: COMMERCIAL

## 2019-07-10 DIAGNOSIS — Z78.0 POSTMENOPAUSAL: ICD-10-CM

## 2019-07-10 DIAGNOSIS — Z13.820 SCREENING FOR OSTEOPOROSIS: ICD-10-CM

## 2019-07-10 PROCEDURE — 77080 DXA BONE DENSITY AXIAL: CPT

## 2019-07-22 DIAGNOSIS — F32.A DEPRESSION, UNSPECIFIED DEPRESSION TYPE: ICD-10-CM

## 2019-07-22 RX ORDER — BUPROPION HYDROCHLORIDE 200 MG/1
200 TABLET, EXTENDED RELEASE ORAL 2 TIMES DAILY
Qty: 60 TABLET | Refills: 5 | Status: SHIPPED | OUTPATIENT
Start: 2019-07-22 | End: 2019-11-06

## 2019-08-02 ENCOUNTER — TELEPHONE (OUTPATIENT)
Dept: GASTROENTEROLOGY | Age: 54
End: 2019-08-02

## 2019-08-02 PROBLEM — K90.0 TRANSIENT GLUTEN SENSITIVITY: Status: ACTIVE | Noted: 2019-02-21

## 2019-08-06 ENCOUNTER — HOSPITAL ENCOUNTER (EMERGENCY)
Age: 54
Discharge: HOME | End: 2019-08-06
Attending: SPECIALIST
Payer: COMMERCIAL

## 2019-08-06 VITALS
TEMPERATURE: 98.1 F | SYSTOLIC BLOOD PRESSURE: 196 MMHG | OXYGEN SATURATION: 97 % | HEART RATE: 97 BPM | HEIGHT: 62 IN | RESPIRATION RATE: 18 BRPM | WEIGHT: 210 LBS | DIASTOLIC BLOOD PRESSURE: 124 MMHG | BODY MASS INDEX: 38.64 KG/M2

## 2019-08-06 DIAGNOSIS — L23.7 POISON IVY DERMATITIS: Primary | ICD-10-CM

## 2019-08-06 PROCEDURE — 6370000000 HC RX 637 (ALT 250 FOR IP): Performed by: SPECIALIST

## 2019-08-06 PROCEDURE — 99282 EMERGENCY DEPT VISIT SF MDM: CPT

## 2019-08-06 RX ORDER — FAMOTIDINE 20 MG/1
20 TABLET, FILM COATED ORAL 2 TIMES DAILY
Qty: 10 TABLET | Refills: 0 | Status: SHIPPED | OUTPATIENT
Start: 2019-08-06 | End: 2019-08-20 | Stop reason: ALTCHOICE

## 2019-08-06 RX ORDER — CETIRIZINE HYDROCHLORIDE 10 MG/1
10 TABLET ORAL ONCE
Status: COMPLETED | OUTPATIENT
Start: 2019-08-06 | End: 2019-08-06

## 2019-08-06 RX ORDER — CETIRIZINE HYDROCHLORIDE 10 MG/1
10 TABLET ORAL DAILY
Qty: 10 TABLET | Refills: 0 | Status: SHIPPED | OUTPATIENT
Start: 2019-08-06 | End: 2019-08-20 | Stop reason: ALTCHOICE

## 2019-08-06 RX ORDER — PREDNISONE 20 MG/1
60 TABLET ORAL ONCE
Status: COMPLETED | OUTPATIENT
Start: 2019-08-06 | End: 2019-08-06

## 2019-08-06 RX ORDER — FAMOTIDINE 20 MG/1
20 TABLET, FILM COATED ORAL ONCE
Status: COMPLETED | OUTPATIENT
Start: 2019-08-06 | End: 2019-08-06

## 2019-08-06 RX ORDER — PREDNISONE 20 MG/1
20 TABLET ORAL 2 TIMES DAILY
Qty: 10 TABLET | Refills: 0 | Status: SHIPPED | OUTPATIENT
Start: 2019-08-06 | End: 2019-08-11

## 2019-08-06 RX ADMIN — CETIRIZINE HYDROCHLORIDE 10 MG: 10 TABLET, FILM COATED ORAL at 23:38

## 2019-08-06 RX ADMIN — PREDNISONE 60 MG: 20 TABLET ORAL at 23:38

## 2019-08-06 RX ADMIN — FAMOTIDINE 20 MG: 20 TABLET ORAL at 23:38

## 2019-08-07 ASSESSMENT — ENCOUNTER SYMPTOMS
SHORTNESS OF BREATH: 0
WHEEZING: 0

## 2019-08-07 NOTE — ED PROVIDER NOTES
ivy dermatitis    DIAGNOSTIC RESULTS     EKG: All EKG's are interpreted by the Emergency Department Physician who either signs or Co-signs this chart in the absence of a cardiologist.    None obtained    RADIOLOGY:   Non-plain film images such as CT, Ultrasound and MRI are read by the radiologist. Plain radiographic images are visualized and the radiologist interpretations are reviewed as follows:     None obtained    LABS:  No results found for this visit on 08/06/19. EMERGENCY DEPARTMENT COURSE:   Vitals:    Vitals:    08/06/19 2255   BP: (!) 196/124   Pulse: 97   Resp: 18   Temp: 98.1 °F (36.7 °C)   TempSrc: Oral   SpO2: 97%   Weight: 95.3 kg (210 lb)   Height: 5' 2\" (1.575 m)     -------------------------  BP: (!) 196/124, Temp: 98.1 °F (36.7 °C), Pulse: 97, Resp: 18    Orders Placed This Encounter   Medications    predniSONE (DELTASONE) tablet 60 mg    famotidine (PEPCID) tablet 20 mg    cetirizine (ZYRTEC) tablet 10 mg    predniSONE (DELTASONE) 20 MG tablet     Sig: Take 1 tablet by mouth 2 times daily for 5 days     Dispense:  10 tablet     Refill:  0    famotidine (PEPCID) 20 MG tablet     Sig: Take 1 tablet by mouth 2 times daily for 10 doses     Dispense:  10 tablet     Refill:  0    cetirizine (ZYRTEC ALLERGY) 10 MG tablet     Sig: Take 1 tablet by mouth daily     Dispense:  10 tablet     Refill:  0         Patient was given prednisone 60 mg, Pepcid 20 mg and Zyrtec 10 mg orally and then discharged home on prednisone for 5 days, Pepcid for 5 days and Zyrtec once a day. She is advised to follow-up with PCP, return if worse. The patient has been informed that they may have pre-hypertension or Hypertension based on a blood pressure reading in the emergency department.  I recommend that the patient call the primary care provider listed on their discharge instructions or a physician of their choice this week to arrange follow up for further evaluation of possible pre-hypertension or

## 2019-08-20 ENCOUNTER — OFFICE VISIT (OUTPATIENT)
Dept: PRIMARY CARE CLINIC | Age: 54
End: 2019-08-20
Payer: COMMERCIAL

## 2019-08-20 VITALS
SYSTOLIC BLOOD PRESSURE: 132 MMHG | BODY MASS INDEX: 39.87 KG/M2 | DIASTOLIC BLOOD PRESSURE: 74 MMHG | RESPIRATION RATE: 16 BRPM | HEART RATE: 91 BPM | OXYGEN SATURATION: 98 % | WEIGHT: 218 LBS

## 2019-08-20 DIAGNOSIS — M62.830 BACK MUSCLE SPASM: Primary | ICD-10-CM

## 2019-08-20 DIAGNOSIS — I10 ESSENTIAL HYPERTENSION: ICD-10-CM

## 2019-08-20 DIAGNOSIS — F34.1 DYSTHYMIA: ICD-10-CM

## 2019-08-20 PROCEDURE — 99214 OFFICE O/P EST MOD 30 MIN: CPT | Performed by: INTERNAL MEDICINE

## 2019-08-20 PROCEDURE — G8427 DOCREV CUR MEDS BY ELIG CLIN: HCPCS | Performed by: INTERNAL MEDICINE

## 2019-08-20 PROCEDURE — G8417 CALC BMI ABV UP PARAM F/U: HCPCS | Performed by: INTERNAL MEDICINE

## 2019-08-20 PROCEDURE — 3017F COLORECTAL CA SCREEN DOC REV: CPT | Performed by: INTERNAL MEDICINE

## 2019-08-20 PROCEDURE — 4004F PT TOBACCO SCREEN RCVD TLK: CPT | Performed by: INTERNAL MEDICINE

## 2019-08-20 RX ORDER — CYCLOBENZAPRINE HCL 5 MG
5 TABLET ORAL NIGHTLY PRN
Qty: 30 TABLET | Refills: 0 | Status: SHIPPED | OUTPATIENT
Start: 2019-08-20 | End: 2019-08-30

## 2019-08-22 ASSESSMENT — ENCOUNTER SYMPTOMS
COUGH: 0
WHEEZING: 0
ABDOMINAL PAIN: 0
VOMITING: 0
NAUSEA: 0
BACK PAIN: 1
SHORTNESS OF BREATH: 0
SINUS PRESSURE: 0
CONSTIPATION: 0
DIARRHEA: 0
ABDOMINAL DISTENTION: 0
SINUS PAIN: 0

## 2019-08-29 ENCOUNTER — OFFICE VISIT (OUTPATIENT)
Dept: PRIMARY CARE CLINIC | Age: 54
End: 2019-08-29
Payer: COMMERCIAL

## 2019-08-29 VITALS
SYSTOLIC BLOOD PRESSURE: 134 MMHG | RESPIRATION RATE: 16 BRPM | HEART RATE: 74 BPM | BODY MASS INDEX: 40.67 KG/M2 | WEIGHT: 221 LBS | HEIGHT: 62 IN | DIASTOLIC BLOOD PRESSURE: 84 MMHG

## 2019-08-29 DIAGNOSIS — E78.5 DYSLIPIDEMIA: ICD-10-CM

## 2019-08-29 DIAGNOSIS — F32.A DEPRESSION, UNSPECIFIED DEPRESSION TYPE: Primary | ICD-10-CM

## 2019-08-29 PROCEDURE — G8427 DOCREV CUR MEDS BY ELIG CLIN: HCPCS | Performed by: NURSE PRACTITIONER

## 2019-08-29 PROCEDURE — G8417 CALC BMI ABV UP PARAM F/U: HCPCS | Performed by: NURSE PRACTITIONER

## 2019-08-29 PROCEDURE — 99214 OFFICE O/P EST MOD 30 MIN: CPT | Performed by: NURSE PRACTITIONER

## 2019-08-29 PROCEDURE — 4004F PT TOBACCO SCREEN RCVD TLK: CPT | Performed by: NURSE PRACTITIONER

## 2019-08-29 PROCEDURE — 3017F COLORECTAL CA SCREEN DOC REV: CPT | Performed by: NURSE PRACTITIONER

## 2019-08-29 RX ORDER — ATORVASTATIN CALCIUM 20 MG/1
20 TABLET, FILM COATED ORAL DAILY
Qty: 30 TABLET | Refills: 5 | Status: SHIPPED | OUTPATIENT
Start: 2019-08-29 | End: 2019-11-06

## 2019-08-29 RX ORDER — AMITRIPTYLINE HYDROCHLORIDE 50 MG/1
50 TABLET, FILM COATED ORAL NIGHTLY
Qty: 30 TABLET | Refills: 0 | Status: SHIPPED | OUTPATIENT
Start: 2019-08-29 | End: 2019-09-25

## 2019-08-29 RX ORDER — CLOTRIMAZOLE AND BETAMETHASONE DIPROPIONATE 10; .64 MG/G; MG/G
CREAM TOPICAL
Qty: 1 TUBE | Refills: 0 | Status: SHIPPED | OUTPATIENT
Start: 2019-08-29 | End: 2019-09-25

## 2019-08-29 ASSESSMENT — ENCOUNTER SYMPTOMS
BACK PAIN: 0
COUGH: 0
ABDOMINAL PAIN: 0
SHORTNESS OF BREATH: 0

## 2019-09-13 ENCOUNTER — OFFICE VISIT (OUTPATIENT)
Dept: PRIMARY CARE CLINIC | Age: 54
End: 2019-09-13
Payer: COMMERCIAL

## 2019-09-13 VITALS
HEART RATE: 93 BPM | HEIGHT: 62 IN | BODY MASS INDEX: 40.65 KG/M2 | OXYGEN SATURATION: 97 % | WEIGHT: 220.9 LBS | SYSTOLIC BLOOD PRESSURE: 138 MMHG | DIASTOLIC BLOOD PRESSURE: 90 MMHG | RESPIRATION RATE: 18 BRPM

## 2019-09-13 DIAGNOSIS — M54.42 LEFT-SIDED LOW BACK PAIN WITH BILATERAL SCIATICA, UNSPECIFIED CHRONICITY: Primary | ICD-10-CM

## 2019-09-13 DIAGNOSIS — M54.41 LEFT-SIDED LOW BACK PAIN WITH BILATERAL SCIATICA, UNSPECIFIED CHRONICITY: Primary | ICD-10-CM

## 2019-09-13 PROCEDURE — G8417 CALC BMI ABV UP PARAM F/U: HCPCS | Performed by: FAMILY MEDICINE

## 2019-09-13 PROCEDURE — G8427 DOCREV CUR MEDS BY ELIG CLIN: HCPCS | Performed by: FAMILY MEDICINE

## 2019-09-13 PROCEDURE — 4004F PT TOBACCO SCREEN RCVD TLK: CPT | Performed by: FAMILY MEDICINE

## 2019-09-13 PROCEDURE — 3017F COLORECTAL CA SCREEN DOC REV: CPT | Performed by: FAMILY MEDICINE

## 2019-09-13 PROCEDURE — 99214 OFFICE O/P EST MOD 30 MIN: CPT | Performed by: FAMILY MEDICINE

## 2019-09-13 RX ORDER — KETOROLAC TROMETHAMINE 30 MG/ML
60 INJECTION, SOLUTION INTRAMUSCULAR; INTRAVENOUS ONCE
Status: COMPLETED | OUTPATIENT
Start: 2019-09-13 | End: 2019-09-13

## 2019-09-13 RX ORDER — ACETAMINOPHEN AND CODEINE PHOSPHATE 300; 30 MG/1; MG/1
1 TABLET ORAL EVERY 6 HOURS PRN
Qty: 20 TABLET | Refills: 0 | Status: SHIPPED | OUTPATIENT
Start: 2019-09-13 | End: 2019-09-18

## 2019-09-13 RX ADMIN — KETOROLAC TROMETHAMINE 60 MG: 30 INJECTION, SOLUTION INTRAMUSCULAR; INTRAVENOUS at 15:21

## 2019-09-13 NOTE — LETTER
[facility] Primary Care  [facility] [address]  [facility] [address]  Phone: [phone]  Fax: [phone]  [place] DO Cherrie        September 13, 2019     Patient: Emily Blanco   YOB: 1965   Date of Visit: 9/13/2019       To Whom it May Concern:    Kami Sharpe was seen in my clinic on 9/13/2019. Please excuse her from work 9/13/19- 9/15/19. She may return 9/16/19. If you have any questions or concerns, please don't hesitate to call.     Sincerely,         [place], DO

## 2019-09-13 NOTE — PROGRESS NOTES
[address] PRIMARY CARE  [facility] [address]  [address] [address]  Dept: [phone]  Dept Fax: [phone]    Ledy Jaeger is a 48 y.o. female who presents today for her medical conditions/complaints as noted below. Ledy Jaeger is c/o of  Chief Complaint   Patient presents with   Bus[place] Other     possible bluging disc, nerves are sensitive, started this morning and getting worse    Nausea     gagging but no vomiting         HPI:     HPI     Pt is a 47 y/o female here due to low back pain, mostly on left side. States woke up this morning with it and has been nauseous and breaking into sweat and feeling very tired. Denies any saddle anesthesia or bowel/bladder incontinence. Tried tylenol which did not help. Left work early today due to pain. Feels tingling/numbness on sides of both thighs, R >L. Denies any falls, injury, weakness of LE.      Hemoglobin A1C (%)   Date Value   10/18/2017 5.4             ( goal A1C is < 7)   No results found for: LABMICR  LDL Cholesterol (mg/dL)   Date Value   06/20/2019 206 (H)   05/31/2018        10/18/2017 167 (H)       (goal LDL is <100)   AST (U/L)   Date Value   06/20/2019 19     ALT (U/L)   Date Value   06/20/2019 24     BUN (mg/dL)   Date Value   06/20/2019 17     BP Readings from Last 3 Encounters:   09/13/19 (!) 138/90   08/29/19 134/84   08/20/19 132/74          (goal 120/80)    Past Medical History:   Diagnosis Date    Anxiety     Chronic back pain     Hypertension     Mass of adrenal gland ([address]     Obesity     Solitary left kidney     Transient gluten sensitivity 02/21/2019    WPW (Emiliana-Parkinson-White syndrome) 2009      Past Surgical History:   Procedure Laterality Date    ABLATION OF DYSRHYTHMIC FOCUS      x 2-3 due to WPW    APPENDECTOMY      ARM SURGERY      COLONOSCOPY N/A 2/21/2019    COLONOSCOPY POLYPECTOMY SNARE/COLD BIOPSY performed by Gregg Mckeon MD at [address] [place] [address] GASTROINTESTINAL ENDOSCOPY N/A 2/21/2019    EGD BIOPSY performed by Pérez Gan MD at [address] History   Problem Relation Age of Onset    Stroke Mother     Cancer Mother         bladder    Diabetes Father     Diabetes Sister     Ovarian Cancer Sister        Social History     Tobacco Use    Smoking status: Current Every Day Smoker     Packs/day: 0.10     Types: Cigarettes    Smokeless tobacco: Never Used    Tobacco comment: decreasing iwth wellbutrin, currently about 3 cigarettes per day   Substance Use Topics    Alcohol use: No      Current Outpatient Medications   Medication Sig Dispense Refill    acetaminophen-codeine (TYLENOL/CODEINE #3) 300-30 MG per tablet Take 1 tablet by mouth every 6 hours as needed for Pain for up to 5 days. Take lowest dose possible to manage pain 20 tablet 0    amitriptyline (ELAVIL) 50 MG tablet Take 1 tablet by mouth nightly 30 tablet 0    atorvastatin (LIPITOR) 20 MG tablet Take 1 tablet by mouth daily 30 tablet 5    clotrimazole-betamethasone (LOTRISONE) 1-0.05 % cream Apply externally bid x 3 days then daily x 4 days. 1 Tube 0    buPROPion (WELLBUTRIN SR) 200 MG extended release tablet Take 1 tablet by mouth 2 times daily 60 tablet 5     No current facility-administered medications for this visit.       Allergies   Allergen Reactions    Gluten Meal      Abdominal pain    Pcn [Penicillins] Hives       Health Maintenance   Topic Date Due    HIV screen  03/31/1982    Shingles Vaccine (1 of 2) 03/31/2017    Flu vaccine (1) 03/21/2020 (Originally 9/1/2019)    Pneumococcal 0-64 years Vaccine (1 of 1 - PPSV23) 06/11/2020 (Originally 3/31/1973)    DTaP/Tdap/Td vaccine (1 - Tdap) 08/20/2020 (Originally 3/31/1986)    TSH testing  06/20/2020    Potassium monitoring  06/20/2020    Creatinine monitoring  06/20/2020    Breast cancer screen  05/09/2021    Lipid screen  06/20/2024    Cervical cancer screen  06/27/2024    Colon cancer screen colonoscopy 02/21/2029       Subjective:      Review of Systems   Constitutional: Negative for chills and fever. Respiratory: Negative for shortness of breath. Gastrointestinal: Positive for nausea. Negative for abdominal pain and vomiting. Genitourinary: Negative for dysuria. Musculoskeletal: Positive for back pain. No bowel or bladder incontinence, or saddle anesthesia   Neurological: Positive for numbness. Negative for weakness. Objective:     Physical Exam   Constitutional: She is oriented to person, place, and time. She appears well-developed and well-nourished. No distress. HENT:   Head: Normocephalic and atraumatic. Mouth/Throat: Oropharynx is clear and moist.   Eyes: Pupils are equal, round, and reactive to light. Conjunctivae are normal.   Neck: Normal range of motion. Neck supple. Cardiovascular: Normal rate, regular rhythm and normal heart sounds. Exam reveals no friction rub. No murmur heard. Pulmonary/Chest: Effort normal and breath sounds normal. No stridor. No respiratory distress. Musculoskeletal: Normal range of motion. ttp of left lower back. BL legs R>L some tingling/numbness . 5/5 LE strength   Neurological: She is alert and oriented to person, place, and time. She has normal reflexes. She displays normal reflexes. Skin: Skin is warm and dry. She is not diaphoretic. Psychiatric: She has a normal mood and affect. Her behavior is normal. Thought content normal.     BP (!) 138/90 (Site: Left Upper Arm, Position: Sitting, Cuff Size: Large Adult)   Pulse 93   Resp 18   Ht 5' 2\" (1.575 m)   Wt 220 lb 14.4 oz (100.2 kg)   SpO2 97%   Breastfeeding? No   BMI 40.40 kg/m²     Assessment:      1. Left-sided low back pain with bilateral sciatica, unspecified chronicity  - recommend taking flexeril for back and take tylenol 3 only when needed. Toradol injection given today as well. If pain severe/worsening to go to ED. Will check XRAY, letter for work given as well.   - XR

## 2019-09-14 ASSESSMENT — ENCOUNTER SYMPTOMS
SHORTNESS OF BREATH: 0
NAUSEA: 1
BACK PAIN: 1
ABDOMINAL PAIN: 0
VOMITING: 0

## 2019-09-25 ENCOUNTER — OFFICE VISIT (OUTPATIENT)
Dept: PRIMARY CARE CLINIC | Age: 54
End: 2019-09-25
Payer: COMMERCIAL

## 2019-09-25 VITALS
WEIGHT: 229 LBS | DIASTOLIC BLOOD PRESSURE: 80 MMHG | SYSTOLIC BLOOD PRESSURE: 136 MMHG | HEIGHT: 62 IN | BODY MASS INDEX: 42.14 KG/M2

## 2019-09-25 DIAGNOSIS — M54.42 LEFT-SIDED LOW BACK PAIN WITH BILATERAL SCIATICA, UNSPECIFIED CHRONICITY: Primary | ICD-10-CM

## 2019-09-25 DIAGNOSIS — M54.41 LEFT-SIDED LOW BACK PAIN WITH BILATERAL SCIATICA, UNSPECIFIED CHRONICITY: Primary | ICD-10-CM

## 2019-09-25 DIAGNOSIS — M54.9 MID BACK PAIN: ICD-10-CM

## 2019-09-25 DIAGNOSIS — E66.9 OBESITY (BMI 35.0-39.9 WITHOUT COMORBIDITY): ICD-10-CM

## 2019-09-25 DIAGNOSIS — L30.9 DERMATITIS: ICD-10-CM

## 2019-09-25 PROCEDURE — G8427 DOCREV CUR MEDS BY ELIG CLIN: HCPCS | Performed by: NURSE PRACTITIONER

## 2019-09-25 PROCEDURE — 99214 OFFICE O/P EST MOD 30 MIN: CPT | Performed by: NURSE PRACTITIONER

## 2019-09-25 PROCEDURE — 4004F PT TOBACCO SCREEN RCVD TLK: CPT | Performed by: NURSE PRACTITIONER

## 2019-09-25 PROCEDURE — G8417 CALC BMI ABV UP PARAM F/U: HCPCS | Performed by: NURSE PRACTITIONER

## 2019-09-25 PROCEDURE — 3017F COLORECTAL CA SCREEN DOC REV: CPT | Performed by: NURSE PRACTITIONER

## 2019-09-25 RX ORDER — PHENTERMINE HYDROCHLORIDE 37.5 MG/1
37.5 TABLET ORAL
Qty: 30 TABLET | Refills: 0 | Status: SHIPPED | OUTPATIENT
Start: 2019-09-25 | End: 2019-10-25

## 2019-09-25 RX ORDER — PHENTERMINE HYDROCHLORIDE 37.5 MG/1
37.5 TABLET ORAL
Qty: 30 TABLET | Refills: 0 | Status: CANCELLED | OUTPATIENT
Start: 2019-09-25 | End: 2019-10-25

## 2019-09-25 ASSESSMENT — PATIENT HEALTH QUESTIONNAIRE - PHQ9
SUM OF ALL RESPONSES TO PHQ9 QUESTIONS 1 & 2: 0
SUM OF ALL RESPONSES TO PHQ QUESTIONS 1-9: 0
2. FEELING DOWN, DEPRESSED OR HOPELESS: 0
1. LITTLE INTEREST OR PLEASURE IN DOING THINGS: 0
SUM OF ALL RESPONSES TO PHQ QUESTIONS 1-9: 0

## 2019-09-25 ASSESSMENT — ENCOUNTER SYMPTOMS
SHORTNESS OF BREATH: 0
COUGH: 0
ABDOMINAL PAIN: 0
BACK PAIN: 1

## 2019-09-25 NOTE — PROGRESS NOTES
[facility] CARE  [facility] [address]  [address] [address]  Dept: [phone]  Dept Fax: [phone]    Stevo Robertson is a 48 y.o. female who presentstoday for her medical conditions/complaints as noted below.   Stevo Robertson is c/o of  Chief Complaint   Patient presents with    Back Pain    Depression    Weight Gain           HPI:     Presents for recheck on back pain    Was seen earlier for left sided lower back pain and given order for xray  Did not have xray completed d/t work schedule  This pain has improved but is still present    C/o mid back pain, along bra line  Unable to sit comfortably during exam  States if she has orders for testing she will have this completed tomorrow  Using flexeril with some improvement in pain, but it makes her drowsy    Stopped antidepressants  Was unable to tolerate elavil d/t grogginess  Gained weight d/t stress eating  Resumed wellbutrin    Feels that weight is contributing to back pain  Would like to discuss weight loss options    No improvement to left upper arm lesion with use of hydrocortisone  Would like referral to derm      Hemoglobin A1C (%)   Date Value   10/18/2017 5.4             ( goal A1C is < 7)   No results found for: LABMICR  LDL Cholesterol (mg/dL)   Date Value   06/20/2019 206 (H)   05/31/2018        10/18/2017 167 (H)       (goal LDL is <100)   AST (U/L)   Date Value   06/20/2019 19     ALT (U/L)   Date Value   06/20/2019 24     BUN (mg/dL)   Date Value   06/20/2019 17     BP Readings from Last 3 Encounters:   09/25/19 136/80   09/13/19 (!) 138/90   08/29/19 134/84          (mbbi332/80)    Past Medical History:   Diagnosis Date    Anxiety     Chronic back pain     Hypertension     Mass of adrenal gland ([address]     Obesity     Solitary left kidney     Transient gluten sensitivity 02/21/2019    WPW (Emiliana-Parkinson-White syndrome) 2009      Past Surgical History:   Procedure Laterality Review of Systems   Constitutional: Positive for fatigue. Negative for chills and fever. HENT: Negative for congestion. Eyes: Negative for visual disturbance. Respiratory: Negative for cough and shortness of breath. Cardiovascular: Negative for chest pain and palpitations. Gastrointestinal: Negative for abdominal pain. Genitourinary: Negative for dysuria. Musculoskeletal: Positive for back pain. Neurological: Positive for dizziness and headaches. Negative for numbness. Psychiatric/Behavioral: Negative for self-injury, sleep disturbance and suicidal ideas. The patient is not nervous/anxious. Objective:     Physical Exam   Constitutional: She is oriented to person, place, and time. She appears well-developed and well-nourished. HENT:   Head: Normocephalic and atraumatic. Eyes: Pupils are equal, round, and reactive to light. Neck: Normal range of motion. Neck supple. Cardiovascular: Normal rate, regular rhythm and normal heart sounds. Pulmonary/Chest: Effort normal and breath sounds normal.   Abdominal: Soft. Bowel sounds are normal. There is no tenderness. Musculoskeletal: Normal range of motion. Thoracic back: She exhibits pain. Lumbar back: She exhibits pain. Back:    Neurological: She is alert and oriented to person, place, and time. Skin: Skin is warm and dry. Psychiatric: She has a normal mood and affect. Her behavior is normal. Judgment and thought content normal.   Nursing note and vitals reviewed. /80   Ht 5' 2\" (1.575 m)   Wt 229 lb (103.9 kg)   BMI 41.88 kg/m²     Assessment:       Diagnosis Orders   1. Left-sided low back pain with bilateral sciatica, unspecified chronicity  XR LUMBAR SPINE (2-3 VIEWS)   2. Mid back pain  XR THORACIC SPINE (MIN 4 VIEWS)   3. Obesity (BMI 35.0-39.9 without comorbidity)  phentermine (ADIPEX-P) 37.5 MG tablet   4.  Dermatitis  Amb External Referral To Dermatology             Plan:      Return in about 1 month (around 10/25/2019) for adipex. 1. Back pain- Rx given for xray lumbar and thoracic spine, follow up pending results. 2. Dermatitis- Rx given for referral to derm, follow up as needed. 3. Obesity- Rx given for adipex with instruction for use. Follow up in one month for recheck. Of the 25 minute duration appointment visit, Spring DIAZ spent at least 50% of the face-to-face time in counseling, explanation of diagnosis, planning of further management, and answering all questions. Orders Placed This Encounter   Procedures    XR LUMBAR SPINE (2-3 VIEWS)     Standing Status:   Future     Standing Expiration Date:   9/25/2020     Order Specific Question:   Reason for exam:     Answer:   pain    XR THORACIC SPINE (MIN 4 VIEWS)     Standing Status:   Future     Standing Expiration Date:   9/25/2020     Order Specific Question:   Reason for exam:     Answer:   mid back pain    Amb External Referral To Dermatology     Referral Priority:   Routine     Referral Type:   Consult for Advice and Opinion     Referral Reason:   Specialty Services Required     Requested Specialty:   Dermatology     Number of Visits Requested:   1        Orders Placed This Encounter   Medications    phentermine (ADIPEX-P) 37.5 MG tablet     Sig: Take 1 tablet by mouth every morning (before breakfast) for 30 days. Dispense:  30 tablet     Refill:  0       Patient given educational materials - see patient instructions. Discussed use, benefit, and side effects of prescribed medications. All patientquestions answered. Pt voiced understanding. Reviewed health maintenance. Instructedto continue current medications, diet and exercise. Patient agreed with treatmentplan. Follow up as directed.      Electronicallysigned by DK Lizama CNP on 9/25/2019 at 1:15 PM

## 2019-09-30 ENCOUNTER — CARE COORDINATION (OUTPATIENT)
Dept: CARE COORDINATION | Age: 54
End: 2019-09-30

## 2019-10-01 ENCOUNTER — HOSPITAL ENCOUNTER (OUTPATIENT)
Dept: GENERAL RADIOLOGY | Age: 54
Discharge: HOME | End: 2019-10-03
Payer: COMMERCIAL

## 2019-10-01 ENCOUNTER — HOSPITAL ENCOUNTER (OUTPATIENT)
Age: 54
Discharge: HOME | End: 2019-10-03
Payer: COMMERCIAL

## 2019-10-01 DIAGNOSIS — M54.41 LEFT-SIDED LOW BACK PAIN WITH BILATERAL SCIATICA, UNSPECIFIED CHRONICITY: ICD-10-CM

## 2019-10-01 DIAGNOSIS — M54.42 LEFT-SIDED LOW BACK PAIN WITH BILATERAL SCIATICA, UNSPECIFIED CHRONICITY: ICD-10-CM

## 2019-10-01 DIAGNOSIS — M54.9 MID BACK PAIN: ICD-10-CM

## 2019-10-01 PROCEDURE — 72072 X-RAY EXAM THORAC SPINE 3VWS: CPT

## 2019-10-01 PROCEDURE — 72100 X-RAY EXAM L-S SPINE 2/3 VWS: CPT

## 2019-10-02 DIAGNOSIS — M54.9 MID BACK PAIN: ICD-10-CM

## 2019-10-02 DIAGNOSIS — M54.41 LEFT-SIDED LOW BACK PAIN WITH BILATERAL SCIATICA, UNSPECIFIED CHRONICITY: Primary | ICD-10-CM

## 2019-10-02 DIAGNOSIS — M54.42 LEFT-SIDED LOW BACK PAIN WITH BILATERAL SCIATICA, UNSPECIFIED CHRONICITY: Primary | ICD-10-CM

## 2019-10-08 ENCOUNTER — HOSPITAL ENCOUNTER (EMERGENCY)
Age: 54
Discharge: HOME | End: 2019-10-08
Attending: SPECIALIST
Payer: COMMERCIAL

## 2019-10-08 ENCOUNTER — APPOINTMENT (OUTPATIENT)
Dept: GENERAL RADIOLOGY | Age: 54
End: 2019-10-08
Payer: COMMERCIAL

## 2019-10-08 ENCOUNTER — CARE COORDINATION (OUTPATIENT)
Dept: CARE COORDINATION | Age: 54
End: 2019-10-08

## 2019-10-08 VITALS
HEIGHT: 62 IN | SYSTOLIC BLOOD PRESSURE: 136 MMHG | RESPIRATION RATE: 16 BRPM | DIASTOLIC BLOOD PRESSURE: 88 MMHG | HEART RATE: 82 BPM | OXYGEN SATURATION: 98 % | WEIGHT: 220 LBS | TEMPERATURE: 98 F | BODY MASS INDEX: 40.48 KG/M2

## 2019-10-08 DIAGNOSIS — R07.89 CHEST WALL PAIN: Primary | ICD-10-CM

## 2019-10-08 LAB
ABSOLUTE EOS #: 0.2 K/UL (ref 0–0.4)
ABSOLUTE IMMATURE GRANULOCYTE: NORMAL K/UL (ref 0–0.3)
ABSOLUTE LYMPH #: 1.5 K/UL (ref 1–4.8)
ABSOLUTE MONO #: 0.4 K/UL (ref 0.1–1.2)
ALBUMIN SERPL-MCNC: 4.5 G/DL (ref 3.5–5.2)
ALBUMIN/GLOBULIN RATIO: 1.5 (ref 1–2.5)
ALP BLD-CCNC: 76 U/L (ref 35–104)
ALT SERPL-CCNC: 28 U/L (ref 5–33)
ANION GAP SERPL CALCULATED.3IONS-SCNC: 11 MMOL/L (ref 9–17)
AST SERPL-CCNC: 28 U/L
BASOPHILS # BLD: 1 % (ref 0–2)
BASOPHILS ABSOLUTE: 0 K/UL (ref 0–0.2)
BILIRUB SERPL-MCNC: 0.46 MG/DL (ref 0.3–1.2)
BILIRUBIN URINE: NEGATIVE
BUN BLDV-MCNC: 21 MG/DL (ref 6–20)
BUN/CREAT BLD: ABNORMAL (ref 9–20)
CALCIUM SERPL-MCNC: 9.8 MG/DL (ref 8.6–10.4)
CHLORIDE BLD-SCNC: 102 MMOL/L (ref 98–107)
CO2: 28 MMOL/L (ref 20–31)
COLOR: YELLOW
COMMENT UA: NORMAL
CREAT SERPL-MCNC: 1.03 MG/DL (ref 0.5–0.9)
D-DIMER QUANTITATIVE: 0.25 MG/L FEU
DIFFERENTIAL TYPE: NORMAL
EOSINOPHILS RELATIVE PERCENT: 4 % (ref 1–4)
GFR AFRICAN AMERICAN: >60 ML/MIN
GFR NON-AFRICAN AMERICAN: 56 ML/MIN
GFR SERPL CREATININE-BSD FRML MDRD: ABNORMAL ML/MIN/{1.73_M2}
GFR SERPL CREATININE-BSD FRML MDRD: ABNORMAL ML/MIN/{1.73_M2}
GLUCOSE BLD-MCNC: 92 MG/DL (ref 70–99)
GLUCOSE URINE: NEGATIVE
HCT VFR BLD CALC: 42.3 % (ref 36–46)
HEMOGLOBIN: 14.2 G/DL (ref 12–16)
IMMATURE GRANULOCYTES: NORMAL %
KETONES, URINE: NEGATIVE
LEUKOCYTE ESTERASE, URINE: NEGATIVE
LIPASE: 41 U/L (ref 13–60)
LYMPHOCYTES # BLD: 24 % (ref 24–44)
MCH RBC QN AUTO: 29.6 PG (ref 26–34)
MCHC RBC AUTO-ENTMCNC: 33.5 G/DL (ref 31–37)
MCV RBC AUTO: 88.3 FL (ref 80–100)
MONOCYTES # BLD: 7 % (ref 2–11)
NITRITE, URINE: NEGATIVE
NRBC AUTOMATED: NORMAL PER 100 WBC
PDW BLD-RTO: 14.3 % (ref 12.5–15.4)
PH UA: 6
PLATELET # BLD: 208 K/UL (ref 140–450)
PLATELET ESTIMATE: NORMAL
PMV BLD AUTO: 9 FL (ref 6–12)
POTASSIUM SERPL-SCNC: 4 MMOL/L (ref 3.7–5.3)
PROTEIN UA: NEGATIVE
RBC # BLD: 4.79 M/UL (ref 4–5.2)
RBC # BLD: NORMAL 10*6/UL
SEG NEUTROPHILS: 64 % (ref 36–66)
SEGMENTED NEUTROPHILS ABSOLUTE COUNT: 4.1 K/UL (ref 1.8–7.7)
SODIUM BLD-SCNC: 141 MMOL/L (ref 135–144)
SPECIFIC GRAVITY UA: 1.02
TOTAL PROTEIN: 7.6 G/DL (ref 6.4–8.3)
TROPONIN INTERP: NORMAL
TROPONIN T: NORMAL NG/ML
TROPONIN, HIGH SENSITIVITY: <6 NG/L (ref 0–14)
TURBIDITY: CLEAR
URINE HGB: NEGATIVE
UROBILINOGEN, URINE: NORMAL
WBC # BLD: 6.3 K/UL (ref 3.5–11)
WBC # BLD: NORMAL 10*3/UL

## 2019-10-08 PROCEDURE — 85025 COMPLETE CBC W/AUTO DIFF WBC: CPT

## 2019-10-08 PROCEDURE — 81003 URINALYSIS AUTO W/O SCOPE: CPT

## 2019-10-08 PROCEDURE — 84484 ASSAY OF TROPONIN QUANT: CPT

## 2019-10-08 PROCEDURE — 80053 COMPREHEN METABOLIC PANEL: CPT

## 2019-10-08 PROCEDURE — 71046 X-RAY EXAM CHEST 2 VIEWS: CPT

## 2019-10-08 PROCEDURE — 93005 ELECTROCARDIOGRAM TRACING: CPT | Performed by: PHYSICIAN ASSISTANT

## 2019-10-08 PROCEDURE — 85379 FIBRIN DEGRADATION QUANT: CPT

## 2019-10-08 PROCEDURE — 96374 THER/PROPH/DIAG INJ IV PUSH: CPT

## 2019-10-08 PROCEDURE — 36415 COLL VENOUS BLD VENIPUNCTURE: CPT

## 2019-10-08 PROCEDURE — 99285 EMERGENCY DEPT VISIT HI MDM: CPT

## 2019-10-08 PROCEDURE — 6360000002 HC RX W HCPCS: Performed by: PHYSICIAN ASSISTANT

## 2019-10-08 PROCEDURE — 83690 ASSAY OF LIPASE: CPT

## 2019-10-08 PROCEDURE — 6370000000 HC RX 637 (ALT 250 FOR IP): Performed by: PHYSICIAN ASSISTANT

## 2019-10-08 RX ORDER — ASPIRIN 81 MG/1
324 TABLET, CHEWABLE ORAL ONCE
Status: COMPLETED | OUTPATIENT
Start: 2019-10-08 | End: 2019-10-08

## 2019-10-08 RX ORDER — KETOROLAC TROMETHAMINE 15 MG/ML
15 INJECTION, SOLUTION INTRAMUSCULAR; INTRAVENOUS ONCE
Status: COMPLETED | OUTPATIENT
Start: 2019-10-08 | End: 2019-10-08

## 2019-10-08 RX ADMIN — KETOROLAC TROMETHAMINE 15 MG: 15 INJECTION, SOLUTION INTRAMUSCULAR; INTRAVENOUS at 13:13

## 2019-10-08 RX ADMIN — ASPIRIN 81 MG 324 MG: 81 TABLET ORAL at 13:13

## 2019-10-08 ASSESSMENT — PAIN DESCRIPTION - LOCATION: LOCATION: CHEST

## 2019-10-08 ASSESSMENT — PAIN SCALES - GENERAL
PAINLEVEL_OUTOF10: 6
PAINLEVEL_OUTOF10: 6
PAINLEVEL_OUTOF10: 5

## 2019-10-08 ASSESSMENT — PAIN DESCRIPTION - PROGRESSION: CLINICAL_PROGRESSION: GRADUALLY IMPROVING

## 2019-10-08 ASSESSMENT — PAIN DESCRIPTION - PAIN TYPE: TYPE: ACUTE PAIN

## 2019-10-09 ENCOUNTER — HOSPITAL ENCOUNTER (OUTPATIENT)
Dept: PHYSICAL THERAPY | Facility: CLINIC | Age: 54
Setting detail: THERAPIES SERIES
Discharge: HOME | End: 2019-10-09
Payer: COMMERCIAL

## 2019-10-09 LAB
EKG ATRIAL RATE: 98 BPM
EKG P AXIS: 69 DEGREES
EKG P-R INTERVAL: 176 MS
EKG Q-T INTERVAL: 364 MS
EKG QRS DURATION: 94 MS
EKG QTC CALCULATION (BAZETT): 464 MS
EKG R AXIS: 48 DEGREES
EKG T AXIS: 55 DEGREES
EKG VENTRICULAR RATE: 98 BPM

## 2019-10-10 ENCOUNTER — CARE COORDINATION (OUTPATIENT)
Dept: CARE COORDINATION | Age: 54
End: 2019-10-10

## 2019-10-14 ENCOUNTER — HOSPITAL ENCOUNTER (OUTPATIENT)
Dept: PHYSICAL THERAPY | Facility: CLINIC | Age: 54
Setting detail: THERAPIES SERIES
Discharge: HOME | End: 2019-10-14
Payer: COMMERCIAL

## 2019-10-29 ENCOUNTER — CARE COORDINATION (OUTPATIENT)
Dept: CARE COORDINATION | Age: 54
End: 2019-10-29

## 2019-10-31 ENCOUNTER — OFFICE VISIT (OUTPATIENT)
Dept: PRIMARY CARE CLINIC | Age: 54
End: 2019-10-31
Payer: COMMERCIAL

## 2019-10-31 ENCOUNTER — TELEPHONE (OUTPATIENT)
Dept: PRIMARY CARE CLINIC | Age: 54
End: 2019-10-31

## 2019-10-31 VITALS
DIASTOLIC BLOOD PRESSURE: 90 MMHG | HEIGHT: 62 IN | RESPIRATION RATE: 15 BRPM | HEART RATE: 94 BPM | OXYGEN SATURATION: 98 % | WEIGHT: 227 LBS | SYSTOLIC BLOOD PRESSURE: 148 MMHG | BODY MASS INDEX: 41.77 KG/M2

## 2019-10-31 DIAGNOSIS — Z87.898 HISTORY OF PREDIABETES: ICD-10-CM

## 2019-10-31 DIAGNOSIS — R79.9 ELEVATED BUN: ICD-10-CM

## 2019-10-31 DIAGNOSIS — F33.2 SEVERE EPISODE OF RECURRENT MAJOR DEPRESSIVE DISORDER, WITHOUT PSYCHOTIC FEATURES (HCC): Primary | ICD-10-CM

## 2019-10-31 LAB — HBA1C MFR BLD: 5.3 %

## 2019-10-31 PROCEDURE — G8484 FLU IMMUNIZE NO ADMIN: HCPCS | Performed by: NURSE PRACTITIONER

## 2019-10-31 PROCEDURE — 83036 HEMOGLOBIN GLYCOSYLATED A1C: CPT | Performed by: NURSE PRACTITIONER

## 2019-10-31 PROCEDURE — G8427 DOCREV CUR MEDS BY ELIG CLIN: HCPCS | Performed by: NURSE PRACTITIONER

## 2019-10-31 PROCEDURE — 3017F COLORECTAL CA SCREEN DOC REV: CPT | Performed by: NURSE PRACTITIONER

## 2019-10-31 PROCEDURE — G8417 CALC BMI ABV UP PARAM F/U: HCPCS | Performed by: NURSE PRACTITIONER

## 2019-10-31 PROCEDURE — 99215 OFFICE O/P EST HI 40 MIN: CPT | Performed by: NURSE PRACTITIONER

## 2019-10-31 PROCEDURE — 4004F PT TOBACCO SCREEN RCVD TLK: CPT | Performed by: NURSE PRACTITIONER

## 2019-10-31 RX ORDER — SERTRALINE HYDROCHLORIDE 25 MG/1
25 TABLET, FILM COATED ORAL DAILY
Qty: 30 TABLET | Refills: 3 | Status: SHIPPED | OUTPATIENT
Start: 2019-10-31 | End: 2019-11-06

## 2019-10-31 ASSESSMENT — ENCOUNTER SYMPTOMS
SHORTNESS OF BREATH: 0
COUGH: 0
ABDOMINAL PAIN: 1
BACK PAIN: 1

## 2019-11-06 ENCOUNTER — APPOINTMENT (OUTPATIENT)
Dept: CT IMAGING | Age: 54
End: 2019-11-06
Payer: COMMERCIAL

## 2019-11-06 ENCOUNTER — HOSPITAL ENCOUNTER (EMERGENCY)
Age: 54
Discharge: HOME | End: 2019-11-06
Attending: EMERGENCY MEDICINE
Payer: COMMERCIAL

## 2019-11-06 ENCOUNTER — APPOINTMENT (OUTPATIENT)
Dept: GENERAL RADIOLOGY | Age: 54
End: 2019-11-06
Payer: COMMERCIAL

## 2019-11-06 VITALS
BODY MASS INDEX: 41.77 KG/M2 | HEIGHT: 62 IN | WEIGHT: 227 LBS | OXYGEN SATURATION: 97 % | TEMPERATURE: 98.4 F | SYSTOLIC BLOOD PRESSURE: 141 MMHG | RESPIRATION RATE: 15 BRPM | HEART RATE: 90 BPM | DIASTOLIC BLOOD PRESSURE: 80 MMHG

## 2019-11-06 DIAGNOSIS — R51.9 NONINTRACTABLE HEADACHE, UNSPECIFIED CHRONICITY PATTERN, UNSPECIFIED HEADACHE TYPE: Primary | ICD-10-CM

## 2019-11-06 LAB
ABSOLUTE EOS #: 0.2 K/UL (ref 0–0.4)
ABSOLUTE IMMATURE GRANULOCYTE: NORMAL K/UL (ref 0–0.3)
ABSOLUTE LYMPH #: 1.8 K/UL (ref 1–4.8)
ABSOLUTE MONO #: 0.5 K/UL (ref 0.1–1.2)
ANION GAP SERPL CALCULATED.3IONS-SCNC: 14 MMOL/L (ref 9–17)
BASOPHILS # BLD: 1 % (ref 0–2)
BASOPHILS ABSOLUTE: 0.1 K/UL (ref 0–0.2)
BUN BLDV-MCNC: 21 MG/DL (ref 6–20)
BUN/CREAT BLD: ABNORMAL (ref 9–20)
CALCIUM SERPL-MCNC: 9.6 MG/DL (ref 8.6–10.4)
CHLORIDE BLD-SCNC: 100 MMOL/L (ref 98–107)
CO2: 29 MMOL/L (ref 20–31)
CREAT SERPL-MCNC: 0.74 MG/DL (ref 0.5–0.9)
DIFFERENTIAL TYPE: NORMAL
EOSINOPHILS RELATIVE PERCENT: 4 % (ref 1–4)
GFR AFRICAN AMERICAN: >60 ML/MIN
GFR NON-AFRICAN AMERICAN: >60 ML/MIN
GFR SERPL CREATININE-BSD FRML MDRD: ABNORMAL ML/MIN/{1.73_M2}
GFR SERPL CREATININE-BSD FRML MDRD: ABNORMAL ML/MIN/{1.73_M2}
GLUCOSE BLD-MCNC: 114 MG/DL (ref 70–99)
HCT VFR BLD CALC: 41 % (ref 36–46)
HEMOGLOBIN: 13.7 G/DL (ref 12–16)
IMMATURE GRANULOCYTES: NORMAL %
LACTIC ACID: 1.6 MMOL/L (ref 0.5–2.2)
LYMPHOCYTES # BLD: 30 % (ref 24–44)
MCH RBC QN AUTO: 29.5 PG (ref 26–34)
MCHC RBC AUTO-ENTMCNC: 33.5 G/DL (ref 31–37)
MCV RBC AUTO: 88 FL (ref 80–100)
MONOCYTES # BLD: 8 % (ref 2–11)
NRBC AUTOMATED: NORMAL PER 100 WBC
PDW BLD-RTO: 14.2 % (ref 12.5–15.4)
PLATELET # BLD: 229 K/UL (ref 140–450)
PLATELET ESTIMATE: NORMAL
PMV BLD AUTO: 8.8 FL (ref 6–12)
POTASSIUM SERPL-SCNC: 3.8 MMOL/L (ref 3.7–5.3)
RBC # BLD: 4.66 M/UL (ref 4–5.2)
RBC # BLD: NORMAL 10*6/UL
SEG NEUTROPHILS: 57 % (ref 36–66)
SEGMENTED NEUTROPHILS ABSOLUTE COUNT: 3.5 K/UL (ref 1.8–7.7)
SODIUM BLD-SCNC: 143 MMOL/L (ref 135–144)
TROPONIN INTERP: NORMAL
TROPONIN T: NORMAL NG/ML
TROPONIN, HIGH SENSITIVITY: 6 NG/L (ref 0–14)
WBC # BLD: 6.1 K/UL (ref 3.5–11)
WBC # BLD: NORMAL 10*3/UL

## 2019-11-06 PROCEDURE — 83605 ASSAY OF LACTIC ACID: CPT

## 2019-11-06 PROCEDURE — 93005 ELECTROCARDIOGRAM TRACING: CPT | Performed by: PHYSICIAN ASSISTANT

## 2019-11-06 PROCEDURE — 2580000003 HC RX 258: Performed by: PHYSICIAN ASSISTANT

## 2019-11-06 PROCEDURE — 80048 BASIC METABOLIC PNL TOTAL CA: CPT

## 2019-11-06 PROCEDURE — 70450 CT HEAD/BRAIN W/O DYE: CPT

## 2019-11-06 PROCEDURE — 36415 COLL VENOUS BLD VENIPUNCTURE: CPT

## 2019-11-06 PROCEDURE — 71045 X-RAY EXAM CHEST 1 VIEW: CPT

## 2019-11-06 PROCEDURE — 84484 ASSAY OF TROPONIN QUANT: CPT

## 2019-11-06 PROCEDURE — 85025 COMPLETE CBC W/AUTO DIFF WBC: CPT

## 2019-11-06 PROCEDURE — 6360000002 HC RX W HCPCS: Performed by: PHYSICIAN ASSISTANT

## 2019-11-06 PROCEDURE — 6370000000 HC RX 637 (ALT 250 FOR IP): Performed by: PHYSICIAN ASSISTANT

## 2019-11-06 PROCEDURE — 96375 TX/PRO/DX INJ NEW DRUG ADDON: CPT

## 2019-11-06 PROCEDURE — 6360000002 HC RX W HCPCS: Performed by: EMERGENCY MEDICINE

## 2019-11-06 PROCEDURE — 96365 THER/PROPH/DIAG IV INF INIT: CPT

## 2019-11-06 PROCEDURE — 99284 EMERGENCY DEPT VISIT MOD MDM: CPT

## 2019-11-06 RX ORDER — ONDANSETRON 2 MG/ML
4 INJECTION INTRAMUSCULAR; INTRAVENOUS ONCE
Status: COMPLETED | OUTPATIENT
Start: 2019-11-06 | End: 2019-11-06

## 2019-11-06 RX ORDER — ACETAMINOPHEN 500 MG
1000 TABLET ORAL ONCE
Status: COMPLETED | OUTPATIENT
Start: 2019-11-06 | End: 2019-11-06

## 2019-11-06 RX ORDER — MAGNESIUM SULFATE 1 G/100ML
1 INJECTION INTRAVENOUS ONCE
Status: COMPLETED | OUTPATIENT
Start: 2019-11-06 | End: 2019-11-06

## 2019-11-06 RX ORDER — 0.9 % SODIUM CHLORIDE 0.9 %
1000 INTRAVENOUS SOLUTION INTRAVENOUS ONCE
Status: COMPLETED | OUTPATIENT
Start: 2019-11-06 | End: 2019-11-06

## 2019-11-06 RX ORDER — DIPHENHYDRAMINE HYDROCHLORIDE 50 MG/ML
50 INJECTION INTRAMUSCULAR; INTRAVENOUS ONCE
Status: COMPLETED | OUTPATIENT
Start: 2019-11-06 | End: 2019-11-06

## 2019-11-06 RX ADMIN — DIPHENHYDRAMINE HYDROCHLORIDE 50 MG: 50 INJECTION, SOLUTION INTRAMUSCULAR; INTRAVENOUS at 20:46

## 2019-11-06 RX ADMIN — SODIUM CHLORIDE 1000 ML: 9 INJECTION, SOLUTION INTRAVENOUS at 20:46

## 2019-11-06 RX ADMIN — ACETAMINOPHEN 1000 MG: 500 TABLET ORAL at 20:14

## 2019-11-06 RX ADMIN — ONDANSETRON 4 MG: 2 INJECTION INTRAMUSCULAR; INTRAVENOUS at 20:14

## 2019-11-06 RX ADMIN — MAGNESIUM SULFATE HEPTAHYDRATE 1 G: 1 INJECTION, SOLUTION INTRAVENOUS at 21:50

## 2019-11-06 ASSESSMENT — PAIN SCALES - GENERAL
PAINLEVEL_OUTOF10: 7
PAINLEVEL_OUTOF10: 6
PAINLEVEL_OUTOF10: 7
PAINLEVEL_OUTOF10: 7

## 2019-11-06 ASSESSMENT — PAIN DESCRIPTION - PAIN TYPE: TYPE: ACUTE PAIN

## 2019-11-06 ASSESSMENT — PAIN DESCRIPTION - LOCATION: LOCATION: HEAD

## 2019-11-07 ENCOUNTER — CARE COORDINATION (OUTPATIENT)
Dept: CARE COORDINATION | Age: 54
End: 2019-11-07

## 2019-11-07 LAB
EKG ATRIAL RATE: 102 BPM
EKG P AXIS: 60 DEGREES
EKG P-R INTERVAL: 154 MS
EKG Q-T INTERVAL: 382 MS
EKG QRS DURATION: 86 MS
EKG QTC CALCULATION (BAZETT): 497 MS
EKG R AXIS: 45 DEGREES
EKG T AXIS: 48 DEGREES
EKG VENTRICULAR RATE: 102 BPM

## 2019-11-08 ASSESSMENT — ENCOUNTER SYMPTOMS
EYE REDNESS: 0
VOMITING: 0
SHORTNESS OF BREATH: 0
NAUSEA: 1
BACK PAIN: 0
COUGH: 1
EYE DISCHARGE: 0

## 2019-11-14 ENCOUNTER — TELEPHONE (OUTPATIENT)
Dept: GASTROENTEROLOGY | Age: 54
End: 2019-11-14

## 2019-11-15 ENCOUNTER — OFFICE VISIT (OUTPATIENT)
Dept: FAMILY MEDICINE CLINIC | Age: 54
End: 2019-11-15
Payer: COMMERCIAL

## 2019-11-15 VITALS
TEMPERATURE: 98.3 F | SYSTOLIC BLOOD PRESSURE: 163 MMHG | WEIGHT: 233 LBS | BODY MASS INDEX: 42.62 KG/M2 | HEART RATE: 88 BPM | DIASTOLIC BLOOD PRESSURE: 92 MMHG

## 2019-11-15 DIAGNOSIS — K08.89 PAIN, DENTAL: Primary | ICD-10-CM

## 2019-11-15 PROCEDURE — G8484 FLU IMMUNIZE NO ADMIN: HCPCS | Performed by: PHYSICIAN ASSISTANT

## 2019-11-15 PROCEDURE — G8427 DOCREV CUR MEDS BY ELIG CLIN: HCPCS | Performed by: PHYSICIAN ASSISTANT

## 2019-11-15 PROCEDURE — G8417 CALC BMI ABV UP PARAM F/U: HCPCS | Performed by: PHYSICIAN ASSISTANT

## 2019-11-15 PROCEDURE — 3017F COLORECTAL CA SCREEN DOC REV: CPT | Performed by: PHYSICIAN ASSISTANT

## 2019-11-15 PROCEDURE — 4004F PT TOBACCO SCREEN RCVD TLK: CPT | Performed by: PHYSICIAN ASSISTANT

## 2019-11-15 PROCEDURE — 99213 OFFICE O/P EST LOW 20 MIN: CPT | Performed by: PHYSICIAN ASSISTANT

## 2019-11-15 RX ORDER — CLINDAMYCIN HYDROCHLORIDE 300 MG/1
300 CAPSULE ORAL 2 TIMES DAILY
Qty: 14 CAPSULE | Refills: 0 | Status: SHIPPED | OUTPATIENT
Start: 2019-11-15 | End: 2019-11-22

## 2019-11-18 ASSESSMENT — ENCOUNTER SYMPTOMS
FACIAL SWELLING: 0
RHINORRHEA: 0
RESPIRATORY NEGATIVE: 1
GASTROINTESTINAL NEGATIVE: 1
EYES NEGATIVE: 1
SINUS PRESSURE: 0
SINUS PAIN: 0
SORE THROAT: 0

## 2019-11-25 ENCOUNTER — TELEPHONE (OUTPATIENT)
Dept: PRIMARY CARE CLINIC | Age: 54
End: 2019-11-25

## 2019-12-02 ENCOUNTER — CARE COORDINATION (OUTPATIENT)
Dept: CARE COORDINATION | Age: 54
End: 2019-12-02

## 2019-12-20 ENCOUNTER — CARE COORDINATION (OUTPATIENT)
Dept: CARE COORDINATION | Age: 54
End: 2019-12-20

## 2019-12-26 ENCOUNTER — OFFICE VISIT (OUTPATIENT)
Dept: PRIMARY CARE CLINIC | Age: 54
End: 2019-12-26
Payer: COMMERCIAL

## 2019-12-26 VITALS
OXYGEN SATURATION: 98 % | HEIGHT: 62 IN | DIASTOLIC BLOOD PRESSURE: 84 MMHG | BODY MASS INDEX: 42.88 KG/M2 | WEIGHT: 233 LBS | HEART RATE: 86 BPM | SYSTOLIC BLOOD PRESSURE: 132 MMHG

## 2019-12-26 DIAGNOSIS — R22.1 LOCALIZED SWELLING, MASS OR LUMP OF NECK: Primary | ICD-10-CM

## 2019-12-26 PROCEDURE — G8417 CALC BMI ABV UP PARAM F/U: HCPCS | Performed by: INTERNAL MEDICINE

## 2019-12-26 PROCEDURE — G8427 DOCREV CUR MEDS BY ELIG CLIN: HCPCS | Performed by: INTERNAL MEDICINE

## 2019-12-26 PROCEDURE — G8484 FLU IMMUNIZE NO ADMIN: HCPCS | Performed by: INTERNAL MEDICINE

## 2019-12-26 PROCEDURE — 3017F COLORECTAL CA SCREEN DOC REV: CPT | Performed by: INTERNAL MEDICINE

## 2019-12-26 PROCEDURE — 4004F PT TOBACCO SCREEN RCVD TLK: CPT | Performed by: INTERNAL MEDICINE

## 2019-12-26 PROCEDURE — 99213 OFFICE O/P EST LOW 20 MIN: CPT | Performed by: INTERNAL MEDICINE

## 2019-12-26 RX ORDER — BUPROPION HYDROCHLORIDE 200 MG/1
200 TABLET, EXTENDED RELEASE ORAL 2 TIMES DAILY
Status: ON HOLD | COMMUNITY
End: 2021-03-05

## 2019-12-26 ASSESSMENT — ENCOUNTER SYMPTOMS
WHEEZING: 0
NAUSEA: 0
TROUBLE SWALLOWING: 0
COUGH: 0
CONSTIPATION: 0
VOMITING: 0
ABDOMINAL PAIN: 0
SINUS PRESSURE: 0
SINUS PAIN: 0
DIARRHEA: 0
VOICE CHANGE: 0
SHORTNESS OF BREATH: 0
SORE THROAT: 0
ABDOMINAL DISTENTION: 0
BACK PAIN: 0

## 2019-12-27 ENCOUNTER — HOSPITAL ENCOUNTER (OUTPATIENT)
Dept: ULTRASOUND IMAGING | Age: 54
Discharge: HOME | End: 2019-12-29
Payer: COMMERCIAL

## 2019-12-27 DIAGNOSIS — R22.1 LOCALIZED SWELLING, MASS OR LUMP OF NECK: ICD-10-CM

## 2019-12-27 PROCEDURE — 76536 US EXAM OF HEAD AND NECK: CPT

## 2020-01-13 ENCOUNTER — CARE COORDINATION (OUTPATIENT)
Dept: CARE COORDINATION | Age: 55
End: 2020-01-13

## 2020-01-20 ENCOUNTER — CARE COORDINATION (OUTPATIENT)
Dept: CARE COORDINATION | Age: 55
End: 2020-01-20

## 2020-01-30 ENCOUNTER — CARE COORDINATION (OUTPATIENT)
Dept: CARE COORDINATION | Age: 55
End: 2020-01-30

## 2020-02-13 ENCOUNTER — CARE COORDINATION (OUTPATIENT)
Dept: CARE COORDINATION | Age: 55
End: 2020-02-13

## 2020-02-13 NOTE — CARE COORDINATION
Ambulatory Care Coordination Note  2/13/2020  CM Risk Score: 5  Charlson 10 Year Mortality Risk Score: 10%     ACC: Rambo Estrella RN    Summary Note: Spoke to patient, feeling fine  No longer has health insurance. Has the Good Rx card for prescriptions. Denies any needs from CM  Discussed care coordination graduation, voiced understanding  Encouraged calling providers as needed with questions or concerns   CM plan; cc graduation     General Assessment    Do you have any symptoms that are causing concern?:  No               Care Coordination Interventions    Program Enrollment:  Rising Risk  Referral from Primary Care Provider:  No  Suggested Interventions and Community Resources  Physical Therapy:  Declined (Comment: cancelled appts )         Goals Addressed                 This Visit's Progress     COMPLETED: Conditions and Symptoms   On track     I will schedule office visits, as directed by my provider. I will notify my provider of any barriers to my plan of care. I will notify my provider of any symptoms that indicate a worsening of my condition. Barriers: lack of motivation and stress  Plan for overcoming my barriers: care coordination   Confidence: 8/10  Anticipated Goal Completion Date: 12/30/2019               Prior to Admission medications    Medication Sig Start Date End Date Taking?  Authorizing Provider   buPROPion [facility] SR) 200 MG extended release tablet Take 200 mg by mouth 2 times daily    Historical Provider, MD       Future Appointments   Date Time Provider Tobin Muniz   2/26/2020 10:40 AM DK Jose - CNP [facility] Jason Sweeney

## 2020-03-10 ENCOUNTER — CARE COORDINATION (OUTPATIENT)
Dept: CARE COORDINATION | Age: 55
End: 2020-03-10

## 2020-07-28 ENCOUNTER — APPOINTMENT (OUTPATIENT)
Dept: GENERAL RADIOLOGY | Age: 55
End: 2020-07-28

## 2020-07-28 ENCOUNTER — HOSPITAL ENCOUNTER (EMERGENCY)
Age: 55
Discharge: HOME | End: 2020-07-28
Attending: EMERGENCY MEDICINE

## 2020-07-28 VITALS
BODY MASS INDEX: 44.16 KG/M2 | HEART RATE: 107 BPM | HEIGHT: 62 IN | OXYGEN SATURATION: 98 % | RESPIRATION RATE: 16 BRPM | WEIGHT: 240 LBS | SYSTOLIC BLOOD PRESSURE: 167 MMHG | TEMPERATURE: 98.4 F | DIASTOLIC BLOOD PRESSURE: 121 MMHG

## 2020-07-28 LAB
ABSOLUTE EOS #: 0.2 K/UL (ref 0–0.4)
ABSOLUTE IMMATURE GRANULOCYTE: NORMAL K/UL (ref 0–0.3)
ABSOLUTE LYMPH #: 1.3 K/UL (ref 1–4.8)
ABSOLUTE MONO #: 0.4 K/UL (ref 0.1–1.2)
ANION GAP SERPL CALCULATED.3IONS-SCNC: 12 MMOL/L (ref 9–17)
BASOPHILS # BLD: 1 % (ref 0–2)
BASOPHILS ABSOLUTE: 0 K/UL (ref 0–0.2)
BNP INTERPRETATION: NORMAL
BUN BLDV-MCNC: 11 MG/DL (ref 6–20)
BUN/CREAT BLD: ABNORMAL (ref 9–20)
CALCIUM SERPL-MCNC: 9.3 MG/DL (ref 8.6–10.4)
CHLORIDE BLD-SCNC: 106 MMOL/L (ref 98–107)
CO2: 23 MMOL/L (ref 20–31)
CREAT SERPL-MCNC: 0.79 MG/DL (ref 0.5–0.9)
D-DIMER QUANTITATIVE: 0.36 MG/L FEU
DIFFERENTIAL TYPE: NORMAL
EOSINOPHILS RELATIVE PERCENT: 4 % (ref 1–4)
GFR AFRICAN AMERICAN: >60 ML/MIN
GFR NON-AFRICAN AMERICAN: >60 ML/MIN
GFR SERPL CREATININE-BSD FRML MDRD: ABNORMAL ML/MIN/{1.73_M2}
GFR SERPL CREATININE-BSD FRML MDRD: ABNORMAL ML/MIN/{1.73_M2}
GLUCOSE BLD-MCNC: 105 MG/DL (ref 70–99)
HCT VFR BLD CALC: 44.1 % (ref 36–46)
HEMOGLOBIN: 14.7 G/DL (ref 12–16)
IMMATURE GRANULOCYTES: NORMAL %
LYMPHOCYTES # BLD: 24 % (ref 24–44)
MCH RBC QN AUTO: 29.3 PG (ref 26–34)
MCHC RBC AUTO-ENTMCNC: 33.4 G/DL (ref 31–37)
MCV RBC AUTO: 87.6 FL (ref 80–100)
MONOCYTES # BLD: 7 % (ref 2–11)
NRBC AUTOMATED: NORMAL PER 100 WBC
PDW BLD-RTO: 14.6 % (ref 12.5–15.4)
PLATELET # BLD: 195 K/UL (ref 140–450)
PLATELET ESTIMATE: NORMAL
PMV BLD AUTO: 9.6 FL (ref 6–12)
POTASSIUM SERPL-SCNC: 3.6 MMOL/L (ref 3.7–5.3)
PRO-BNP: 44 PG/ML
RBC # BLD: 5.03 M/UL (ref 4–5.2)
RBC # BLD: NORMAL 10*6/UL
SEG NEUTROPHILS: 64 % (ref 36–66)
SEGMENTED NEUTROPHILS ABSOLUTE COUNT: 3.6 K/UL (ref 1.8–7.7)
SODIUM BLD-SCNC: 141 MMOL/L (ref 135–144)
TROPONIN INTERP: NORMAL
TROPONIN T: NORMAL NG/ML
TROPONIN, HIGH SENSITIVITY: <6 NG/L (ref 0–14)
WBC # BLD: 5.5 K/UL (ref 3.5–11)
WBC # BLD: NORMAL 10*3/UL

## 2020-07-28 PROCEDURE — 93005 ELECTROCARDIOGRAM TRACING: CPT | Performed by: EMERGENCY MEDICINE

## 2020-07-28 PROCEDURE — 85379 FIBRIN DEGRADATION QUANT: CPT

## 2020-07-28 PROCEDURE — 84484 ASSAY OF TROPONIN QUANT: CPT

## 2020-07-28 PROCEDURE — 80048 BASIC METABOLIC PNL TOTAL CA: CPT

## 2020-07-28 PROCEDURE — 99284 EMERGENCY DEPT VISIT MOD MDM: CPT

## 2020-07-28 PROCEDURE — 71045 X-RAY EXAM CHEST 1 VIEW: CPT

## 2020-07-28 PROCEDURE — 36415 COLL VENOUS BLD VENIPUNCTURE: CPT

## 2020-07-28 PROCEDURE — 83880 ASSAY OF NATRIURETIC PEPTIDE: CPT

## 2020-07-28 PROCEDURE — 2580000003 HC RX 258: Performed by: EMERGENCY MEDICINE

## 2020-07-28 PROCEDURE — 85025 COMPLETE CBC W/AUTO DIFF WBC: CPT

## 2020-07-28 RX ORDER — 0.9 % SODIUM CHLORIDE 0.9 %
1000 INTRAVENOUS SOLUTION INTRAVENOUS ONCE
Status: COMPLETED | OUTPATIENT
Start: 2020-07-28 | End: 2020-07-28

## 2020-07-28 RX ADMIN — SODIUM CHLORIDE 1000 ML: 9 INJECTION, SOLUTION INTRAVENOUS at 12:50

## 2020-07-28 ASSESSMENT — PAIN DESCRIPTION - PAIN TYPE: TYPE: ACUTE PAIN

## 2020-07-28 ASSESSMENT — ENCOUNTER SYMPTOMS: COUGH: 1

## 2020-07-28 ASSESSMENT — PAIN DESCRIPTION - FREQUENCY: FREQUENCY: CONTINUOUS

## 2020-07-28 ASSESSMENT — PAIN DESCRIPTION - DESCRIPTORS: DESCRIPTORS: HEADACHE

## 2020-07-28 ASSESSMENT — PAIN DESCRIPTION - LOCATION: LOCATION: HEAD

## 2020-07-28 ASSESSMENT — PAIN SCALES - GENERAL: PAINLEVEL_OUTOF10: 2

## 2020-07-28 NOTE — ED PROVIDER NOTES
[id number] [facility]  [address] [facility] [address] [facility] [address]  Phone: [phone]  Fax: [phone]        Pt Name: Johanne Thomas  MRN: [id number]  [place] 1965  Date of evaluation: 20      CHIEF COMPLAINT     Chief Complaint   Patient presents with    Fatigue     x 4 days. \"Just doesn't feel good. \"    Cough    Headache         HISTORY OF PRESENT ILLNESS  (Location/Symptom, Timing/Onset, Context/Setting, Quality, Duration, Modifying Factors, Severity.)    Johanne Thomas is a 50 y.o. female who presents with cough fatigue body aches. The patient states that starting on Friday she started developing a cough that is nonproductive body aches sweating headache fatigue of which nothing makes her symptoms better or worse the cough is nonproductive no known fever or chills no vomiting or diarrhea no unusual swelling of the arms or legs      REVIEW OF SYSTEMS    (2-9 systems for level 4, 10 or more for level 5)     Review of Systems   Constitutional: Positive for fatigue. Respiratory: Positive for cough. Musculoskeletal: Positive for myalgias. Neurological: Positive for headaches. All other systems reviewed and are negative. PAST MEDICAL HISTORY    has a past medical history of Anxiety, Chronic back pain, Hypertension, Mass of adrenal gland ([address] Obesity, Solitary left kidney, Transient gluten sensitivity, and WPW (Emiliana-Parkinson-White syndrome). SURGICAL HISTORY      has a past surgical history that includes Tonsillectomy; Appendectomy; Arm Surgery; ablation of dysrhythmic focus; Upper gastrointestinal endoscopy (N/A, 2019); and Colonoscopy (N/A, 2019). CURRENTMEDICATIONS       Previous Medications    BUPROPION (WELLBUTRIN SR) 200 MG EXTENDED RELEASE TABLET    Take 200 mg by mouth 2 times daily       ALLERGIES     is allergic to gluten meal and pcn [penicillins]. FAMILY HISTORY     She indicated that her mother is .  She indicated that the status of her father is unknown. She indicated that her sister is alive. family history includes Cancer in her mother; Diabetes in her father and sister; Ovarian Cancer in her sister; Stroke in her mother. SOCIAL HISTORY      reports that she has been smoking cigarettes. She has been smoking about 0.10 packs per day. She has never used smokeless tobacco. She reports current drug use. Drug: Marijuana. She reports that she does not drink alcohol. PHYSICAL EXAM    (up to 7 for level 4, 8 or more for level 5)   INITIAL VITALS:  height is 5' 2\" (1.575 m) and weight is 108.9 kg (240 lb). Her oral temperature is 98.4 °F (36.9 °C). Her blood pressure is 167/121 (abnormal) and her pulse is 107. Her respiration is 16 and oxygen saturation is 98%. Physical Exam  Vitals signs and nursing note reviewed. Constitutional:       Comments: Mild distress secondary to HPI   HENT:      Head: Normocephalic and atraumatic. Eyes:      Conjunctiva/sclera: Conjunctivae normal.   Neck:      Musculoskeletal: Normal range of motion and neck supple. No neck rigidity or muscular tenderness. Cardiovascular:      Comments: Tachycardic rate that is regular  Pulmonary:      Comments: Diminished breath sounds without focal rales rhonchi or wheezing  Abdominal:      Palpations: Abdomen is soft. Tenderness: There is no abdominal tenderness. There is no right CVA tenderness, left CVA tenderness or guarding. Musculoskeletal: Normal range of motion. General: No swelling or tenderness. Comments: No calf swelling or tenderness appreciated   Lymphadenopathy:      Cervical: No cervical adenopathy. Skin:     General: Skin is warm and dry. Findings: No rash. Neurological:      General: No focal deficit present. Mental Status: She is alert.          DIFFERENTIAL DIAGNOSIS/ MDM:     We will establish an IV give the patient IV fluids I will check labs a chest x-ray EKG    DIAGNOSTIC RESULTS     EKG: All EKG's are interpreted by the Emergency Department Physician who either signs or Co-signs this chart in the [address] a cardiologist.      Interpreted by Dashawn Garcia MD     Rhythm: normal sinus   Rate: 91  Axis: 35  Ectopy: none  Conduction: Prolonged QT  ST Segments: no acute change  T Waves: no acute change  Q Waves: none    Clinical Impression: normal sinus rhythm with no acute changes/normal EKG. No acute infarction/ischemia noted. RADIOLOGY:  Non-plain film images such as CT, Ultrasound and MRI are read by the radiologist. Plain radiographic images are visualized and the radiologist interpretations are reviewed as follows:         Interpretation per the Radiologist below, if available at the time of this note:    XR CHEST PORTABLE (Final result)   Result time 07/28/20 13:40:36   Final result by Luna Polanco MD (07/28/20 13:40:36)                 Impression:     No acute cardiopulmonary process             Narrative:     EXAMINATION:   ONE XRAY VIEW OF THE CHEST     7/28/2020 1:34 pm     COMPARISON:   November 6, 2019     HISTORY:   ORDERING SYSTEM PROVIDED HISTORY: cough   TECHNOLOGIST PROVIDED HISTORY:   cough   Reason for Exam: cough, weakness, fatigue   Acuity: Acute   Type of Exam: Initial     FINDINGS:   Stable cardiomediastinal silhouette.  No pulmonary venous congestion or   edema.  No focal lung consolidation or infiltrate.  No pleural effusion or   pneumothorax.      Osseous structures are grossly intact.                        LABS:  Results for orders placed or performed during the hospital encounter of 07/28/20   CBC Auto Differential   Result Value Ref Range    WBC 5.5 3.5 - 11.0 k/uL    RBC 5.03 4.0 - 5.2 m/uL    Hemoglobin 14.7 12.0 - 16.0 g/dL    Hematocrit 44.1 36 - 46 %    MCV 87.6 80 - 100 fL    MCH 29.3 26 - 34 pg    MCHC 33.4 31 - 37 g/dL    RDW 14.6 12.5 - 15.4 %    Platelets 383 185 - 865 k/uL    MPV 9.6 6.0 - 12.0 fL    NRBC Automated NOT REPORTED per 100 WBC Differential Type NOT REPORTED     Seg Neutrophils 64 36 - 66 %    Lymphocytes 24 24 - 44 %    Monocytes 7 2 - 11 %    Eosinophils % 4 1 - 4 %    Basophils 1 0 - 2 %    Immature Granulocytes NOT REPORTED 0 %    Segs Absolute 3.60 1.8 - 7.7 k/uL    Absolute Lymph # 1.30 1.0 - 4.8 k/uL    Absolute Mono # 0.40 0.1 - 1.2 k/uL    Absolute Eos # 0.20 0.0 - 0.4 k/uL    Basophils Absolute 0.00 0.0 - 0.2 k/uL    Absolute Immature Granulocyte NOT REPORTED 0.00 - 0.30 k/uL    WBC Morphology NOT REPORTED     RBC Morphology NOT REPORTED     Platelet Estimate NOT REPORTED    Basic Metabolic Panel   Result Value Ref Range    Glucose 105 (H) 70 - 99 mg/dL    BUN 11 6 - 20 mg/dL    CREATININE 0.79 0.50 - 0.90 mg/dL    Bun/Cre Ratio NOT REPORTED 9 - 20    Calcium 9.3 8.6 - 10.4 mg/dL    Sodium 141 135 - 144 mmol/L    Potassium 3.6 (L) 3.7 - 5.3 mmol/L    Chloride 106 98 - 107 mmol/L    CO2 23 20 - 31 mmol/L    Anion Gap 12 9 - 17 mmol/L    GFR Non-African American >60 >60 mL/min    GFR African American >60 >60 mL/min    GFR Comment          GFR Staging NOT REPORTED    Troponin   Result Value Ref Range    Troponin, High Sensitivity <6 0 - 14 ng/L    Troponin T NOT REPORTED <0.03 ng/mL    Troponin Interp NOT REPORTED    D-Dimer, Quantitative   Result Value Ref Range    D-Dimer, Quant 0.36 mg/L FEU   Brain Natriuretic Peptide   Result Value Ref Range    Pro-BNP 44 <300 pg/mL    BNP Interpretation Pro-BNP Reference Range:    EKG 12 Lead   Result Value Ref Range    Ventricular Rate 91 BPM    Atrial Rate 91 BPM    P-R Interval 152 ms    QRS Duration 90 ms    Q-T Interval 394 ms    QTc Calculation (Bazett) 484 ms    P Axis 54 degrees    R Axis 35 degrees    T Axis 40 degrees           EMERGENCY DEPARTMENT COURSE:   Vitals:    Vitals:    07/28/20 1215   BP: (!) 167/121   Pulse: 107   Resp: 16   Temp: 98.4 °F (36.9 °C)   TempSrc: Oral   SpO2: 98%   Weight: 108.9 kg (240 lb)   Height: 5' 2\" (1.575 m)     -------------------------  BP: (!) 167/121, Temp: 98.4 °F (36.9 °C), Pulse: 107, Resp: 16      RE-EVALUATION:  The patient presents with viral type symptoms she is no longer tachycardic she is not hypoxic lab work chest x-ray and EKG are all unremarkable given this I do feel she can follow this up as an outpatient  The patient presents with viral symptoms that may represent COVID-19. However at this time, all vital signs are stable and the patient is not hypoxic or tachypneic. Presenting  symptoms are currently mild and not life, limb or organ threatening. The patient presents with upper respiratory symptoms that are not suggestive in nature of pulmonary embolus, cardiac ischemia, meningitis, epiglottis, airway obstruction or other serious etiology. Given the extremely low risk of these diagnoses further testing and evaluation for these possibilites are not indicated at this time. In my opinion, the patient currently does not have an emergency medical condition as defined by EMTALA. I have stressed to the patient that while their current symptoms may not be severe to them, they are highly contagious and should self quarantine for at least 14 days from the onset of symptoms or 72 hours after resolution of their fever without any antipyretic medications (whichever is shorter). Isolation strategies have been discussed and reinforced to protect their friends and families. I have answered questions asked to the patients satisfaction. They have been instructed to immediately return to the emergency department if symptoms worsen including but not limited to shortness of breath, chest pain, a feeling of passing out,light headed or dizziness, any neurologic symptoms, abdominal pain, or persistent nausea, vomiting and/or diarrhea. I have stressed the importance of close followup with their primary care physicians via telemedicine. The patient has voiced understanding of these instructions and does not offer any further questions or complaints.   The patient understands that at this time there is no evidence for a more malignant underlying process, but the patient also understands that early in the process of an illness or injury, an emergency department workup can be falsely reassuring. Routine discharge counseling was given, and the patient understands that worsening, changing or persistent symptoms should prompt an immediate call or follow up with their primary physician or return to the emergency department. The importance of appropriate follow up was also discussed. I have reviewed the disposition diagnosis with the patient and or their family/guardian. I have answered their questions and given discharge instructions. They voiced understanding of these instructions and did not have any further questions or complaints. PROCEDURES:  None    FINAL IMPRESSION      1. Suspected COVID-19 virus infection          DISPOSITION/PLAN   DISPOSITION        CONDITION ON DISPOSITION:   Stable    PATIENT REFERRED TO:  DK Cunningham - CNP  [address] [address]  [facility]  [phone]    Call in 2 days        DISCHARGE MEDICATIONS:  New Prescriptions    No medications on file       (Please note that portions of this note were completed with a voicerecognition program.  Efforts were made to edit the dictations but occasionally words are mis-transcribed.)    Zuluaga MD, F.A.C.E.P.   Attending Emergency Medicine Physician       Johana Benitez MD  07/28/20 9565

## 2020-07-29 ENCOUNTER — CARE COORDINATION (OUTPATIENT)
Dept: FAMILY MEDICINE CLINIC | Age: 55
End: 2020-07-29

## 2020-07-29 LAB
EKG ATRIAL RATE: 91 BPM
EKG P AXIS: 54 DEGREES
EKG P-R INTERVAL: 152 MS
EKG Q-T INTERVAL: 394 MS
EKG QRS DURATION: 90 MS
EKG QTC CALCULATION (BAZETT): 484 MS
EKG R AXIS: 35 DEGREES
EKG T AXIS: 40 DEGREES
EKG VENTRICULAR RATE: 91 BPM

## 2020-08-03 ENCOUNTER — CARE COORDINATION (OUTPATIENT)
Dept: FAMILY MEDICINE CLINIC | Age: 55
End: 2020-08-03

## 2020-08-03 NOTE — CARE COORDINATION
ACM attempted 7-day outreach for ED follow up, viral symptoms, COVID 19 Protocol    No answer and LVM requesting call back for updates with symptoms, and COVID 19 prevention/precautions.

## 2020-08-06 ENCOUNTER — TELEPHONE (OUTPATIENT)
Dept: PRIMARY CARE CLINIC | Age: 55
End: 2020-08-06

## 2021-03-02 ENCOUNTER — HOSPITAL ENCOUNTER (EMERGENCY)
Age: 56
Discharge: HOME | End: 2021-03-02
Attending: EMERGENCY MEDICINE

## 2021-03-02 ENCOUNTER — TELEPHONE (OUTPATIENT)
Dept: PRIMARY CARE CLINIC | Age: 56
End: 2021-03-02

## 2021-03-02 ENCOUNTER — OFFICE VISIT (OUTPATIENT)
Dept: PRIMARY CARE CLINIC | Age: 56
End: 2021-03-02
Payer: MEDICAID

## 2021-03-02 ENCOUNTER — APPOINTMENT (OUTPATIENT)
Dept: CT IMAGING | Age: 56
End: 2021-03-02

## 2021-03-02 ENCOUNTER — NURSE TRIAGE (OUTPATIENT)
Dept: OTHER | Facility: CLINIC | Age: 56
End: 2021-03-02

## 2021-03-02 VITALS
OXYGEN SATURATION: 96 % | HEART RATE: 110 BPM | HEIGHT: 62 IN | DIASTOLIC BLOOD PRESSURE: 115 MMHG | RESPIRATION RATE: 18 BRPM | BODY MASS INDEX: 44.16 KG/M2 | WEIGHT: 240 LBS | TEMPERATURE: 98.4 F | SYSTOLIC BLOOD PRESSURE: 230 MMHG

## 2021-03-02 VITALS
HEART RATE: 83 BPM | WEIGHT: 235 LBS | OXYGEN SATURATION: 94 % | SYSTOLIC BLOOD PRESSURE: 143 MMHG | DIASTOLIC BLOOD PRESSURE: 88 MMHG | TEMPERATURE: 98.4 F | RESPIRATION RATE: 14 BRPM | HEIGHT: 62 IN | BODY MASS INDEX: 43.24 KG/M2

## 2021-03-02 VITALS
HEIGHT: 62 IN | HEART RATE: 73 BPM | DIASTOLIC BLOOD PRESSURE: 91 MMHG | SYSTOLIC BLOOD PRESSURE: 154 MMHG | OXYGEN SATURATION: 96 % | RESPIRATION RATE: 22 BRPM | BODY MASS INDEX: 43.24 KG/M2 | WEIGHT: 235 LBS | TEMPERATURE: 99.3 F

## 2021-03-02 DIAGNOSIS — I10 ESSENTIAL HYPERTENSION: Primary | ICD-10-CM

## 2021-03-02 DIAGNOSIS — R11.0 NAUSEA: ICD-10-CM

## 2021-03-02 DIAGNOSIS — I16.9 HYPERTENSIVE CRISIS: Primary | ICD-10-CM

## 2021-03-02 DIAGNOSIS — R42 DIZZINESS: ICD-10-CM

## 2021-03-02 DIAGNOSIS — I16.0 HYPERTENSIVE URGENCY: Primary | ICD-10-CM

## 2021-03-02 DIAGNOSIS — F17.200 SMOKER: ICD-10-CM

## 2021-03-02 PROBLEM — R55 POSTURAL DIZZINESS WITH NEAR SYNCOPE: Status: ACTIVE | Noted: 2019-03-17

## 2021-03-02 PROBLEM — R00.2 PALPITATIONS: Status: ACTIVE | Noted: 2017-11-30

## 2021-03-02 PROBLEM — M54.40 LUMBAGO WITH SCIATICA: Status: ACTIVE | Noted: 2021-03-02

## 2021-03-02 PROBLEM — I45.6 WPW (WOLFF-PARKINSON-WHITE SYNDROME): Status: ACTIVE | Noted: 2017-11-30

## 2021-03-02 LAB
ABSOLUTE EOS #: 0.3 K/UL (ref 0–0.4)
ABSOLUTE IMMATURE GRANULOCYTE: ABNORMAL K/UL (ref 0–0.3)
ABSOLUTE LYMPH #: 1.6 K/UL (ref 1–4.8)
ABSOLUTE MONO #: 0.4 K/UL (ref 0.1–1.2)
ANION GAP SERPL CALCULATED.3IONS-SCNC: 9 MMOL/L (ref 9–17)
BASOPHILS # BLD: 1 % (ref 0–2)
BASOPHILS ABSOLUTE: 0 K/UL (ref 0–0.2)
BUN BLDV-MCNC: 14 MG/DL (ref 6–20)
BUN/CREAT BLD: NORMAL (ref 9–20)
CALCIUM SERPL-MCNC: 9.5 MG/DL (ref 8.6–10.4)
CHLORIDE BLD-SCNC: 104 MMOL/L (ref 98–107)
CO2: 29 MMOL/L (ref 20–31)
CREAT SERPL-MCNC: 0.75 MG/DL (ref 0.5–0.9)
DIFFERENTIAL TYPE: ABNORMAL
EOSINOPHILS RELATIVE PERCENT: 4 % (ref 1–4)
GFR AFRICAN AMERICAN: >60 ML/MIN
GFR NON-AFRICAN AMERICAN: >60 ML/MIN
GFR SERPL CREATININE-BSD FRML MDRD: NORMAL ML/MIN/{1.73_M2}
GFR SERPL CREATININE-BSD FRML MDRD: NORMAL ML/MIN/{1.73_M2}
GLUCOSE BLD-MCNC: 85 MG/DL (ref 70–99)
HCT VFR BLD CALC: 45.1 % (ref 36–46)
HEMOGLOBIN: 14.7 G/DL (ref 12–16)
IMMATURE GRANULOCYTES: ABNORMAL %
LYMPHOCYTES # BLD: 22 % (ref 24–44)
MCH RBC QN AUTO: 28.9 PG (ref 26–34)
MCHC RBC AUTO-ENTMCNC: 32.6 G/DL (ref 31–37)
MCV RBC AUTO: 88.5 FL (ref 80–100)
MONOCYTES # BLD: 6 % (ref 2–11)
NRBC AUTOMATED: ABNORMAL PER 100 WBC
PDW BLD-RTO: 14.3 % (ref 12.5–15.4)
PLATELET # BLD: 221 K/UL (ref 140–450)
PLATELET ESTIMATE: ABNORMAL
PMV BLD AUTO: 9.2 FL (ref 6–12)
POTASSIUM SERPL-SCNC: 3.8 MMOL/L (ref 3.7–5.3)
RBC # BLD: 5.09 M/UL (ref 4–5.2)
RBC # BLD: ABNORMAL 10*6/UL
SEG NEUTROPHILS: 67 % (ref 36–66)
SEGMENTED NEUTROPHILS ABSOLUTE COUNT: 5.1 K/UL (ref 1.8–7.7)
SODIUM BLD-SCNC: 142 MMOL/L (ref 135–144)
TROPONIN INTERP: NORMAL
TROPONIN T: NORMAL NG/ML
TROPONIN, HIGH SENSITIVITY: 7 NG/L (ref 0–14)
WBC # BLD: 7.6 K/UL (ref 3.5–11)
WBC # BLD: ABNORMAL 10*3/UL

## 2021-03-02 PROCEDURE — 84484 ASSAY OF TROPONIN QUANT: CPT

## 2021-03-02 PROCEDURE — 96375 TX/PRO/DX INJ NEW DRUG ADDON: CPT

## 2021-03-02 PROCEDURE — 80048 BASIC METABOLIC PNL TOTAL CA: CPT

## 2021-03-02 PROCEDURE — 2500000003 HC RX 250 WO HCPCS: Performed by: EMERGENCY MEDICINE

## 2021-03-02 PROCEDURE — 85025 COMPLETE CBC W/AUTO DIFF WBC: CPT

## 2021-03-02 PROCEDURE — 2580000003 HC RX 258: Performed by: EMERGENCY MEDICINE

## 2021-03-02 PROCEDURE — 96372 THER/PROPH/DIAG INJ SC/IM: CPT

## 2021-03-02 PROCEDURE — 93005 ELECTROCARDIOGRAM TRACING: CPT

## 2021-03-02 PROCEDURE — 6370000000 HC RX 637 (ALT 250 FOR IP): Performed by: EMERGENCY MEDICINE

## 2021-03-02 PROCEDURE — 70450 CT HEAD/BRAIN W/O DYE: CPT

## 2021-03-02 PROCEDURE — 6360000002 HC RX W HCPCS: Performed by: EMERGENCY MEDICINE

## 2021-03-02 PROCEDURE — 96374 THER/PROPH/DIAG INJ IV PUSH: CPT

## 2021-03-02 PROCEDURE — 99285 EMERGENCY DEPT VISIT HI MDM: CPT

## 2021-03-02 PROCEDURE — 36415 COLL VENOUS BLD VENIPUNCTURE: CPT

## 2021-03-02 PROCEDURE — 99213 OFFICE O/P EST LOW 20 MIN: CPT | Performed by: NURSE PRACTITIONER

## 2021-03-02 RX ORDER — ONDANSETRON 2 MG/ML
4 INJECTION INTRAMUSCULAR; INTRAVENOUS ONCE
Status: COMPLETED | OUTPATIENT
Start: 2021-03-02 | End: 2021-03-02

## 2021-03-02 RX ORDER — 0.9 % SODIUM CHLORIDE 0.9 %
500 INTRAVENOUS SOLUTION INTRAVENOUS ONCE
Status: COMPLETED | OUTPATIENT
Start: 2021-03-02 | End: 2021-03-02

## 2021-03-02 RX ORDER — LABETALOL HYDROCHLORIDE 5 MG/ML
5 INJECTION, SOLUTION INTRAVENOUS ONCE
Status: DISCONTINUED | OUTPATIENT
Start: 2021-03-02 | End: 2021-03-02

## 2021-03-02 RX ORDER — LABETALOL HYDROCHLORIDE 5 MG/ML
20 INJECTION, SOLUTION INTRAVENOUS ONCE
Status: COMPLETED | OUTPATIENT
Start: 2021-03-02 | End: 2021-03-02

## 2021-03-02 RX ORDER — KETOROLAC TROMETHAMINE 15 MG/ML
15 INJECTION, SOLUTION INTRAMUSCULAR; INTRAVENOUS ONCE
Status: COMPLETED | OUTPATIENT
Start: 2021-03-02 | End: 2021-03-02

## 2021-03-02 RX ORDER — LABETALOL HYDROCHLORIDE 5 MG/ML
10 INJECTION, SOLUTION INTRAVENOUS ONCE
Status: COMPLETED | OUTPATIENT
Start: 2021-03-02 | End: 2021-03-02

## 2021-03-02 RX ORDER — KETOROLAC TROMETHAMINE 30 MG/ML
30 INJECTION, SOLUTION INTRAMUSCULAR; INTRAVENOUS ONCE
Status: COMPLETED | OUTPATIENT
Start: 2021-03-02 | End: 2021-03-02

## 2021-03-02 RX ORDER — DEXAMETHASONE SODIUM PHOSPHATE 10 MG/ML
10 INJECTION INTRAMUSCULAR; INTRAVENOUS ONCE
Status: COMPLETED | OUTPATIENT
Start: 2021-03-02 | End: 2021-03-02

## 2021-03-02 RX ADMIN — ONDANSETRON 4 MG: 2 INJECTION INTRAMUSCULAR; INTRAVENOUS at 20:36

## 2021-03-02 RX ADMIN — SODIUM CHLORIDE 500 ML: 9 INJECTION, SOLUTION INTRAVENOUS at 14:32

## 2021-03-02 RX ADMIN — ONDANSETRON 4 MG: 2 INJECTION INTRAMUSCULAR; INTRAVENOUS at 14:34

## 2021-03-02 RX ADMIN — DEXAMETHASONE SODIUM PHOSPHATE 10 MG: 10 INJECTION INTRAMUSCULAR; INTRAVENOUS at 14:39

## 2021-03-02 RX ADMIN — KETOROLAC TROMETHAMINE 15 MG: 15 INJECTION, SOLUTION INTRAMUSCULAR; INTRAVENOUS at 14:38

## 2021-03-02 RX ADMIN — KETOROLAC TROMETHAMINE 30 MG: 30 INJECTION, SOLUTION INTRAMUSCULAR; INTRAVENOUS at 20:39

## 2021-03-02 RX ADMIN — LABETALOL HYDROCHLORIDE 10 MG: 5 INJECTION, SOLUTION INTRAVENOUS at 14:36

## 2021-03-02 RX ADMIN — LABETALOL HYDROCHLORIDE 10 MG: 5 INJECTION, SOLUTION INTRAVENOUS at 20:41

## 2021-03-02 RX ADMIN — METOPROLOL TARTRATE 25 MG: 25 TABLET, FILM COATED ORAL at 22:28

## 2021-03-02 ASSESSMENT — PAIN SCALES - GENERAL
PAINLEVEL_OUTOF10: 2
PAINLEVEL_OUTOF10: 8

## 2021-03-02 ASSESSMENT — ENCOUNTER SYMPTOMS
COUGH: 1
NAUSEA: 1
BACK PAIN: 0
VOMITING: 0
PHOTOPHOBIA: 1
NAUSEA: 1
DIARRHEA: 0
ABDOMINAL PAIN: 0
SINUS PRESSURE: 0
CHEST TIGHTNESS: 0
CONSTIPATION: 0
SHORTNESS OF BREATH: 0
VOMITING: 0
COUGH: 0
SINUS PAIN: 0
SORE THROAT: 0

## 2021-03-02 ASSESSMENT — PAIN DESCRIPTION - FREQUENCY: FREQUENCY: INTERMITTENT

## 2021-03-02 ASSESSMENT — PATIENT HEALTH QUESTIONNAIRE - PHQ9
SUM OF ALL RESPONSES TO PHQ QUESTIONS 1-9: 0
2. FEELING DOWN, DEPRESSED OR HOPELESS: 0

## 2021-03-02 ASSESSMENT — PAIN DESCRIPTION - LOCATION: LOCATION: HEAD

## 2021-03-02 ASSESSMENT — PAIN DESCRIPTION - DESCRIPTORS: DESCRIPTORS: PRESSURE

## 2021-03-02 NOTE — TELEPHONE ENCOUNTER
Patient was transferred to me by Rachid Rios with nurse triage. Rachid Rios advised patient should be seen today 03/02/21 due to a severe headache, but that she was screening red. I informed Rachid Rios we could only schedule a virtual visit due to her screening red and she informed me patient needed to be seen in office for blood pressure concerns. Due to this patient was advised to be seen at the [facility] walk in flu clinic or ED per nurse triage. Patient gave verbal understanding and can be reached at [phone]. Thank you.

## 2021-03-02 NOTE — TELEPHONE ENCOUNTER
Reason for Disposition   Patient wants to be seen    Answer Assessment - Initial Assessment Questions  1. LOCATION: \"Where does it hurt? \"       Starts in the temples and then goes around the forehead to back of head    2. ONSET: \"When did the headache start? \" (Minutes, hours or days)       One week ago    3. PATTERN: \"Does the pain come and go, or has it been constant since it started? \"      Constant    4. SEVERITY: \"How bad is the pain? \" and \"What does it keep you from doing? \"  (e.g., Scale 1-10; mild, moderate, or severe)    - MILD (1-3): doesn't interfere with normal activities     - MODERATE (4-7): interferes with normal activities or awakens from sleep     - SEVERE (8-10): excruciating pain, unable to do any normal activities         Yesterday was a 25/10 scale    5. RECURRENT SYMPTOM: \"Have you ever had headaches before? \" If so, ask: \"When was the last time? \" and \"What happened that time? \"       Yes for one week    6. CAUSE: \"What do you think is causing the headache? \"      Unsure, wondering if it is HTN    7. MIGRAINE: \"Have you been diagnosed with migraine headaches? \" If so, ask: \"Is this headache similar? \"       No    8. HEAD INJURY: \"Has there been any recent injury to the head? \"       No    9. OTHER SYMPTOMS: \"Do you have any other symptoms? \" (fever, stiff neck, eye pain, sore throat, cold symptoms)      Dizziness yesterday and double visin    10. PREGNANCY: \"Is there any chance you are pregnant? \" \"When was your last menstrual period? \"        No    Protocols used: HEADACHE-ADULT-OH    Patient called Michelle Santana at [facility] pre-service center [facility])  with red flag complaint. Brief description of triage: Headache for one week    Triage indicates for patient to be seen today in the office. If not able to be seen in the office today due to her red status, she may be referred to the [facility] flu clinic for evaluation or ED.      Care advice provided, patient verbalizes understanding; denies any other questions or concerns; instructed to call back for any new or worsening symptoms. Writer provided warm transfer to  Andrew Boss at [facility] for appointment scheduling. Attention Provider: Thank you for allowing me to participate in the care of your patient. The patient was connected to triage in response to information provided to the [facility]. Please do not respond through this encounter as the response is not directed to a shared pool.

## 2021-03-02 NOTE — PROGRESS NOTES
[facility]  [facility]  [address]  [facility] [address]  Dept: [phone]  Dept Fax: [phone]    Ashly Alcantar is a 50 y.o. female who presents today for her medical conditions/complaints of   Chief Complaint   Patient presents with    Headache     symptoms started 2/23/2021          HPI:     BP (!) 230/115   Pulse 110   Temp 98.4 °F (36.9 °C) (Temporal)   Resp 18   Ht 5' 2\" (1.575 m)   Wt 240 lb (108.9 kg)   SpO2 96%   BMI 43.90 kg/m²       HPI  Pt presented to the clinic care today with c/o headache. This is a new problem. The current episode started 8 days ago. The problem has been constnat since onset. Pain starts in her temples and goes around the forehead to the back of the head. Rates pain 25/10 yesterday and 9/10 today. Pounding and throbbing. Associated symptoms include: dizziness, nausea, double and blurry vision- yesterday, neck pain, cough- she states chronic in nature due to smoking. .  Pertinent negatives include: No fever, chest pain, abdominal pain, congestion, feels weak. Pt has tried Tylenol with no improvement. Nothing has alleviated her symptoms. No history of headache or migraine headache. No recent head injury. No facial asymmetry. No decreased hearing or tinnitus. No numbness or tingling. Speech clear. Tested for COVID 3 weeks ago- negative. Due to her symptoms and HTN- I have advised patient that she needs further evaluation and treatment in the ER. Pt is agreeable to this plan. 1311 Call to [facility] ER to give report and inform of patient being sent there for further evaluation. Left on hold for greater then 6 minutes.      Past Medical History:   Diagnosis Date    Anxiety     Chronic back pain     Hypertension     Mass of adrenal gland ([address]     Obesity     Solitary left kidney     Transient gluten sensitivity 02/21/2019    WPW (Emiliana-Parkinson-White syndrome) 2009 Past Surgical History:   Procedure Laterality Date    ABLATION OF DYSRHYTHMIC FOCUS      x 2-3 due to WPW    APPENDECTOMY      ARM SURGERY      COLONOSCOPY N/A 2/21/2019    COLONOSCOPY POLYPECTOMY SNARE/COLD BIOPSY performed by Milagro Martinez MD at [address] [facility] ENDOSCOPY N/A 2/21/2019    EGD BIOPSY performed by Milagro Martinez MD at [address] History   Problem Relation Age of Onset    Stroke Mother     Cancer Mother         bladder    Diabetes Father     Diabetes Sister     Ovarian Cancer Sister        Social History     Tobacco Use    Smoking status: Current Every Day Smoker     Packs/day: 0.10     Types: Cigarettes    Smokeless tobacco: Never Used    Tobacco comment: decreasing iwth wellbutrin, currently about 3 cigarettes per day   Substance Use Topics    Alcohol use: No        Prior to Visit Medications    Medication Sig Taking? Authorizing Provider   buPROPion (WELLBUTRIN SR) 200 MG extended release tablet Take 200 mg by mouth 2 times daily  Historical Provider, MD       Allergies   Allergen Reactions    Gluten Meal      Abdominal pain    Pcn [Penicillins] Hives         Subjective:      Review of Systems   Constitutional: Positive for fatigue. Eyes: Positive for visual disturbance (double vision/blurry vision yesterday). Respiratory: Positive for cough (chronic/smoker). Gastrointestinal: Positive for nausea. Negative for vomiting. Musculoskeletal: Positive for neck pain. Neurological: Positive for dizziness and headaches. Negative for syncope, speech difficulty and numbness. Objective:     Physical Exam  HENT:      Head: Normocephalic and atraumatic. Cardiovascular:      Rate and Rhythm: Tachycardia present. Neurological:      Mental Status: She is alert. MEDICAL DECISION MAKING Assessment/Plan:     Alec Sinha was seen today for headache.     Diagnoses and all orders for this visit:    Hypertensive crisis    Nausea Ventricular Rate 91 BPM    Atrial Rate 91 BPM    P-R Interval 152 ms    QRS Duration 90 ms    Q-T Interval 394 ms    QTc Calculation (Bazett) 484 ms    P Axis 54 degrees    R Axis 35 degrees    T Axis 40 degrees       Patient was sent to the [facility] ER for further evaluation and treatment due to her HTN, dizziness, neck pain, nausea. Report of double, blurry vision yesterday. Exam limited due to severity of the symptoms - sent to [facility] ER. Pt sent to the ER. Attempted to call report- was left on hold for greater then 6 minutes and unable to remain on the line. Patient given educational materials - see patientinstructions. Discussed use, benefit, and side effects of prescribed medications. All patient questions answered. Pt verbalized understanding. Instructed to continue current medications, diet and exercise. Patient agreed with treatment plan. Follow up as directed.      Electronically signed by DK Schrader CNP on 3/2/2021 at 1:45 PM

## 2021-03-02 NOTE — ED NOTES
Mode of arrival (squad #, walk in, police, etc) : walk in        Chief complaint(s): HA        Arrival Note (brief scenario, treatment PTA, etc). : Pt AOx4. RR easy,unlabored. Pt reports onset of HA 1 week PTA. Pt reports intermittent in nature-anterior/top pressure. Denies injury. . Pt reports worsening yesterday. Pt states she went to  where BP was found to be elevated. Pt states she has not been on BP meds \"in forever\". Pt c/o nausea as well. Pt ambulates to room- gait steady. Pt eupneic.  Denies CP/SOB             Rossana Patino, RN  03/02/21 5233

## 2021-03-02 NOTE — LETTER
[address] Counts include 234 beds at the [facility] ED  [address] [facility] [address]  Phone: [phone]  Fax: [phone]               March 2, 2021    Patient: Emily Bailey   YOB: 1965   Date of Visit: 3/2/2021       To Whom It May Concern:    Marcia Rivera was seen and treated in our emergency department on 3/2/2021. She may return to work on 03/03/2021 .       Sincerely,       maxine elise RN         Signature:__________________________________

## 2021-03-02 NOTE — TELEPHONE ENCOUNTER
Call received by office from [facility] (MARIELLE) that patient was screening red. Complained of \"the worst headache of her life and a cough\"    Because of screening red and cough, advised that patient be seen at [facility]. ECC caller verbalized understanding but BP was never mentioned to writer.     Patient seen and evaluated at [facility]

## 2021-03-02 NOTE — ED PROVIDER NOTES
[address] [facility]  [address] [facility] [address]  Phone: [phone]  Fax: [place] [phone]      Pt Name: Kevin King  MRN: [id number]  [place] 1965  Date of evaluation: 3/2/2021    CHIEF COMPLAINT       Chief Complaint   Patient presents with    Migraine     ongoing 1 week       HISTORY OF PRESENT ILLNESS    Kevin King is a 50 y.o. female who presents to the emergency department planing of 8/10 global headache progressively worsening over the past week. Gradual in onset. Does not typically get headaches like this. Is never been to the ER for them. Denies any neck pain no double vision had some blurry vision yesterday but none today. She admits to nausea without vomiting. She has history of hypertension presents with a blood pressure of 196/120. Denies chest pain or shortness of breath. No other symptoms. REVIEW OF SYSTEMS       Constitutional: No fevers or chills   HEENT: No sore throat, rhinorrhea, or earache   Eyes: No blurry vision or double vision no drainage   Cardiovascular: No chest pain or tachycardia   Respiratory: No wheezing or shortness of breath no cough   Gastrointestinal: Positive nausea no vomiting, diarrhea, constipation, or abdominal pain   : No hematuria or dysuria   Musculoskeletal: No swelling or pain   Skin: No rash   Neurological: No focal neurologic complaints, paresthesias, weakness, positive headache     PAST MEDICAL HISTORY    has a past medical history of Anxiety, Chronic back pain, Hypertension, Mass of adrenal gland ([address] Obesity, Solitary left kidney, Transient gluten sensitivity, and WPW (Emiliana-Parkinson-White syndrome). SURGICAL HISTORY      has a past surgical history that includes Tonsillectomy; Appendectomy; Arm Surgery; ablation of dysrhythmic focus; Upper gastrointestinal endoscopy (N/A, 2/21/2019); and Colonoscopy (N/A, 2/21/2019).     CURRENT MEDICATIONS       Previous Medications    BUPROPION (WELLBUTRIN SR) 200 MG EXTENDED RELEASE TABLET    Take 200 mg by mouth 2 times daily       ALLERGIES     is allergic to gluten meal and pcn [penicillins]. FAMILY HISTORY     She indicated that her mother is . She indicated that the status of her father is unknown. She indicated that her sister is alive. family history includes Cancer in her mother; Diabetes in her father and sister; Ovarian Cancer in her sister; Stroke in her mother. SOCIAL HISTORY      reports that she has been smoking cigarettes. She has been smoking about 0.10 packs per day. She has never used smokeless tobacco. She reports current drug use. Drug: Marijuana. She reports that she does not drink alcohol. PHYSICAL EXAM       ED Triage Vitals [21 1328]   BP Temp Temp Source Pulse Resp SpO2 Height Weight   (!) 196/123 98.4 °F (36.9 °C) Oral 98 14 98 % 5' 2\" (1.575 m) 235 lb (106.6 kg)       Constitutional: Alert, oriented x3, nontoxic, answering questions appropriately, acting properly for age, in no acute distress   HEENT: Extraocular muscles intact, mucus membranes moist, no posterior pharyngeal erythema or exudates, Pupils equal, round, reactive to light, no photophobia  Neck: Trachea midline no meningismus  Cardiovascular: Regular rhythm and rate no S3, S4, or murmurs   Respiratory: Clear to auscultation bilaterally no wheezes, rhonchi, rales, no respiratory distress no tachypnea no retractions no hypoxia  Gastrointestinal: Soft, nontender, nondistended, positive bowel sounds. No rebound, rigidity, or guarding. Musculoskeletal: No extremity pain or swelling   Neurologic: Moving all 4 extremities without difficulty there are no gross focal neurologic deficits finger-to-nose and heel-to-shin normal.   strength +5/5 bilaterally. C5-C8 intact bilaterally. Skin: Warm and dry       DIFFERENTIAL DIAGNOSIS/ MDM:     IV fluids and labs. Pain control. CT head. EKG. Blood pressure control.     DIAGNOSTIC RESULTS EKG: All EKG's are interpreted by the Emergency Department Physician who either signs or Co-signs this chart in the absence of a cardiologist.    1449 sinus rhythm rate 71  QRS 98  no acute ST or T wave changes    Not indicated unless otherwise documented above    LABS:  Results for orders placed or performed during the hospital encounter of 03/02/21   CBC Auto Differential   Result Value Ref Range    WBC 7.6 3.5 - 11.0 k/uL    RBC 5.09 4.0 - 5.2 m/uL    Hemoglobin 14.7 12.0 - 16.0 g/dL    Hematocrit 45.1 36 - 46 %    MCV 88.5 80 - 100 fL    MCH 28.9 26 - 34 pg    MCHC 32.6 31 - 37 g/dL    RDW 14.3 12.5 - 15.4 %    Platelets 590 352 - 450 k/uL    MPV 9.2 6.0 - 12.0 fL    NRBC Automated NOT REPORTED per 100 WBC    Differential Type NOT REPORTED     Seg Neutrophils 67 (H) 36 - 66 %    Lymphocytes 22 (L) 24 - 44 %    Monocytes 6 2 - 11 %    Eosinophils % 4 1 - 4 %    Basophils 1 0 - 2 %    Immature Granulocytes NOT REPORTED 0 %    Segs Absolute 5.10 1.8 - 7.7 k/uL    Absolute Lymph # 1.60 1.0 - 4.8 k/uL    Absolute Mono # 0.40 0.1 - 1.2 k/uL    Absolute Eos # 0.30 0.0 - 0.4 k/uL    Basophils Absolute 0.00 0.0 - 0.2 k/uL    Absolute Immature Granulocyte NOT REPORTED 0.00 - 0.30 k/uL    WBC Morphology NOT REPORTED     RBC Morphology NOT REPORTED     Platelet Estimate NOT REPORTED    Basic Metabolic Panel   Result Value Ref Range    Glucose 85 70 - 99 mg/dL    BUN 14 6 - 20 mg/dL    CREATININE 0.75 0.50 - 0.90 mg/dL    Bun/Cre Ratio NOT REPORTED 9 - 20    Calcium 9.5 8.6 - 10.4 mg/dL    Sodium 142 135 - 144 mmol/L    Potassium 3.8 3.7 - 5.3 mmol/L    Chloride 104 98 - 107 mmol/L    CO2 29 20 - 31 mmol/L    Anion Gap 9 9 - 17 mmol/L    GFR Non-African American >60 >60 mL/min    GFR African American >60 >60 mL/min    GFR Comment          GFR Staging NOT REPORTED    Troponin   Result Value Ref Range    Troponin, High Sensitivity 7 0 - 14 ng/L    Troponin T NOT REPORTED <0.03 ng/mL    Troponin Interp NOT REPORTED        Not indicated unless otherwise documented above    RADIOLOGY:   I reviewed the radiologist interpretations:    CT HEAD WO CONTRAST   Final Result   No evidence of acute intracranial process. Not indicated unless otherwise documented above    EMERGENCY DEPARTMENT COURSE:     The patient was given the following medications:  Orders Placed This Encounter   Medications    0.9 % sodium chloride bolus    ondansetron (ZOFRAN) injection 4 mg    dexamethasone (DECADRON) injection 10 mg    ketorolac (TORADOL) injection 15 mg    labetalol (NORMODYNE;TRANDATE) injection 20 mg        Vitals:   -------------------------  BP (!) 149/96   Pulse 61   Temp 98.4 °F (36.9 °C) (Oral)   Resp 14   Ht 5' 2\" (1.575 m)   Wt 106.6 kg (235 lb)   SpO2 98%   BMI 42.98 kg/m²     2:50 PM blood pressure improving after just 10mg of labetalol. Awaiting labs. CT of the head unremarkable. 3:45 PM blood pressure 140s over 70 feeling much better headache completely resolved. Labs unremarkable CT of the head negative. Patient has been off of her blood pressure medications for a couple of years. Her doctor took her off of them. It might be time to get them restarted she is to call her doctor tomorrow. At this time no chest pain or shortness of breath no gross focal neurologic deficits will discharge with close follow-up and return if worse. Again headache resolved. The patient understands that at this time there is no evidence for a more malignant underlying process, but also understands that early in the process of an illness or injury, an emergency department workup can be falsely reassuring. Routine discharge counseling was given, and it is understood that worsening, changing or persistent symptoms should prompt an immediate call or follow up with their primary physician or return to the emergency department. The importance of appropriate follow up was also discussed.   I have reviewed the

## 2021-03-03 ENCOUNTER — HOSPITAL ENCOUNTER (EMERGENCY)
Age: 56
Discharge: HOME | End: 2021-03-03
Attending: EMERGENCY MEDICINE

## 2021-03-03 VITALS
BODY MASS INDEX: 43.24 KG/M2 | SYSTOLIC BLOOD PRESSURE: 145 MMHG | RESPIRATION RATE: 16 BRPM | WEIGHT: 235 LBS | HEIGHT: 62 IN | OXYGEN SATURATION: 94 % | TEMPERATURE: 98.2 F | HEART RATE: 88 BPM | DIASTOLIC BLOOD PRESSURE: 82 MMHG

## 2021-03-03 DIAGNOSIS — R51.9 NONINTRACTABLE EPISODIC HEADACHE, UNSPECIFIED HEADACHE TYPE: ICD-10-CM

## 2021-03-03 DIAGNOSIS — I10 ESSENTIAL HYPERTENSION: Primary | ICD-10-CM

## 2021-03-03 PROCEDURE — 99285 EMERGENCY DEPT VISIT HI MDM: CPT

## 2021-03-03 PROCEDURE — 2500000003 HC RX 250 WO HCPCS: Performed by: PHYSICIAN ASSISTANT

## 2021-03-03 PROCEDURE — 96375 TX/PRO/DX INJ NEW DRUG ADDON: CPT

## 2021-03-03 PROCEDURE — 96374 THER/PROPH/DIAG INJ IV PUSH: CPT

## 2021-03-03 PROCEDURE — 6360000002 HC RX W HCPCS: Performed by: PHYSICIAN ASSISTANT

## 2021-03-03 RX ORDER — DEXAMETHASONE SODIUM PHOSPHATE 10 MG/ML
10 INJECTION INTRAMUSCULAR; INTRAVENOUS ONCE
Status: COMPLETED | OUTPATIENT
Start: 2021-03-03 | End: 2021-03-03

## 2021-03-03 RX ORDER — LABETALOL HYDROCHLORIDE 5 MG/ML
10 INJECTION, SOLUTION INTRAVENOUS ONCE
Status: COMPLETED | OUTPATIENT
Start: 2021-03-03 | End: 2021-03-03

## 2021-03-03 RX ORDER — KETOROLAC TROMETHAMINE 15 MG/ML
15 INJECTION, SOLUTION INTRAMUSCULAR; INTRAVENOUS ONCE
Status: COMPLETED | OUTPATIENT
Start: 2021-03-03 | End: 2021-03-03

## 2021-03-03 RX ORDER — DIPHENHYDRAMINE HYDROCHLORIDE 50 MG/ML
50 INJECTION INTRAMUSCULAR; INTRAVENOUS ONCE
Status: COMPLETED | OUTPATIENT
Start: 2021-03-03 | End: 2021-03-03

## 2021-03-03 RX ORDER — DROPERIDOL 2.5 MG/ML
1.25 INJECTION, SOLUTION INTRAMUSCULAR; INTRAVENOUS EVERY 6 HOURS PRN
Status: DISCONTINUED | OUTPATIENT
Start: 2021-03-03 | End: 2021-03-03 | Stop reason: HOSPADM

## 2021-03-03 RX ORDER — ALBUTEROL SULFATE 2.5 MG/3ML
2.5 SOLUTION RESPIRATORY (INHALATION) ONCE
Status: COMPLETED | OUTPATIENT
Start: 2021-03-03 | End: 2021-03-03

## 2021-03-03 RX ADMIN — KETOROLAC TROMETHAMINE 15 MG: 15 INJECTION, SOLUTION INTRAMUSCULAR; INTRAVENOUS at 19:45

## 2021-03-03 RX ADMIN — DROPERIDOL 1.25 MG: 2.5 INJECTION, SOLUTION INTRAMUSCULAR; INTRAVENOUS at 20:36

## 2021-03-03 RX ADMIN — DEXAMETHASONE SODIUM PHOSPHATE 10 MG: 10 INJECTION INTRAMUSCULAR; INTRAVENOUS at 19:44

## 2021-03-03 RX ADMIN — ALBUTEROL SULFATE 2.5 MG: 2.5 SOLUTION RESPIRATORY (INHALATION) at 19:53

## 2021-03-03 RX ADMIN — DIPHENHYDRAMINE HYDROCHLORIDE 50 MG: 50 INJECTION, SOLUTION INTRAMUSCULAR; INTRAVENOUS at 19:44

## 2021-03-03 RX ADMIN — LABETALOL HYDROCHLORIDE 10 MG: 5 INJECTION, SOLUTION INTRAVENOUS at 19:24

## 2021-03-03 ASSESSMENT — PAIN DESCRIPTION - DESCRIPTORS: DESCRIPTORS: HEADACHE

## 2021-03-03 ASSESSMENT — PAIN DESCRIPTION - PAIN TYPE
TYPE: ACUTE PAIN
TYPE: ACUTE PAIN

## 2021-03-03 ASSESSMENT — PAIN DESCRIPTION - LOCATION: LOCATION: HEAD

## 2021-03-03 ASSESSMENT — PAIN SCALES - GENERAL: PAINLEVEL_OUTOF10: 2

## 2021-03-03 NOTE — ED NOTES
Pt arrives with c/o frontal headache and HTN. Pt reports she was seen in this ED 2 times yesterday for same complaint. Pt states \"they gave me IV fluids and meds and then sent me home a prescription for metoprolol. \" Pt reports she picked the metoprolol up from the pharmacy this afternoon and took her first dose around 3pm. Pt reports her BP continues to be elevated and she has headache. Pt denies vision changes, weakness, changes in speech, n/v/d. Pt denies chest pain or SOB. Pt in NAD.      Nilda Sam, RN  03/03/21 8888

## 2021-03-03 NOTE — ED NOTES
Pt ambulates to room- gait steady. Rr easy,unlabored. Pt seen in this ED earlier today for same symptoms. Pt given fluids/decadron/zofran/toradol/10 lebatolol- symptoms resolved. Advised to follow up with PCP.      Maggie Woo RN  03/02/21 2007

## 2021-03-04 NOTE — ED PROVIDER NOTES
Emergency Department     Faculty Attestation    I performed a history and physical examination of the patient and discussed management with the mid level provideer. I reviewed the mid level provider's note and agree with the documented findings and plan of care. Any areas of disagreement are noted on the chart. I was personally present for the key portions of any procedures. I have documented in the chart those procedures where I was not present during the key portions. I have reviewed the emergency nurses triage note. I agree with the chief complaint, past medical history, past surgical history, allergies, medications, social and family history as documented unless otherwise noted below. Documentation of the HPI, Physical Exam and Medical Decision Making performed by medical students or scribes is based on my personal performance of the HPI, PE and MDM. For Physician Assistant/ Nurse Practitioner cases/documentation I have personally evaluated this patient and have completed at least one if not all key elements of the E/M (history, physical exam, and MDM). Additional findings are as noted. Primary Care Physician:  DK Forte - CNP    CHIEF COMPLAINT       Chief Complaint   Patient presents with    Headache    Hypertension       RECENT VITALS:   Temp: 98.2 °F (36.8 °C),  Pulse: 88, Resp: 16, BP: (!) 145/82    LABS:  Labs Reviewed - No data to display      PERTINENT ATTENDING PHYSICIAN COMMENTS:    53-year-old female patient presents to the emergency department for evaluation of bifrontal headache since about 1 week, worse tonight. Patient has been evaluated twice yesterday for elevated blood pressure and headache. Patient has history of hypertension and her blood pressure medicines were discontinued about 2 years ago and she was asked to restart 6 months later but she has not taken so far. She was prescribed metoprolol 25 mg twice daily yesterday.   She denies any visual disturbances, neck pain, stiffness, tingling, numbness or weakness in any of the extremities. I reviewed her work-up including EKG, CAT scan of the brain, CBC, chemistries and 2 sets of cardiac markers. These were unremarkable. Patient is afebrile and blood pressure is 172/102 upon arrival.  She does not have any papilledema. There is no neck stiffness. Patient has bilateral wheezing posteriorly. Jessi Linger Rhythm is regular without murmurs. There is no pedal edema and good pedal pulses. Neurologically no focal deficits. Gait is normal.  During the ED stay, patient was given labetalol 10 mg IV push and blood pressure gradually came down to 145/82. She was also given Toradol, Benadryl and Decadron IV push. She was also given albuterol aerosol treatment. Her breathing has improved but she continues having headache. She was further given droperidol 1.25 mg IV push and her headache is resolved. She was advised to increase the dose of metoprolol to 50 mg twice daily, follow-up with PCP, return if worse.        Kameron Mckeon MD  03/04/21 5768

## 2021-03-04 NOTE — ED PROVIDER NOTES
[address] [facility] ED  [address] [facility] [address]  Phone: [phone]  Fax: Ean [address]      Pt Name: Stephen Tsai  MRN: [id number]  [place] 1965  Date of evaluation: 3/3/2021  Provider: Ivan Lin PA-C    CHIEF COMPLAINT       Chief Complaint   Patient presents with    Headache    Hypertension           HISTORY OF PRESENT ILLNESS  (Location/Symptom, Timing/Onset, Context/Setting, Quality, Duration, Modifying Factors, Severity.)   Stephen Tsai is a 50 y.o. female who presents to the emergency department for evaluation of frontal HA that started to increase just prior to arrival.  She has been having frontal HA for about a week. She was here 2x yesterday for HA and elevated blood pressure. She used to take HTN med a few years ago but she quit taking it. She was put back on Metroprolol 25mg BID starting yesterday. She filled and took first dose today at 3pm.  No CP/resp symptoms/back-abd-urinary symptoms or vision changes/deficits. HA similar in character to previous. She has upcoming PCP appt scheduled she confirms. No other complaints. Nursing Notes were reviewed. REVIEW OF SYSTEMS    (2-9 systems for level 4, 10 or more for level 5)     Review of Systems   Constitutional: Negative. HENT: Negative. Eyes: Negative. Respiratory: Negative. Cardiovascular: Negative. Gastrointestinal: Negative. Musculoskeletal: Negative. Endocrine: Negative. Genitourinary: Negative. Skin: Negative. Allergic/Immunologic: Negative. Neurological: Negative. Hematological: Negative. Psychiatric/Behavioral: Negative. Except as noted above the remainder of the review of systems was reviewed and negative. PAST MEDICAL HISTORY   History reviewed.     Past Medical History:   Diagnosis Date    Anxiety     Chronic back pain     Hypertension     Mass of adrenal gland ([address]     Obesity     Solitary left kidney     Transient gluten sensitivity 2019    WPW (Emiliana-Parkinson-White syndrome) 2009         SURGICAL HISTORY     History reviewed. Past Surgical History:   Procedure Laterality Date    ABLATION OF DYSRHYTHMIC FOCUS      x 2-3 due to WPW    APPENDECTOMY      ARM SURGERY      COLONOSCOPY N/A 2019    COLONOSCOPY POLYPECTOMY SNARE/COLD BIOPSY performed by Kala Ortiz MD at [facility] N/A 2019    EGD BIOPSY performed by Kala Ortiz MD at [address] [place] [address]       Previous Medications    BUPROPION (WELLBUTRIN SR) 200 MG EXTENDED RELEASE TABLET    Take 200 mg by mouth 2 times daily    METOPROLOL TARTRATE (LOPRESSOR) 25 MG TABLET    Take 1 tablet by mouth 2 times daily       ALLERGIES     Gluten meal and Pcn [penicillins]    FAMILY HISTORY           Problem Relation Age of Onset    Stroke Mother     Cancer Mother         bladder    Diabetes Father     Diabetes Sister     Ovarian Cancer Sister      Family Status   Relation Name Status    Mother      Father  (Not Specified)    Sister  Alive          SOCIAL HISTORY      reports that she has been smoking cigarettes. She has been smoking about 0.10 packs per day. She has never used smokeless tobacco. She reports current drug use. Drug: Marijuana. She reports that she does not drink alcohol. lives at home with other     PHYSICAL EXAM    (up to 7 for level 4, 8 or more for level 5)     ED Triage Vitals [21 1842]   BP Temp Temp Source Pulse Resp SpO2 Height Weight   (!) 172/102 98.2 °F (36.8 °C) Oral 109 16 95 % 5' 2\" (1.575 m) 235 lb (106.6 kg)       Physical Exam   Nursing note and vitals reviewed. Constitutional:   Well-developed and well-nourished. Head: Normocephalic and atraumatic. Ear: External ears normal.   Nose: Nose normal and midline. Eyes: Conjunctivae and EOM are normal. Pupils are equal/round. Photophobia.    Neck: Full ROM, no midline TTP or meningismus. Cardiovascular: Normal rate, regular rhythm, normal heart sounds   Pulmonary/Chest: Effort normal and breath sounds normal. No wheezes/rales/rhonchi. Musculoskeletal: Normal to inspection. NV intact x 4. Neurological: Alert, age appropriate mentation and interaction. Skin: Skin is warm and dry. No rash noted. Psychiatric: Mood, memory, affect and judgment normal.       DIAGNOSTIC RESULTS     EKG: All EKG's are interpreted by the Emergency Department Physician who either signs or Co-signs this chart in the absence of a cardiologist.    Not indicated OR per attending note    RADIOLOGY:   Non-plain film images such as CT, Ultrasound and MRI are read by the radiologist. Plain radiographic images are visualized and preliminarily interpreted by the emergency physician with the below findings:      Interpretation per the Radiologist below, if available at the time of this note:    No orders to display           LABS:  Labs Reviewed - No data to display      All other labs were within normal range or not returned as of this dictation. EMERGENCY DEPARTMENT COURSE and DIFFERENTIAL DIAGNOSIS/MDM:   Vitals:    Vitals:    03/03/21 1842 03/03/21 1853 03/03/21 1953 03/03/21 2021   BP: (!) 172/102 (!) 166/95  (!) 160/90   Pulse: 109 98  88   Resp: 16 16  16   Temp: 98.2 °F (36.8 °C)      TempSrc: Oral      SpO2: 95% 96% 96% 94%   Weight: 106.6 kg (235 lb)      Height: 5' 2\" (1.575 m)          2057  Pt reports some relief after Droperidol dose. Attending requested pt increase Metoprolol to 50mg BID as this was her previous HTN regiment prior to her stopping this a few years ago. Return to ED for worsening symptoms or other concerns. Pt agreeable to plan. PCP f/u recommended. I have reviewed the disposition diagnosis with the patient and or their family/guardian. I have answered their questions and given discharge instructions.   They voiced understanding of these instructions and did not have any

## 2021-03-05 ENCOUNTER — HOSPITAL ENCOUNTER (INPATIENT)
Age: 56
LOS: 1 days | Discharge: HOME | DRG: 305 | End: 2021-03-06
Attending: EMERGENCY MEDICINE | Admitting: INTERNAL MEDICINE
Payer: MEDICAID

## 2021-03-05 ENCOUNTER — APPOINTMENT (OUTPATIENT)
Dept: GENERAL RADIOLOGY | Age: 56
DRG: 305 | End: 2021-03-05

## 2021-03-05 DIAGNOSIS — R07.9 CHEST PAIN, UNSPECIFIED TYPE: Primary | ICD-10-CM

## 2021-03-05 DIAGNOSIS — I16.0 HYPERTENSIVE URGENCY: ICD-10-CM

## 2021-03-05 PROBLEM — F32.A DEPRESSION: Status: ACTIVE | Noted: 2021-03-05

## 2021-03-05 LAB
-: NORMAL
ABSOLUTE EOS #: 0.1 K/UL (ref 0–0.4)
ABSOLUTE IMMATURE GRANULOCYTE: ABNORMAL K/UL (ref 0–0.3)
ABSOLUTE LYMPH #: 3.4 K/UL (ref 1–4.8)
ABSOLUTE MONO #: 0.8 K/UL (ref 0.1–1.2)
AMORPHOUS: NORMAL
ANION GAP SERPL CALCULATED.3IONS-SCNC: 10 MMOL/L (ref 9–17)
BACTERIA: NORMAL
BASOPHILS # BLD: 1 % (ref 0–2)
BASOPHILS ABSOLUTE: 0.1 K/UL (ref 0–0.2)
BILIRUBIN URINE: NEGATIVE
BNP INTERPRETATION: ABNORMAL
BUN BLDV-MCNC: 21 MG/DL (ref 6–20)
BUN/CREAT BLD: ABNORMAL (ref 9–20)
CALCIUM SERPL-MCNC: 9.7 MG/DL (ref 8.6–10.4)
CASTS UA: NORMAL /LPF
CHLORIDE BLD-SCNC: 105 MMOL/L (ref 98–107)
CO2: 27 MMOL/L (ref 20–31)
COLOR: YELLOW
COMMENT UA: NORMAL
CREAT SERPL-MCNC: 0.86 MG/DL (ref 0.5–0.9)
CRYSTALS, UA: NORMAL /HPF
DIFFERENTIAL TYPE: ABNORMAL
EKG ATRIAL RATE: 59 BPM
EKG ATRIAL RATE: 75 BPM
EKG P AXIS: 27 DEGREES
EKG P AXIS: 58 DEGREES
EKG P-R INTERVAL: 130 MS
EKG P-R INTERVAL: 140 MS
EKG Q-T INTERVAL: 398 MS
EKG Q-T INTERVAL: 440 MS
EKG QRS DURATION: 98 MS
EKG QRS DURATION: 98 MS
EKG QTC CALCULATION (BAZETT): 435 MS
EKG QTC CALCULATION (BAZETT): 444 MS
EKG R AXIS: 42 DEGREES
EKG R AXIS: 43 DEGREES
EKG T AXIS: 38 DEGREES
EKG T AXIS: 40 DEGREES
EKG VENTRICULAR RATE: 59 BPM
EKG VENTRICULAR RATE: 75 BPM
EOSINOPHILS RELATIVE PERCENT: 1 % (ref 1–4)
EPITHELIAL CELLS UA: NORMAL /HPF (ref 0–5)
GFR AFRICAN AMERICAN: >60 ML/MIN
GFR NON-AFRICAN AMERICAN: >60 ML/MIN
GFR SERPL CREATININE-BSD FRML MDRD: ABNORMAL ML/MIN/{1.73_M2}
GFR SERPL CREATININE-BSD FRML MDRD: ABNORMAL ML/MIN/{1.73_M2}
GLUCOSE BLD-MCNC: 97 MG/DL (ref 70–99)
GLUCOSE URINE: NEGATIVE
HCT VFR BLD CALC: 43.2 % (ref 36–46)
HEMOGLOBIN: 14.2 G/DL (ref 12–16)
IMMATURE GRANULOCYTES: ABNORMAL %
KETONES, URINE: NEGATIVE
LEUKOCYTE ESTERASE, URINE: NEGATIVE
LV EF: 55 %
LVEF MODALITY: NORMAL
LYMPHOCYTES # BLD: 29 % (ref 24–44)
MCH RBC QN AUTO: 29 PG (ref 26–34)
MCHC RBC AUTO-ENTMCNC: 32.8 G/DL (ref 31–37)
MCV RBC AUTO: 88.3 FL (ref 80–100)
MONOCYTES # BLD: 7 % (ref 2–11)
MUCUS: NORMAL
NITRITE, URINE: NEGATIVE
NRBC AUTOMATED: ABNORMAL PER 100 WBC
OTHER OBSERVATIONS UA: NORMAL
PDW BLD-RTO: 14.5 % (ref 12.5–15.4)
PH UA: 6.5 (ref 5–8)
PLATELET # BLD: 246 K/UL (ref 140–450)
PLATELET ESTIMATE: ABNORMAL
PMV BLD AUTO: 9.6 FL (ref 6–12)
POTASSIUM SERPL-SCNC: 3.8 MMOL/L (ref 3.7–5.3)
PRO-BNP: 403 PG/ML
PROTEIN UA: NEGATIVE
RBC # BLD: 4.89 M/UL (ref 4–5.2)
RBC # BLD: ABNORMAL 10*6/UL
RBC UA: NORMAL /HPF (ref 0–2)
RENAL EPITHELIAL, UA: NORMAL /HPF
SARS-COV-2, RAPID: NOT DETECTED
SEG NEUTROPHILS: 62 % (ref 36–66)
SEGMENTED NEUTROPHILS ABSOLUTE COUNT: 7.5 K/UL (ref 1.8–7.7)
SODIUM BLD-SCNC: 142 MMOL/L (ref 135–144)
SPECIFIC GRAVITY UA: 1.01 (ref 1–1.03)
SPECIMEN DESCRIPTION: NORMAL
TRICHOMONAS: NORMAL
TROPONIN INTERP: NORMAL
TROPONIN T: NORMAL NG/ML
TROPONIN, HIGH SENSITIVITY: 12 NG/L (ref 0–14)
TROPONIN, HIGH SENSITIVITY: 13 NG/L (ref 0–14)
TROPONIN, HIGH SENSITIVITY: 7 NG/L (ref 0–14)
TURBIDITY: CLEAR
URINE HGB: NEGATIVE
UROBILINOGEN, URINE: NORMAL
WBC # BLD: 11.9 K/UL (ref 3.5–11)
WBC # BLD: ABNORMAL 10*3/UL
WBC UA: NORMAL /HPF (ref 0–5)
YEAST: NORMAL

## 2021-03-05 PROCEDURE — 99223 1ST HOSP IP/OBS HIGH 75: CPT | Performed by: INTERNAL MEDICINE

## 2021-03-05 PROCEDURE — 6370000000 HC RX 637 (ALT 250 FOR IP): Performed by: EMERGENCY MEDICINE

## 2021-03-05 PROCEDURE — 2580000003 HC RX 258: Performed by: NURSE PRACTITIONER

## 2021-03-05 PROCEDURE — 36415 COLL VENOUS BLD VENIPUNCTURE: CPT

## 2021-03-05 PROCEDURE — 85025 COMPLETE CBC W/AUTO DIFF WBC: CPT

## 2021-03-05 PROCEDURE — 94760 N-INVAS EAR/PLS OXIMETRY 1: CPT

## 2021-03-05 PROCEDURE — 96374 THER/PROPH/DIAG INJ IV PUSH: CPT

## 2021-03-05 PROCEDURE — 93306 TTE W/DOPPLER COMPLETE: CPT

## 2021-03-05 PROCEDURE — 93005 ELECTROCARDIOGRAM TRACING: CPT | Performed by: EMERGENCY MEDICINE

## 2021-03-05 PROCEDURE — 83835 ASSAY OF METANEPHRINES: CPT

## 2021-03-05 PROCEDURE — 71045 X-RAY EXAM CHEST 1 VIEW: CPT

## 2021-03-05 PROCEDURE — 99285 EMERGENCY DEPT VISIT HI MDM: CPT

## 2021-03-05 PROCEDURE — 6370000000 HC RX 637 (ALT 250 FOR IP): Performed by: NURSE PRACTITIONER

## 2021-03-05 PROCEDURE — 81001 URINALYSIS AUTO W/SCOPE: CPT

## 2021-03-05 PROCEDURE — U0002 COVID-19 LAB TEST NON-CDC: HCPCS

## 2021-03-05 PROCEDURE — 2060000000 HC ICU INTERMEDIATE R&B

## 2021-03-05 PROCEDURE — 6360000002 HC RX W HCPCS: Performed by: NURSE PRACTITIONER

## 2021-03-05 PROCEDURE — 2500000003 HC RX 250 WO HCPCS: Performed by: EMERGENCY MEDICINE

## 2021-03-05 PROCEDURE — 94640 AIRWAY INHALATION TREATMENT: CPT

## 2021-03-05 PROCEDURE — 93005 ELECTROCARDIOGRAM TRACING: CPT | Performed by: INTERNAL MEDICINE

## 2021-03-05 PROCEDURE — 80048 BASIC METABOLIC PNL TOTAL CA: CPT

## 2021-03-05 PROCEDURE — 84484 ASSAY OF TROPONIN QUANT: CPT

## 2021-03-05 PROCEDURE — 6370000000 HC RX 637 (ALT 250 FOR IP): Performed by: INTERNAL MEDICINE

## 2021-03-05 PROCEDURE — 83880 ASSAY OF NATRIURETIC PEPTIDE: CPT

## 2021-03-05 RX ORDER — NICOTINE 21 MG/24HR
1 PATCH, TRANSDERMAL 24 HOURS TRANSDERMAL DAILY PRN
Status: DISCONTINUED | OUTPATIENT
Start: 2021-03-05 | End: 2021-03-06 | Stop reason: HOSPADM

## 2021-03-05 RX ORDER — FAMOTIDINE 20 MG/1
20 TABLET, FILM COATED ORAL 2 TIMES DAILY
Status: DISCONTINUED | OUTPATIENT
Start: 2021-03-05 | End: 2021-03-06 | Stop reason: HOSPADM

## 2021-03-05 RX ORDER — ASPIRIN 81 MG/1
81 TABLET, CHEWABLE ORAL DAILY
Status: DISCONTINUED | OUTPATIENT
Start: 2021-03-06 | End: 2021-03-06 | Stop reason: HOSPADM

## 2021-03-05 RX ORDER — LISINOPRIL 5 MG/1
5 TABLET ORAL DAILY
Status: DISCONTINUED | OUTPATIENT
Start: 2021-03-06 | End: 2021-03-06 | Stop reason: HOSPADM

## 2021-03-05 RX ORDER — ACETAMINOPHEN 325 MG/1
650 TABLET ORAL EVERY 6 HOURS PRN
Status: DISCONTINUED | OUTPATIENT
Start: 2021-03-05 | End: 2021-03-06 | Stop reason: HOSPADM

## 2021-03-05 RX ORDER — ALBUTEROL SULFATE 90 UG/1
2 AEROSOL, METERED RESPIRATORY (INHALATION) EVERY 6 HOURS PRN
Status: DISCONTINUED | OUTPATIENT
Start: 2021-03-05 | End: 2021-03-06 | Stop reason: HOSPADM

## 2021-03-05 RX ORDER — LABETALOL HYDROCHLORIDE 5 MG/ML
20 INJECTION, SOLUTION INTRAVENOUS EVERY 10 MIN PRN
Status: DISCONTINUED | OUTPATIENT
Start: 2021-03-05 | End: 2021-03-06 | Stop reason: HOSPADM

## 2021-03-05 RX ORDER — ACETAMINOPHEN 650 MG/1
650 SUPPOSITORY RECTAL EVERY 6 HOURS PRN
Status: DISCONTINUED | OUTPATIENT
Start: 2021-03-05 | End: 2021-03-06 | Stop reason: HOSPADM

## 2021-03-05 RX ORDER — SODIUM CHLORIDE 0.9 % (FLUSH) 0.9 %
10 SYRINGE (ML) INJECTION PRN
Status: DISCONTINUED | OUTPATIENT
Start: 2021-03-05 | End: 2021-03-06 | Stop reason: HOSPADM

## 2021-03-05 RX ORDER — METOPROLOL TARTRATE 50 MG/1
50 TABLET, FILM COATED ORAL 2 TIMES DAILY
Status: DISCONTINUED | OUTPATIENT
Start: 2021-03-05 | End: 2021-03-05

## 2021-03-05 RX ORDER — SODIUM CHLORIDE 0.9 % (FLUSH) 0.9 %
10 SYRINGE (ML) INJECTION EVERY 12 HOURS SCHEDULED
Status: DISCONTINUED | OUTPATIENT
Start: 2021-03-05 | End: 2021-03-06 | Stop reason: HOSPADM

## 2021-03-05 RX ORDER — ONDANSETRON 2 MG/ML
4 INJECTION INTRAMUSCULAR; INTRAVENOUS EVERY 6 HOURS PRN
Status: DISCONTINUED | OUTPATIENT
Start: 2021-03-05 | End: 2021-03-06 | Stop reason: HOSPADM

## 2021-03-05 RX ORDER — BUPROPION HYDROCHLORIDE 100 MG/1
100 TABLET, EXTENDED RELEASE ORAL DAILY
Status: DISCONTINUED | OUTPATIENT
Start: 2021-03-06 | End: 2021-03-06 | Stop reason: HOSPADM

## 2021-03-05 RX ORDER — LISINOPRIL 5 MG/1
5 TABLET ORAL DAILY
Status: DISCONTINUED | OUTPATIENT
Start: 2021-03-05 | End: 2021-03-05

## 2021-03-05 RX ORDER — LISINOPRIL 10 MG/1
10 TABLET ORAL DAILY
Status: DISCONTINUED | OUTPATIENT
Start: 2021-03-06 | End: 2021-03-05

## 2021-03-05 RX ORDER — BUPROPION HYDROCHLORIDE 100 MG/1
200 TABLET, EXTENDED RELEASE ORAL 2 TIMES DAILY
Status: DISCONTINUED | OUTPATIENT
Start: 2021-03-05 | End: 2021-03-05

## 2021-03-05 RX ORDER — ASPIRIN 81 MG/1
324 TABLET, CHEWABLE ORAL ONCE
Status: COMPLETED | OUTPATIENT
Start: 2021-03-05 | End: 2021-03-05

## 2021-03-05 RX ORDER — PROMETHAZINE HYDROCHLORIDE 25 MG/1
12.5 TABLET ORAL EVERY 6 HOURS PRN
Status: DISCONTINUED | OUTPATIENT
Start: 2021-03-05 | End: 2021-03-06 | Stop reason: HOSPADM

## 2021-03-05 RX ORDER — LABETALOL HYDROCHLORIDE 5 MG/ML
10 INJECTION, SOLUTION INTRAVENOUS ONCE
Status: COMPLETED | OUTPATIENT
Start: 2021-03-05 | End: 2021-03-05

## 2021-03-05 RX ADMIN — LABETALOL HYDROCHLORIDE 10 MG: 5 INJECTION, SOLUTION INTRAVENOUS at 08:59

## 2021-03-05 RX ADMIN — ACETAMINOPHEN 650 MG: 325 TABLET ORAL at 21:30

## 2021-03-05 RX ADMIN — SODIUM CHLORIDE, PRESERVATIVE FREE 10 ML: 5 INJECTION INTRAVENOUS at 11:19

## 2021-03-05 RX ADMIN — ASPIRIN 324 MG: 81 TABLET, CHEWABLE ORAL at 08:58

## 2021-03-05 RX ADMIN — FAMOTIDINE 20 MG: 20 TABLET ORAL at 21:30

## 2021-03-05 RX ADMIN — ALBUTEROL SULFATE 2 PUFF: 90 AEROSOL, METERED RESPIRATORY (INHALATION) at 09:10

## 2021-03-05 RX ADMIN — ALBUTEROL SULFATE 2 PUFF: 90 AEROSOL, METERED RESPIRATORY (INHALATION) at 15:47

## 2021-03-05 RX ADMIN — LISINOPRIL 5 MG: 5 TABLET ORAL at 11:18

## 2021-03-05 RX ADMIN — METOPROLOL TARTRATE 25 MG: 25 TABLET, FILM COATED ORAL at 21:30

## 2021-03-05 RX ADMIN — SODIUM CHLORIDE, PRESERVATIVE FREE 10 ML: 5 INJECTION INTRAVENOUS at 21:31

## 2021-03-05 RX ADMIN — ENOXAPARIN SODIUM 40 MG: 40 INJECTION SUBCUTANEOUS at 11:19

## 2021-03-05 ASSESSMENT — PAIN DESCRIPTION - PROGRESSION
CLINICAL_PROGRESSION: NOT CHANGED
CLINICAL_PROGRESSION: NOT CHANGED
CLINICAL_PROGRESSION: GRADUALLY WORSENING
CLINICAL_PROGRESSION: NOT CHANGED

## 2021-03-05 ASSESSMENT — PAIN DESCRIPTION - PAIN TYPE
TYPE: ACUTE PAIN
TYPE: ACUTE PAIN

## 2021-03-05 ASSESSMENT — ENCOUNTER SYMPTOMS
BACK PAIN: 0
SORE THROAT: 0
COUGH: 1
PHOTOPHOBIA: 0
RHINORRHEA: 0
ABDOMINAL PAIN: 0
NAUSEA: 1
CONSTIPATION: 0
SHORTNESS OF BREATH: 1
VOMITING: 0

## 2021-03-05 ASSESSMENT — PAIN DESCRIPTION - ONSET
ONSET: SUDDEN
ONSET: SUDDEN

## 2021-03-05 ASSESSMENT — PAIN DESCRIPTION - DESCRIPTORS
DESCRIPTORS: PRESSURE

## 2021-03-05 ASSESSMENT — PAIN DESCRIPTION - ORIENTATION
ORIENTATION: ANTERIOR

## 2021-03-05 ASSESSMENT — PAIN SCALES - GENERAL
PAINLEVEL_OUTOF10: 5
PAINLEVEL_OUTOF10: 5
PAINLEVEL_OUTOF10: 3

## 2021-03-05 ASSESSMENT — PAIN DESCRIPTION - FREQUENCY
FREQUENCY: INTERMITTENT
FREQUENCY: INTERMITTENT

## 2021-03-05 ASSESSMENT — PAIN DESCRIPTION - LOCATION: LOCATION: CHEST

## 2021-03-05 NOTE — PLAN OF CARE
Respiratory in to see patient. Breath sounds improved since MDI given in ED. Room air spo2 94%. No distress noted. Will continue to monitor.

## 2021-03-05 NOTE — ED NOTES
[facility] urine specimen obtained and sent to lab. Pt changed into gown. Khushbu, RRT paged for albuterol administration.      Matheus Lipscomb RN  03/05/21 0150

## 2021-03-05 NOTE — LETTER
[facility] [address] ICU  [address] [facility] [address]  Phone: [phone]    No name on file. March 6, 2021     Patient: Brittni Lepe   YOB: 1965   Date of Visit: 3/5/2021       To Whom It May Concern: It is my medical opinion that Jennifer Russo may return to work on Monday, March 8, 2021. She was in the hospital from 3/5/21-3/6/21. If you have any questions or concerns, please don't hesitate to call. Sincerely,        No name on file.

## 2021-03-05 NOTE — ED PROVIDER NOTES
[address] [facility] ED  [address] [address] [facility] [address]  Phone: [phone]  Fax: [phone]        Pt Name: Igor Green  MRN: [id number]  [place] 1965  Date of evaluation: 3/5/21      CHIEF COMPLAINT     Chief Complaint   Patient presents with    Hypertension     pt c/o elevated bp and chest/abd pressure that started at 0130 today. nausea present without vomiting.  multiple ER visits for same    Chest Pain    Nausea         HISTORY OF PRESENT ILLNESS  (Location/Symptom, Timing/Onset, Context/Setting, Quality, Duration, Modifying Factors, Severity.)    Igor Green is a 50 y.o. female who presents with hypertension. She has been in the ED three times recently for elevated blood pressure. She was given a prescription for metoprolol 25 mg twice daily that was subsequently increased to 50 mg twice daily. She has been taking this without relief and her blood pressure has stayed elevated. She states she has tried to make an appointment with her PCP, but was unable to get into see her for a while. She reports chest pressure, nausea, shortness of breath, diaphoresis. She states she woke up at 1:00 AM feeling so short of breath she couldn't sleep. She denies headache today. She feels generally weak and ill. No nasal congtestion. She does have a mild cough. She states she has been urinating more than usual.  This is her fourth emergency department visit for hypertension. This is the first time she has presented with chest pain. Of note, the patient did have a CT of her head on March 2 which was negative for acute process. Cardiac risk factors include hypertension, smoking, obesity. She does have CVA and CAD history in her parents. She has had 2 ablations in the past for Samson-Parkinson-White. She has had 2 ablation procedures. The patient has no prior history of venous thromboembolism. No recent travel. No recent surgery. No leg swelling. No hemoptysis.   No estrogen containing medications. No history of malignancy. She states she has a known adrenal mass and wonders if her blood pressure could be related to that. REVIEW OF SYSTEMS    (2-9 systems for level 4, 10 or more for level 5)     Review of Systems   Constitutional: Positive for diaphoresis. Negative for activity change, chills and fever. HENT: Negative for congestion, rhinorrhea and sore throat. Eyes: Negative for photophobia and visual disturbance. Respiratory: Positive for cough and shortness of breath. Cardiovascular: Positive for chest pain. Negative for palpitations and leg swelling. Gastrointestinal: Positive for nausea. Negative for abdominal pain, constipation and vomiting. Genitourinary: Negative for dysuria, frequency and urgency. Musculoskeletal: Negative for arthralgias and back pain. Skin: Negative for rash and wound. Neurological: Negative for dizziness, light-headedness and headaches. Hematological: Negative for adenopathy. Does not bruise/bleed easily. PAST MEDICAL HISTORY    has a past medical history of Anxiety, Chronic back pain, Hypertension, Mass of adrenal gland ([address] Obesity, Solitary left kidney, Transient gluten sensitivity, and WPW (Emiliana-Parkinson-White syndrome). SURGICAL HISTORY      has a past surgical history that includes Tonsillectomy; Appendectomy; Arm Surgery; ablation of dysrhythmic focus; Upper gastrointestinal endoscopy (N/A, 2019); and Colonoscopy (N/A, 2019). CURRENTMEDICATIONS       Previous Medications    BUPROPION (WELLBUTRIN SR) 200 MG EXTENDED RELEASE TABLET    Take 200 mg by mouth 2 times daily    METOPROLOL TARTRATE (LOPRESSOR) 25 MG TABLET    Take 1 tablet by mouth 2 times daily       ALLERGIES     is allergic to gluten meal and pcn [penicillins]. FAMILY HISTORY     She indicated that her mother is . She indicated that the status of her father is unknown. She indicated that her sister is alive.      family Behavior: Behavior normal.         Thought Content: Thought content normal.         DIFFERENTIAL DIAGNOSIS/ MDM:     HEART Risk Score for Chest Pain Patients                       Patient Score  History   Highly suspicious2            Moderately suspicious. ..1     = 2    Slightly or non suspicious. 0      ECG   Significant STD. ...2        Nonspecific repolarization1      = 0    Normal (no change from previous). .0      Age   >642      > 45 - <65. 1     = 1   < 46. ..0      Risk Factors  >2 risk factors.2     I - 2 risk factors. .1     = 2  No risk factors. ...0     Troponin   >3times normal limit. ..2      >1 time - <3 times normal limit. 1   = 0    Normal trop. Amaris Chacon 0     -----------------------------------------------------------------------------------------      TOTAL RISK SCORE =  5          RISK % =  20.3%        Risk Factors: DM, smoker (current or recent < mo), HTN, HLP, FHx CAD, obesity,   dsv add-ons   SLE, CKDz, HIV, cocaine abuse    Score 0 - 3 =  2.5% MACE over next 6 wks = Discharge home  Score 4 - 6 =  20.3% MACE over next 6 wks = Obs admit  Score 7 - 10 = 72.7% MACE over next 6 wks = Early invasive Rx        DIAGNOSTIC RESULTS     EKG: All EKG's are interpreted by the Emergency Department Physician who either signs or Co-signs this chart in the [address] a cardiologist.    EKG Interpretation    Interpreted by emergency department physician    Rhythm: normal sinus   Rate: normal  Axis: normal  Ectopy: none  Conduction: normal  ST Segments: no acute change  T Waves: no acute change  Q Waves: none    Clinical Impression: normal EKG    Renate Go      RADIOLOGY:  Non-plain film images such as CT, Ultrasound and MRI are read by the radiologist. Plain radiographic images are visualized and the radiologist interpretations are reviewed as follows:         Interpretation per the Radiologist below, if available at the time of this note:    Ct Head Wo Contrast    Result Date: 3/2/2021  EXAMINATION: CT OF THE HEAD WITHOUT CONTRAST  3/2/2021 1:04 pm TECHNIQUE: CT of the head was performed without the administration of intravenous contrast. Dose modulation, iterative reconstruction, and/or weight based adjustment of the mA/kV was utilized to reduce the radiation dose to as low as reasonably achievable. COMPARISON: November 6, 2019. HISTORY: ORDERING SYSTEM PROVIDED HISTORY: headache htn TECHNOLOGIST PROVIDED HISTORY: headache htn Decision Support Exception->Emergency Medical Condition (MA) Is the patient pregnant?->No Reason for Exam: headache, HTN Acuity: Acute Type of Exam: Initial Additional signs and symptoms: x8 days. no headache hx. FINDINGS: The gyri and sulci have a normal appearance. Localized volume loss within the high right frontoparietal region about the vertex is unchanged. The gray-white matter differentiation is preserved throughout. Ventricles and extra-axial spaces appear normal.  There is no acute hemorrhage, mass or mass effect. No evidence of acute territorial infarct. There is no significant calvarial or skull base osseous abnormality. Mastoid air cells are normally aerated. The visualized paranasal sinuses are clear. No evidence of acute intracranial process. Xr Chest Portable    Result Date: 3/5/2021  EXAMINATION: ONE XRAY VIEW OF THE CHEST 3/5/2021 9:14 am COMPARISON: Chest July 20, 2020. HISTORY: ORDERING SYSTEM PROVIDED HISTORY: chest pain TECHNOLOGIST PROVIDED HISTORY: chest pain Reason for Exam: short of breath Acuity: Acute Type of Exam: Initial FINDINGS: The lungs are without acute focal process. There is no effusion or pneumothorax. The cardiomediastinal silhouette is without acute process. No acute process.        LABS:  Results for orders placed or performed during the hospital encounter of 66/03/52   Basic Metabolic Panel   Result Value Ref Range    Glucose 97 70 - 99 mg/dL    BUN 21 (H) 6 - 20 mg/dL    CREATININE 0.86 0.50 - 0.90 mg/dL    Bun/Cre Ratio NOT REPORTED 9 - 20    Calcium 9.7 8.6 - 10.4 mg/dL    Sodium 142 135 - 144 mmol/L    Potassium 3.8 3.7 - 5.3 mmol/L    Chloride 105 98 - 107 mmol/L    CO2 27 20 - 31 mmol/L    Anion Gap 10 9 - 17 mmol/L    GFR Non-African American >60 >60 mL/min    GFR African American >60 >60 mL/min    GFR Comment          GFR Staging NOT REPORTED    CBC Auto Differential   Result Value Ref Range    WBC 11.9 (H) 3.5 - 11.0 k/uL    RBC 4.89 4.0 - 5.2 m/uL    Hemoglobin 14.2 12.0 - 16.0 g/dL    Hematocrit 43.2 36 - 46 %    MCV 88.3 80 - 100 fL    MCH 29.0 26 - 34 pg    MCHC 32.8 31 - 37 g/dL    RDW 14.5 12.5 - 15.4 %    Platelets 389 325 - 008 k/uL    MPV 9.6 6.0 - 12.0 fL    NRBC Automated NOT REPORTED per 100 WBC    Differential Type NOT REPORTED     Immature Granulocytes NOT REPORTED 0 %    Absolute Immature Granulocyte NOT REPORTED 0.00 - 0.30 k/uL    WBC Morphology NOT REPORTED     RBC Morphology NOT REPORTED     Platelet Estimate NOT REPORTED     Seg Neutrophils 62 36 - 66 %    Lymphocytes 29 24 - 44 %    Monocytes 7 2 - 11 %    Eosinophils % 1 1 - 4 %    Basophils 1 0 - 2 %    Segs Absolute 7.50 1.8 - 7.7 k/uL    Absolute Lymph # 3.40 1.0 - 4.8 k/uL    Absolute Mono # 0.80 0.1 - 1.2 k/uL    Absolute Eos # 0.10 0.0 - 0.4 k/uL    Basophils Absolute 0.10 0.0 - 0.2 k/uL   Troponin   Result Value Ref Range    Troponin, High Sensitivity 7 0 - 14 ng/L    Troponin T NOT REPORTED <0.03 ng/mL    Troponin Interp NOT REPORTED    Urinalysis with Microscopic   Result Value Ref Range    Color, UA YELLOW YELLOW    Turbidity UA CLEAR CLEAR    Glucose, Ur NEGATIVE NEGATIVE    Bilirubin Urine NEGATIVE NEGATIVE    Ketones, Urine NEGATIVE NEGATIVE    Specific Gravity, UA 1.010 1.005 - 1.030    Urine Hgb NEGATIVE NEGATIVE    pH, UA 6.5 5.0 - 8.0    Protein, UA NEGATIVE NEGATIVE    Urobilinogen, Urine Normal Normal    Nitrite, Urine NEGATIVE NEGATIVE    Leukocyte Esterase, Urine NEGATIVE NEGATIVE    Urinalysis Comments NOT REPORTED     -          WBC, UA 0 TO 2 0 - 5 /HPF    RBC, UA 0 TO 2 0 - 2 /HPF    Casts UA NOT REPORTED /LPF    Crystals, UA NOT REPORTED None /HPF    Epithelial Cells UA 2 TO 5 0 - 5 /HPF    Renal Epithelial, UA NOT REPORTED 0 /HPF    Bacteria, UA None None    Mucus, UA NOT REPORTED None    Trichomonas, UA NOT REPORTED None    Amorphous, UA NOT REPORTED None    Other Observations UA NOT REPORTED NOT REQ. Yeast, UA NOT REPORTED None       Troponin negative    EMERGENCY DEPARTMENT COURSE:   Vitals:    Vitals:    03/05/21 0828 03/05/21 0910 03/05/21 0914 03/05/21 0947   BP: (!) 192/136  (!) 165/96 (!) 166/100   Pulse: 76  75 69   Resp:   18 18   SpO2: 97% 96% 98% 97%   Weight:       Height:         -------------------------  BP: (!) 166/100,  , Pulse: 69, Resp: 18      RE-EVALUATION:  Patient resting comfortably. BP responding to labetalol. Agreeable to admission. CONSULTS:  Internal Medicine  Patient accepted for admission by Ramsey Gunderson NP.     PROCEDURES:  None    FINAL IMPRESSION      1. Chest pain, unspecified type    2. Hypertensive urgency          DISPOSITION/PLAN   DISPOSITION Admitted 03/05/2021 09:57:25 AM      CONDITION ON DISPOSITION:   Stable     PATIENT REFERRED TO:  No follow-up provider specified.     DISCHARGE MEDICATIONS:  New Prescriptions    No medications on file       (Please note that portions of this note were completed with a voicerecognition program.  Efforts were made to edit the dictations but occasionally words are mis-transcribed.)    Maddy Ponce MD  Attending Emergency Medicine Physician        Maddy Ponce MD  03/05/21 1910 [facility] Hospital[facility]enee MD  03/05/21 1005

## 2021-03-05 NOTE — ED NOTES
Shipman Marrow, NP paged via [employer] per Dr Hari Rao request.     Ethel Luciano RN  03/05/21 2889

## 2021-03-06 VITALS
WEIGHT: 242.73 LBS | DIASTOLIC BLOOD PRESSURE: 69 MMHG | RESPIRATION RATE: 18 BRPM | HEIGHT: 62 IN | TEMPERATURE: 98 F | SYSTOLIC BLOOD PRESSURE: 167 MMHG | HEART RATE: 62 BPM | BODY MASS INDEX: 44.67 KG/M2 | OXYGEN SATURATION: 97 %

## 2021-03-06 LAB
ALBUMIN SERPL-MCNC: 3.6 G/DL (ref 3.5–5.2)
ALBUMIN/GLOBULIN RATIO: 1.3 (ref 1–2.5)
ALP BLD-CCNC: 76 U/L (ref 35–104)
ALT SERPL-CCNC: 15 U/L (ref 5–33)
AMPHETAMINE SCREEN URINE: NEGATIVE
ANION GAP SERPL CALCULATED.3IONS-SCNC: 9 MMOL/L (ref 9–17)
AST SERPL-CCNC: 12 U/L
BARBITURATE SCREEN URINE: NEGATIVE
BENZODIAZEPINE SCREEN, URINE: NEGATIVE
BILIRUB SERPL-MCNC: 0.31 MG/DL (ref 0.3–1.2)
BUN BLDV-MCNC: 26 MG/DL (ref 6–20)
BUN/CREAT BLD: ABNORMAL (ref 9–20)
BUPRENORPHINE URINE: ABNORMAL
CALCIUM SERPL-MCNC: 9.2 MG/DL (ref 8.6–10.4)
CANNABINOID SCREEN URINE: POSITIVE
CHLORIDE BLD-SCNC: 106 MMOL/L (ref 98–107)
CHOLESTEROL/HDL RATIO: 4.7
CHOLESTEROL: 199 MG/DL
CO2: 28 MMOL/L (ref 20–31)
COCAINE METABOLITE, URINE: NEGATIVE
CORTISOL COLLECTION INFO: NORMAL
CORTISOL: 9.1 UG/DL (ref 2.7–18.4)
CREAT SERPL-MCNC: 0.88 MG/DL (ref 0.5–0.9)
GFR AFRICAN AMERICAN: >60 ML/MIN
GFR NON-AFRICAN AMERICAN: >60 ML/MIN
GFR SERPL CREATININE-BSD FRML MDRD: ABNORMAL ML/MIN/{1.73_M2}
GFR SERPL CREATININE-BSD FRML MDRD: ABNORMAL ML/MIN/{1.73_M2}
GLUCOSE BLD-MCNC: 94 MG/DL (ref 70–99)
HCT VFR BLD CALC: 42.2 % (ref 36–46)
HDLC SERPL-MCNC: 42 MG/DL
HEMOGLOBIN: 13.8 G/DL (ref 12–16)
LDL CHOLESTEROL DIRECT: 99 MG/DL
LDL CHOLESTEROL: ABNORMAL MG/DL (ref 0–130)
MCH RBC QN AUTO: 29 PG (ref 26–34)
MCHC RBC AUTO-ENTMCNC: 32.7 G/DL (ref 31–37)
MCV RBC AUTO: 88.8 FL (ref 80–100)
MDMA URINE: ABNORMAL
METHADONE SCREEN, URINE: NEGATIVE
METHAMPHETAMINE, URINE: ABNORMAL
NRBC AUTOMATED: NORMAL PER 100 WBC
OPIATES, URINE: NEGATIVE
OXYCODONE SCREEN URINE: NEGATIVE
PDW BLD-RTO: 14.6 % (ref 12.5–15.4)
PHENCYCLIDINE, URINE: NEGATIVE
PLATELET # BLD: 194 K/UL (ref 140–450)
PMV BLD AUTO: 9.4 FL (ref 6–12)
POTASSIUM SERPL-SCNC: 4 MMOL/L (ref 3.7–5.3)
PROPOXYPHENE, URINE: ABNORMAL
RBC # BLD: 4.75 M/UL (ref 4–5.2)
SODIUM BLD-SCNC: 143 MMOL/L (ref 135–144)
TEST INFORMATION: ABNORMAL
TOTAL PROTEIN: 6.4 G/DL (ref 6.4–8.3)
TRICYCLIC ANTIDEPRESSANTS, UR: ABNORMAL
TRIGL SERPL-MCNC: 413 MG/DL
VLDLC SERPL CALC-MCNC: ABNORMAL MG/DL (ref 1–30)
WBC # BLD: 8.5 K/UL (ref 3.5–11)

## 2021-03-06 PROCEDURE — 85027 COMPLETE CBC AUTOMATED: CPT

## 2021-03-06 PROCEDURE — 83721 ASSAY OF BLOOD LIPOPROTEIN: CPT

## 2021-03-06 PROCEDURE — 80053 COMPREHEN METABOLIC PANEL: CPT

## 2021-03-06 PROCEDURE — 80307 DRUG TEST PRSMV CHEM ANLYZR: CPT

## 2021-03-06 PROCEDURE — 80061 LIPID PANEL: CPT

## 2021-03-06 PROCEDURE — 2500000003 HC RX 250 WO HCPCS: Performed by: NURSE PRACTITIONER

## 2021-03-06 PROCEDURE — 6360000002 HC RX W HCPCS: Performed by: NURSE PRACTITIONER

## 2021-03-06 PROCEDURE — 83835 ASSAY OF METANEPHRINES: CPT

## 2021-03-06 PROCEDURE — 82533 TOTAL CORTISOL: CPT

## 2021-03-06 PROCEDURE — 94640 AIRWAY INHALATION TREATMENT: CPT

## 2021-03-06 PROCEDURE — 99232 SBSQ HOSP IP/OBS MODERATE 35: CPT | Performed by: INTERNAL MEDICINE

## 2021-03-06 PROCEDURE — 94760 N-INVAS EAR/PLS OXIMETRY 1: CPT

## 2021-03-06 PROCEDURE — 6370000000 HC RX 637 (ALT 250 FOR IP): Performed by: INTERNAL MEDICINE

## 2021-03-06 PROCEDURE — 36415 COLL VENOUS BLD VENIPUNCTURE: CPT

## 2021-03-06 PROCEDURE — 2580000003 HC RX 258: Performed by: NURSE PRACTITIONER

## 2021-03-06 RX ORDER — METOPROLOL TARTRATE 50 MG/1
50 TABLET, FILM COATED ORAL 2 TIMES DAILY
Status: DISCONTINUED | OUTPATIENT
Start: 2021-03-06 | End: 2021-03-06 | Stop reason: HOSPADM

## 2021-03-06 RX ORDER — LISINOPRIL 10 MG/1
10 TABLET ORAL DAILY
Qty: 30 TABLET | Refills: 1 | Status: SHIPPED | OUTPATIENT
Start: 2021-03-07 | End: 2021-03-17 | Stop reason: SDUPTHER

## 2021-03-06 RX ORDER — ALBUTEROL SULFATE 90 UG/1
2 AEROSOL, METERED RESPIRATORY (INHALATION) EVERY 6 HOURS PRN
Qty: 1 INHALER | Refills: 1 | Status: SHIPPED | OUTPATIENT
Start: 2021-03-06 | End: 2021-03-17 | Stop reason: SDUPTHER

## 2021-03-06 RX ORDER — METOPROLOL TARTRATE 50 MG/1
50 TABLET, FILM COATED ORAL 2 TIMES DAILY
Qty: 60 TABLET | Refills: 1 | Status: SHIPPED | OUTPATIENT
Start: 2021-03-06 | End: 2021-03-17 | Stop reason: SDUPTHER

## 2021-03-06 RX ORDER — LISINOPRIL 5 MG/1
5 TABLET ORAL ONCE
Status: COMPLETED | OUTPATIENT
Start: 2021-03-06 | End: 2021-03-06

## 2021-03-06 RX ORDER — BUPROPION HYDROCHLORIDE 100 MG/1
100 TABLET, EXTENDED RELEASE ORAL DAILY
Qty: 30 TABLET | Refills: 1 | Status: SHIPPED | OUTPATIENT
Start: 2021-03-07 | End: 2021-03-17 | Stop reason: SDUPTHER

## 2021-03-06 RX ADMIN — BUPROPION HYDROCHLORIDE 100 MG: 100 TABLET, FILM COATED, EXTENDED RELEASE ORAL at 08:51

## 2021-03-06 RX ADMIN — METOPROLOL TARTRATE 25 MG: 25 TABLET, FILM COATED ORAL at 08:51

## 2021-03-06 RX ADMIN — FAMOTIDINE 20 MG: 20 TABLET ORAL at 08:51

## 2021-03-06 RX ADMIN — ALBUTEROL SULFATE 2 PUFF: 90 AEROSOL, METERED RESPIRATORY (INHALATION) at 08:51

## 2021-03-06 RX ADMIN — ASPIRIN 81 MG: 81 TABLET, CHEWABLE ORAL at 08:51

## 2021-03-06 RX ADMIN — Medication 20 MG: at 05:03

## 2021-03-06 RX ADMIN — METOPROLOL TARTRATE 25 MG: 25 TABLET, FILM COATED ORAL at 12:43

## 2021-03-06 RX ADMIN — LISINOPRIL 5 MG: 5 TABLET ORAL at 11:09

## 2021-03-06 RX ADMIN — SODIUM CHLORIDE, PRESERVATIVE FREE 10 ML: 5 INJECTION INTRAVENOUS at 08:30

## 2021-03-06 RX ADMIN — LISINOPRIL 5 MG: 5 TABLET ORAL at 08:51

## 2021-03-06 ASSESSMENT — PAIN SCALES - GENERAL
PAINLEVEL_OUTOF10: 0
PAINLEVEL_OUTOF10: 0

## 2021-03-06 NOTE — DISCHARGE INSTR - DIET

## 2021-03-06 NOTE — CARE COORDINATION
Patient states she does not need her MDI medication for breathing at this time.     Kalin Adames RRT

## 2021-03-06 NOTE — DISCHARGE SUMMARY
[facility]  Office: [phone]  Pastor Elias, DO, Mila Sadler, DO, Flaquita Serrano, DO, Jose R Farrar Blood, DO, Reanna Banegas MD, Melissa Gomes MD, Jin Redd MD, Franklin García MD, Heather Feldman MD, Russel Morgan MD, Alberto Ambrosio MD, Kia Edwards MD, Elaine Zazueta MD, Sri Pope DO, Coni Tenorio MD, Shayla Dixon DO, Asia Borja MD,  Amanda Tiwari DO, Julia Woods MD, Venancio Fair MD, Zeeshan Los, [facility], [facility], [facility], Amada Villareal, CNP, Andre Duval, CNS, Jarvis Ram, CNP, Marcello Woods, CNP, Arron Gayle, CNP, Savanna Lujan, CNP, Gregory Sales, CNP, Daphne Lindquist PA-C, Cherelle Landa, [facility], Trevon Fallon, CNP, Armin Johnson, CNP, Kaylee Watson, CNP, Betty Cabrales, CNP, Chelsi Arzate, CNP, Valdemar Slater [phone]    Discharge Summary     Patient ID: Stephen Tsai  :  1965   MRN: [id number]     ACCOUNT:  [de-identified]   Patient's PCP: DK Juares CNP  Admit Date: 3/5/2021   Discharge Date: 3/6/2021     Length of Stay: 1  Code Status:  Full Code  Admitting Physician: Van Zhang MD  Discharge Physician: Van Zhang MD     Active Discharge Diagnoses:     Hospital Problem Lists:  Principal Problem:    Hypertensive urgency  Active Problems:    Essential hypertension    Hyperlipidemia, mixed    Obesity (BMI 35.0-39.9 without comorbidity)    Adrenal adenoma    Current smoker    Chest pain    Depression  Resolved Problems:    * No resolved hospital problems. *      Admission Condition:  poor     Discharged Condition: good    Hospital Stay:     Hospital Course:  Stephen Tsai is a 50 y.o. female who was admitted for the management of  Hypertensive urgency , presented to ER with *Hypertension (pt c/o elevated bp and chest/abd pressure that started at 0130 today.   nausea present without vomiting.  multiple ER visits for same), Chest Pain, and Nausea      Patient is a pleasant 51-year-old  female with PMH of hypertension, hyperlipidemia, obesity who presented to the emergency department with elevated blood pressure and chest pain.  The patient states that she has had issues with her blood pressure over the last several weeks, necessitating 3 ED visits.  She stopped seeing her primary care doctor recently.  Emergency department gave her prescription for metoprolol 25 mg p.o. twice daily.        However she woke up last night around 1 AM short of breath.  Then she got up in the morning and got ready for work, took her BP at 6:30 AM and it was 211/100.  She took her morning medications including the metoprolol and went to work. Osvaldo Erickson complained of midsternal chest pressure and tingling down her spine and took her blood pressure again which was still elevated so she opted to go to the emergency department.  Patient denies any sore throat, rhinorrhea, or cough.  She does smoke a half a pack of cigarettes daily. Patient given IV Labetolol in the ED and BP improved. Her BB was resumed and Lisinopril 10mg started. A/ Plan    1. Hypertensive urgency-patient given IV labetalol in the ED, Increase lisinopril 10 mg daily started, resume metoprolol 50 mg p.o. twice daily, We will check catecholamines, cortisol, since patient has history of adrenal adenoma. Echo reveals: normal LV function, EF> 55%, mild LVH  2. Chest pain-no EKG changes, troponin x2 negative , given aspirin, check lipids, consider outpatient stress test  3. Adrenal adenoma-check catecholamines, metanephrines, needs imaging follow-up every 6 months to a year- have pt f/u with PCP for this  4. Tobacco abuse-encourage cessation, nicotine patch as needed  5. Depression, anxiety-resume Wellbutrin 100 mg p.o. daily,  Helps her quit smoking too  6. Obesity-encourage diet and exercise once feeling better, may benefit from a sleep study outpatient  7.  GI/DVT prophylaxis    Patient is discharged home in stable · medication strength  · how much to take           Where to Get Your Medications      You can get these medications from any pharmacy    Bring a paper prescription for each of these medications  · albuterol sulfate  (90 Base) MCG/ACT inhaler  · buPROPion 100 MG extended release tablet  · lisinopril 10 MG tablet  · metoprolol tartrate 50 MG tablet         No discharge procedures on file. Time Spent on discharge is  20 mins in patient examination, evaluation, counseling as well as medication reconciliation, prescriptions for required medications, discharge plan and follow up. Electronically signed by   Lashonda Vernon MD  3/6/2021  12:45 PM      Thank you Dr. Ishmael Aburto, APRN - CNP for the opportunity to be involved in this patient's care.

## 2021-03-06 NOTE — DISCHARGE INSTR - COC
Continuity of Care Form    Patient Name: Hi Organ   :  1965  MRN:  [id number]    Admit date:  3/5/2021  Discharge date:  ***    Code Status Order: Full Code   Advance Directives:   Advance Care Flowsheet Documentation     Date/Time Healthcare Directive Type of Healthcare Directive Copy in [address] Agent's Name Healthcare Agent's Phone Number    21 1929  No, patient does not have an advance directive for healthcare treatment Simultaneous filing. User may not have seen previous data. -- -- -- -- --          Admitting Physician:  Devan Amato MD  PCP: DK Johnston CNP    Discharging Nurse: [facility] Unit/Room#: 329/329-01  Discharging Unit Phone Number: ***    Emergency Contact:   Extended Emergency Contact Information  Primary Emergency Contact: Vy Figueroa [address] Phone: [phone]  Relation: None    Past Surgical History:  Past Surgical History:   Procedure Laterality Date    ABLATION OF DYSRHYTHMIC FOCUS      x 2-3 due to WPW    APPENDECTOMY      ARM SURGERY      COLONOSCOPY N/A 2019    COLONOSCOPY POLYPECTOMY SNARE/COLD BIOPSY performed by Brook Wolff MD at [facility] N/A 2019    EGD BIOPSY performed by Brook Wolff MD at [address] [place] [address]       Immunization History: There is no immunization history on file for this patient.     Active Problems:  Patient Active Problem List   Diagnosis Code    Essential hypertension I10    Hyperlipidemia, mixed E78.2    Hypothyroidism, adult Y07.3    Nonalcoholic fatty liver disease without nonalcoholic steatohepatitis (LEIVA) K76.0    Obesity (BMI 35.0-39.9 without comorbidity) E66.9    Vasomotor symptoms due to menopause N95.1    Adrenal adenoma D35.00    Diarrhea R19.7    Rectal bleeding K62.5    Nausea R11.0    Transient gluten sensitivity K90.0    Current smoker F17.200    Lumbago with sciatica M54.40    Palpitations R00.2    Postural dizziness with near syncope R42, R55    WPW (Emiliana-Parkinson-White syndrome) I45.6    Hypertensive urgency I16.0    Chest pain R07.9    Depression F32.9       Isolation/Infection:   Isolation          No Isolation        Unreconciled Outside Infections     Enable clinical decision support by reconciling outside information with the patient's chart.     .    Infection Onset Last Indicated Last Received Source    MRSA 11/13/15 11/13/15 03/02/21 [facility]      Patient Infection Status     None to display          Nurse Assessment:  Last Vital Signs: BP (!) 167/69   Pulse 62   Temp 98 °F (36.7 °C) (Oral)   Resp 18   Ht 5' 2\" (1.575 m)   Wt 242 lb 11.6 oz (110.1 kg)   SpO2 97%   BMI 44.40 kg/m²     Last documented pain score (0-10 scale): Pain Level: 0  Last Weight:   Wt Readings from Last 1 Encounters:   03/06/21 242 lb 11.6 oz (110.1 kg)     Mental Status:  {IP PT MENTAL STATUS:20030}    IV Access:  { JASIEL IV ACCESS:809112899}    Nursing Mobility/ADLs:  Walking   {CHP DME AOLH:308454587}  Transfer  {P DME FUWE:472655838}  Bathing  {CHP DME JBYD:782390286}  Dressing  {CHP DME MHLE:340321527}  Toileting  {CHP DME IGQN:466661423}  Feeding  {[facility] DME UXWZ:355904094}  Med Admin  {[facility] DME FAAT:193211061}  Med Delivery   {[facility] MED Delivery:649596052}    Wound Care Documentation and Therapy:        Elimination:  Continence:   · Bowel: {YES / AP:10661}  · Bladder: {YES / ZB:01471}  Urinary Catheter: {Urinary Catheter:332125013}   Colostomy/Ileostomy/Ileal Conduit: {YES / TK:43936}       Date of Last BM: ***    Intake/Output Summary (Last 24 hours) at 3/6/2021 1706  Last data filed at 3/6/2021 0508  Gross per 24 hour   Intake --   Output 100 ml   Net -100 ml     I/O last 3 completed shifts:  In: -   Out: 100 [Urine:100]    Safety Concerns:     [address] Safety Concerns:977770892}    Impairments/Disabilities:      [address] Impairments/Disabilities:564772332}    Nutrition Therapy:  Current Nutrition Therapy:   [address] JASIEL Diet List:320156880}    Routes of Feeding: {P DME Other Feedings:329468734}  Liquids: {Slp liquid thickness:95196}  Daily Fluid Restriction: {CHP DME Yes amt example:070378215}  Last Modified Barium Swallow with Video (Video Swallowing Test): {Done Not Done VAYZ:038501840}    Treatments at the Time of Hospital Discharge:   Respiratory Treatments: ***  Oxygen Therapy:  {Therapy; copd oxygen:21096}  Ventilator:    {[facility] Vent ZRUT:418500129}    Rehab Therapies: {THERAPEUTIC INTERVENTION:6526551075}  Weight Bearing Status/Restrictions: {[facility] Weight Bearin}  Other Medical Equipment (for information only, NOT a DME order):  {EQUIPMENT:327794702}  Other Treatments: ***    Patient's personal belongings (please select all that are sent with patient):  {[facility] DME Belongings:407455497}    RN SIGNATURE:  {Esignature:168158475}    CASE MANAGEMENT/SOCIAL WORK SECTION    Inpatient Status Date: ***    Readmission Risk Assessment Score:  Readmission Risk              Risk of Unplanned Readmission:        13           Discharging to Facility/ Agency   · Name:   · Address:  · Phone:  · Fax:    Dialysis Facility (if applicable)   · Name:  · Address:  · Dialysis Schedule:  · Phone:  · Fax:    / signature: {Esignature:106885275}    PHYSICIAN SECTION    Prognosis: {Prognosis:7032643761}    Condition at Discharge: [address] Patient Condition:067075509}    Rehab Potential (if transferring to Rehab): {Prognosis:5033174974}    Recommended Labs or Other Treatments After Discharge: ***    Physician Certification: I certify the above information and transfer of Regina Rudolph  is necessary for the continuing treatment of the diagnosis listed and that she requires {Admit to Appropriate Level of Care:69555} for {GREATER/LESS:281277264} 30 days.      Update Admission H&P: {[facility] DME Changes in KLRBI:815552300}    PHYSICIAN

## 2021-03-06 NOTE — PROGRESS NOTES
[facility]  Office: [address], DO, [place] Renee, DO, Naz Arias, DO, Itz Wong, DO, Fawn Caba MD, Silas Claudio MD, Mago Collins MD, María Rodriguez MD, Lindsay Turcios MD, Benton Menjivar MD, Kia Shepard MD, Booker Philippe MD, Elaine Marie MD, Azra Huynh, DO, Elif Jimenez MD, Mario Alberto Berrios DO, Gorge Juares MD,  Sofia Gill DO, Yas Ventura MD, Aba Pink MD, Anny Myles [facility], [facility] Maria Alejandra, CNP, Pankaj Drummond, CNP, Binu Borden, CNS, Bossman Dominguez, CNP, Thompson Lombard, CNP, Mirella Crowley, CNP, Stuart Johnson, CNP, Nyasia Bello, CNP, Abdoulaye Mendoza PA-C, Roula Steiner, [facility], Luis Miramontes, CNP, Silverio Solorio, CNP, Adrianne Javon, CNP, Andres Sanchez, CNP, Douglas Love, CNP, Zay Winkler, [address]    Progress Note    3/6/2021    12:11 PM    Name:   Giovanni Quiles  MRN:     [id number]     Acct:      [de-identified]   Room:   [facility]329-01   Day:  1  Admit Date:  3/5/2021  8:09 AM    PCP:   DK Scanlon CNP  Code Status:  Full Code    Subjective:     C/C:   Chief Complaint   Patient presents with    Hypertension     pt c/o elevated bp and chest/abd pressure that started at 0130 today. nausea present without vomiting.  multiple ER visits for same    Chest Pain    Nausea     Interval History Status: improved. Patient's BP is much better 140/80. No more chest pain. She did have a slight HA this morning, but improved. Brief History:     Giovanni Quiles is a 50 y.o. / female who presents with Hypertension (pt c/o elevated bp and chest/abd pressure that started at 0130 today.   nausea present without vomiting.  multiple ER visits for same), Chest Pain, and Nausea   and is admitted to the hospital for the management of Hypertensive urgency.     Patient is a pleasant 53-year-old  female with PMH of hypertension, hyperlipidemia, obesity who presented to the emergency department with elevated blood pressure and chest pain. The patient states that she has had issues with her blood pressure over the last several weeks, necessitating 3 ED visits. She stopped seeing her primary care doctor recently. Emergency department gave her prescription for metoprolol 25 mg p.o. twice daily.          However she woke up last night around 1 AM short of breath. Then she got up in the morning and got ready for work, took her BP at 6:30 AM and it was 211/100. She took her morning medications including the metoprolol and went to work. She complained of midsternal chest pressure and tingling down her spine and took her blood pressure again which was still elevated so she opted to go to the emergency department. Patient denies any sore throat, rhinorrhea, or cough. She does smoke a half a pack of cigarettes daily. Review of Systems:     Constitutional:  negative for chills, fevers, sweats  Respiratory:  negative for cough, dyspnea on exertion, shortness of breath, wheezing  Cardiovascular:  negative for chest pain, chest pressure/discomfort, lower extremity edema, palpitations  Gastrointestinal:  negative for abdominal pain, constipation, diarrhea, nausea, vomiting  Neurological:  negative for dizziness, + headache    Medications: Allergies:     Allergies   Allergen Reactions    Gluten Meal      Abdominal pain    Pcn [Penicillins] Hives       Current Meds:   Scheduled Meds:    sodium chloride flush  10 mL Intravenous 2 times per day    enoxaparin  40 mg Subcutaneous Daily    famotidine  20 mg Oral BID    buPROPion  100 mg Oral Daily    aspirin  81 mg Oral Daily    metoprolol tartrate  25 mg Oral BID    lisinopril  5 mg Oral Daily     Continuous Infusions:   PRN Meds: albuterol sulfate HFA, sodium chloride flush, nicotine, promethazine **OR** ondansetron, acetaminophen **OR** acetaminophen, perflutren lipid microspheres, labetalol    Data:     Past Medical History:   has a past medical history of Anxiety, Chronic back pain, Depression, Hypertension, Mass of adrenal gland ([address] Obesity, Solitary left kidney, Transient gluten sensitivity, and WPW (Emiliana-Parkinson-White syndrome). Social History:   reports that she has been smoking cigarettes. She has been smoking about 0.50 packs per day. She has never used smokeless tobacco. She reports current drug use. Frequency: 7.00 times per week. Drug: Marijuana. She reports that she does not drink alcohol. Family History:   Family History   Problem Relation Age of Onset    Stroke Mother     Cancer Mother         bladder    Diabetes Father     Diabetes Sister     Ovarian Cancer Sister        Vitals:  BP (!) 142/85   Pulse 68   Temp 98.1 °F (36.7 °C) (Oral)   Resp 18   Ht 5' 2\" (1.575 m)   Wt 242 lb 11.6 oz (110.1 kg)   SpO2 95%   BMI 44.40 kg/m²   Temp (24hrs), Av °F (36.7 °C), Min:97.6 °F (36.4 °C), Max:98.5 °F (36.9 °C)    No results for input(s): POCGLU in the last 72 hours. I/O (24Hr):     Intake/Output Summary (Last 24 hours) at 3/6/2021 1211  Last data filed at 3/6/2021 0508  Gross per 24 hour   Intake --   Output 100 ml   Net -100 ml       Labs:  Hematology:  Recent Labs     21  0825 21  0523   WBC 11.9* 8.5   RBC 4.89 4.75   HGB 14.2 13.8   HCT 43.2 42.2   MCV 88.3 88.8   MCH 29.0 29.0   MCHC 32.8 32.7   RDW 14.5 14.6    194   MPV 9.6 9.4     Chemistry:  Recent Labs     21  0825 21  1032 21  1541 21  0523     --   --  143   K 3.8  --   --  4.0     --   --  106   CO2 27  --   --  28   GLUCOSE 97  --   --  94   BUN 21*  --   --  26*   CREATININE 0.86  --   --  0.88   ANIONGAP 10  --   --  9   LABGLOM >60  --   --  >60   GFRAA >60  --   --  >60   CALCIUM 9.7  --   --  9.2   PROBNP  --  403*  --   --    TROPHS 7 13 12  --      Recent Labs     21  0523   PROT 6.4   LABALBU 3.6   AST 12   ALT 15   ALKPHOS 76   BILITOT 0.31     ABG:No

## 2021-03-06 NOTE — PLAN OF CARE
Problem: Pain:  Description: Pain management should include both nonpharmacologic and pharmacologic interventions. Goal: Pain level will decrease  Description: Pain level will decrease  3/6/2021 0031 by Harper Mike  Outcome: Ongoing  Goal: Control of acute pain  Description: Control of acute pain  3/6/2021 0031 by Harper Mike  Outcome: Ongoing  Goal: Control of chronic pain  Description: Control of chronic pain  3/6/2021 0031 by Harper Mike  Outcome: Ongoing    : Will assess for pain throughout shift and administer pain medication as needed     Problem: Falls - Risk of:  Goal: Will remain free from falls  Description: Will remain free from falls  3/6/2021 0031 by Harper Mike  Outcome: Ongoing  Goal: Absence of physical injury  Description: Absence of physical injury  3/6/2021 0031 by Harper Mike  Outcome: Ongoing    : Siderails up x 2  Hourly rounding  Call light in reach  Instructed to call for assist before attempting out of bed. Remains free from falls and accidental injury at this time   Floor free from obstacles  Bed is locked and in lowest position  Adequate lighting provided    Problem: Respiratory:  Goal: Ability to maintain a clear airway will improve  Description: Ability to maintain a clear airway will improve  Outcome: Ongoing    : Will monitor SpO2, lung sounds, and will notify RT when necessary.     : Patient is in bed with no needs at this time. Call light is within reach.  Will continue to monitor and assess hourly/PRN

## 2021-03-06 NOTE — PROGRESS NOTES
Patient is cleared for discharge pending ECHO results. Dr. Zuñiga Older called and updated on plan. He will read the ECHO this afternoon.

## 2021-03-06 NOTE — H&P
[facility]  Office: [address], DO, Asimtahir Cardoso, DO, Johnnisha Randhawa, DO, Eufemia Fragoso Blood, DO, Henrik Dodge MD, Star Taylor MD, Chadwick Grace MD, Bella Lang MD, Estefania Koch MD, Camron Schwab MD, Alfredo Reynolds MD, Kathleen Cruz MD, Elaine Mccain MD, Erasmo Leavitt, DO, Brando Callejas MD, Ambreen Yoon, DO, Angelo Hsu MD,  Thor Uriarte, DO, Sarina Murray MD, Nolan Jovel MD, Phuc Montgomery, [facility], [facility], CNP, Luiza Mcintyre, CNP, Rhonda Orellana, CNS, Galen Sandoval, CNP, Vannie Epley, CNP, Polly Montanez, CNP, Olesya Lo, CNP, Liat Muller, CNP, Jae Slaughter PA-C, Claire Calles, [facility], Eric Knapp, CNP, Linnea Mercer, CNP, Lindsay Breaux, CNP, Christopher Hale, CNP, Alejandro Montes De Oca, CNP, Daniel Rico, CNP         [facility]   [address] [facility]    HISTORY AND PHYSICAL EXAMINATION            Date:   3/5/2021  Patient name:  Regi Winters  Date of admission:  3/5/2021  8:09 AM  MRN:   [id number]  Account:  [de-identified]  YOB: 1965  PCP:    DK Herrera CNP  Room:   329/329-01  Code Status:    Full Code    Chief Complaint:     Chief Complaint   Patient presents with    Hypertension     pt c/o elevated bp and chest/abd pressure that started at 0130 today. nausea present without vomiting.  multiple ER visits for same    Chest Pain    Nausea       History Obtained From:     patient, electronic medical record    History of Present Illness:     Reig Winters is a 50 y.o. / female who presents with Hypertension (pt c/o elevated bp and chest/abd pressure that started at 0130 today. nausea present without vomiting.  multiple ER visits for same), Chest Pain, and Nausea   and is admitted to the hospital for the management of Hypertensive urgency.     Patient is a pleasant 59-year-old  female with PMH of hypertension, hyperlipidemia, obesity who presented to the emergency department with elevated blood pressure and chest pain. The patient states that she has had issues with her blood pressure over the last several weeks, necessitating 3 ED visits. She stopped seeing her primary care doctor recently. Emergency department gave her prescription for metoprolol 25 mg p.o. twice daily. However she woke up last night around 1 AM short of breath. Then she got up in the morning and got ready for work, took her BP at 6:30 AM and it was 211/100. She took her morning medications including the metoprolol and went to work. She complained of midsternal chest pressure and tingling down her spine and took her blood pressure again which was still elevated so she opted to go to the emergency department. Patient denies any sore throat, rhinorrhea, or cough. She does smoke a half a pack of cigarettes daily. Past Medical History:     Past Medical History:   Diagnosis Date    Anxiety     Chronic back pain     Depression     Hypertension     Mass of adrenal gland ([address]     Obesity     Solitary left kidney     Transient gluten sensitivity 02/21/2019    WPW (Emiliana-Parkinson-White syndrome) 2009        Past Surgical History:     Past Surgical History:   Procedure Laterality Date    ABLATION OF DYSRHYTHMIC FOCUS      x 2-3 due to WPW    APPENDECTOMY      ARM SURGERY      COLONOSCOPY N/A 2/21/2019    COLONOSCOPY POLYPECTOMY SNARE/COLD BIOPSY performed by Brook Wolff MD at [address] N/A 2/21/2019    EGD BIOPSY performed by Brook Wolff MD at [address]        Medications Prior to Admission:     Prior to Admission medications    Medication Sig Start Date End Date Taking?  Authorizing Provider   metoprolol tartrate (LOPRESSOR) 25 MG tablet Take 1 tablet by mouth 2 times daily  Patient taking differently: Take 50 mg by mouth 2 times daily  3/2/21  Yes Nicolasa Wren DO        Allergies:     Gluten meal and Pcn [penicillins]    Social History:     Tobacco:    reports that she has been smoking cigarettes. She has been smoking about 0.50 packs per day. She has never used smokeless tobacco.  Alcohol:      reports no history of alcohol use. Drug Use:  reports current drug use. Frequency: 7.00 times per week. Drug: Marijuana. Family History:     Family History   Problem Relation Age of Onset    Stroke Mother     Cancer Mother         bladder    Diabetes Father     Diabetes Sister     Ovarian Cancer Sister        Review of Systems:     Positive and Negative as described in HPI. CONSTITUTIONAL:  negative for fevers, chills, sweats, fatigue, weight loss  HEENT:  negative for vision, hearing changes, runny nose, throat pain  RESPIRATORY: Positive for shortness of breath, no cough, congestion, wheezing  CARDIOVASCULAR: Positive for chest pain, no palpitations  GASTROINTESTINAL:  negative for , vomiting, diarrhea, constipation, change in bowel habits, abdominal pain, + nausea  GENITOURINARY:  negative for difficulty of urination, burning with urination, frequency   INTEGUMENT:  negative for rash, skin lesions, easy bruising   HEMATOLOGIC/LYMPHATIC:  negative for swelling/edema   ALLERGIC/IMMUNOLOGIC:  negative for urticaria , itching  ENDOCRINE:  negative increase in drinking, increase in urination, hot or cold intolerance  MUSCULOSKELETAL:  negative joint pains, muscle aches, swelling of joints  NEUROLOGICAL:  negative for , dizziness, lightheadedness, numbness, pain, tingling extremities, +headaches  BEHAVIOR/PSYCH:  + for depression, anxiety    Physical Exam:   /80   Pulse 68   Temp 98.3 °F (36.8 °C) (Oral)   Resp 18   Ht 5' 2\" (1.575 m)   Wt 240 lb 11.9 oz (109.2 kg)   SpO2 97%   BMI 44.03 kg/m²   Temp (24hrs), Av.1 °F (36.7 °C), Min:97.7 °F (36.5 °C), Max:98.4 °F (36.9 °C)    No results for input(s): POCGLU in the last 72 hours.   No intake or output data in the 24 hours ending 21 2213    General Appearance:  alert, well appearing, and in no acute distress  Mental status: oriented to person, place, and time  Head:  normocephalic, atraumatic  Eye: no icterus, redness, pupils equal and reactive, extraocular eye movements intact, conjunctiva clear  Ear: normal external ear, no discharge, hearing intact  Nose:  no drainage noted  Mouth: mucous membranes moist  Neck: supple, no carotid bruits, thyroid not palpable  Lungs: Bilateral equal air entry, clear to ausculation, no wheezing, rales or rhonchi, normal effort  Cardiovascular: normal rate, regular rhythm, no murmur, gallop, rub  Abdomen: Soft, nontender, obese, normal bowel sounds, no hepatomegaly or splenomegaly  Neurologic: There are no new focal motor or sensory deficits, normal muscle tone and bulk, no abnormal sensation, normal speech, cranial nerves II through XII grossly intact  Skin: No gross lesions, rashes, bruising or bleeding on exposed skin area  Extremities:  peripheral pulses palpable, no pedal edema or calf pain with palpation  Psych: normal affect     Investigations:      Laboratory Testing:  Recent Results (from the past 24 hour(s))   EKG 12 Lead    Collection Time: 03/05/21  8:18 AM   Result Value Ref Range    Ventricular Rate 75 BPM    Atrial Rate 75 BPM    P-R Interval 140 ms    QRS Duration 98 ms    Q-T Interval 398 ms    QTc Calculation (Bazett) 444 ms    P Axis 58 degrees    R Axis 43 degrees    T Axis 40 degrees   Basic Metabolic Panel    Collection Time: 03/05/21  8:25 AM   Result Value Ref Range    Glucose 97 70 - 99 mg/dL    BUN 21 (H) 6 - 20 mg/dL    CREATININE 0.86 0.50 - 0.90 mg/dL    Bun/Cre Ratio NOT REPORTED 9 - 20    Calcium 9.7 8.6 - 10.4 mg/dL    Sodium 142 135 - 144 mmol/L    Potassium 3.8 3.7 - 5.3 mmol/L    Chloride 105 98 - 107 mmol/L    CO2 27 20 - 31 mmol/L    Anion Gap 10 9 - 17 mmol/L    GFR Non-African American >60 >60 mL/min    GFR African American >60 >60 mL/min    GFR Comment          GFR Staging NOT REPORTED    CBC Auto Differential    Collection Time: 03/05/21  8:25 AM   Result Value Ref Range    WBC 11.9 (H) 3.5 - 11.0 k/uL    RBC 4.89 4.0 - 5.2 m/uL    Hemoglobin 14.2 12.0 - 16.0 g/dL    Hematocrit 43.2 36 - 46 %    MCV 88.3 80 - 100 fL    MCH 29.0 26 - 34 pg    MCHC 32.8 31 - 37 g/dL    RDW 14.5 12.5 - 15.4 %    Platelets 556 749 - 018 k/uL    MPV 9.6 6.0 - 12.0 fL    NRBC Automated NOT REPORTED per 100 WBC    Differential Type NOT REPORTED     Immature Granulocytes NOT REPORTED 0 %    Absolute Immature Granulocyte NOT REPORTED 0.00 - 0.30 k/uL    WBC Morphology NOT REPORTED     RBC Morphology NOT REPORTED     Platelet Estimate NOT REPORTED     Seg Neutrophils 62 36 - 66 %    Lymphocytes 29 24 - 44 %    Monocytes 7 2 - 11 %    Eosinophils % 1 1 - 4 %    Basophils 1 0 - 2 %    Segs Absolute 7.50 1.8 - 7.7 k/uL    Absolute Lymph # 3.40 1.0 - 4.8 k/uL    Absolute Mono # 0.80 0.1 - 1.2 k/uL    Absolute Eos # 0.10 0.0 - 0.4 k/uL    Basophils Absolute 0.10 0.0 - 0.2 k/uL   Troponin    Collection Time: 03/05/21  8:25 AM   Result Value Ref Range    Troponin, High Sensitivity 7 0 - 14 ng/L    Troponin T NOT REPORTED <0.03 ng/mL    Troponin Interp NOT REPORTED    Urinalysis with Microscopic    Collection Time: 03/05/21  9:05 AM   Result Value Ref Range    Color, UA YELLOW YELLOW    Turbidity UA CLEAR CLEAR    Glucose, Ur NEGATIVE NEGATIVE    Bilirubin Urine NEGATIVE NEGATIVE    Ketones, Urine NEGATIVE NEGATIVE    Specific Gravity, UA 1.010 1.005 - 1.030    Urine Hgb NEGATIVE NEGATIVE    pH, UA 6.5 5.0 - 8.0    Protein, UA NEGATIVE NEGATIVE    Urobilinogen, Urine Normal Normal    Nitrite, Urine NEGATIVE NEGATIVE    Leukocyte Esterase, Urine NEGATIVE NEGATIVE    Urinalysis Comments NOT REPORTED     -          WBC, UA 0 TO 2 0 - 5 /HPF    RBC, UA 0 TO 2 0 - 2 /HPF    Casts UA NOT REPORTED /LPF    Crystals, UA NOT REPORTED None /HPF    Epithelial Cells UA 2 TO 5 0 - 5 /HPF    Renal Epithelial, UA NOT REPORTED 0 /HPF    Bacteria, UA None None    Mucus, UA NOT REPORTED None    Trichomonas, UA NOT REPORTED None    Amorphous, UA NOT REPORTED None    Other Observations UA NOT REPORTED NOT REQ. Yeast, UA NOT REPORTED None   COVID-19, Rapid    Collection Time: 03/05/21 10:00 AM    Specimen: Nasopharyngeal Swab   Result Value Ref Range    Specimen Description . NASOPHARYNGEAL SWAB     SARS-CoV-2, Rapid Not Detected Not Detected   Troponin    Collection Time: 03/05/21 10:32 AM   Result Value Ref Range    Troponin, High Sensitivity 13 0 - 14 ng/L    Troponin T NOT REPORTED <0.03 ng/mL    Troponin Interp NOT REPORTED    Brain Natriuretic Peptide    Collection Time: 03/05/21 10:32 AM   Result Value Ref Range    Pro- (H) <300 pg/mL    BNP Interpretation Pro-BNP Reference Range:    EKG 12 Lead    Collection Time: 03/05/21 11:08 AM   Result Value Ref Range    Ventricular Rate 59 BPM    Atrial Rate 59 BPM    P-R Interval 130 ms    QRS Duration 98 ms    Q-T Interval 440 ms    QTc Calculation (Bazett) 435 ms    P Axis 27 degrees    R Axis 42 degrees    T Axis 38 degrees   Troponin    Collection Time: 03/05/21  3:41 PM   Result Value Ref Range    Troponin, High Sensitivity 12 0 - 14 ng/L    Troponin T NOT REPORTED <0.03 ng/mL    Troponin Interp NOT REPORTED        Imaging/Diagnostics:  Ct Head Wo Contrast    Result Date: 3/2/2021  No evidence of acute intracranial process. Xr Chest Portable    Result Date: 3/5/2021  No acute process. Assessment :      Hospital Problems           Last Modified POA    * (Principal) Hypertensive urgency 3/5/2021 Yes    Essential hypertension 3/5/2021 Yes    Hyperlipidemia, mixed 3/5/2021 Yes    Obesity (BMI 35.0-39.9 without comorbidity) 3/5/2021 Yes    Adrenal adenoma 3/5/2021 Yes    Current smoker 3/5/2021 Yes    Chest pain 3/5/2021 Yes          Plan:     Patient status inpatient in the Progressive Unit/Step down    1.  Hypertensive urgency-patient given IV labetalol in the ED, lisinopril

## 2021-03-07 LAB
EKG ATRIAL RATE: 71 BPM
EKG P AXIS: 61 DEGREES
EKG P-R INTERVAL: 164 MS
EKG Q-T INTERVAL: 434 MS
EKG QRS DURATION: 98 MS
EKG QTC CALCULATION (BAZETT): 471 MS
EKG R AXIS: 49 DEGREES
EKG T AXIS: 48 DEGREES
EKG VENTRICULAR RATE: 71 BPM

## 2021-03-08 ENCOUNTER — OFFICE VISIT (OUTPATIENT)
Dept: PRIMARY CARE CLINIC | Age: 56
End: 2021-03-08
Payer: MEDICAID

## 2021-03-08 ENCOUNTER — TELEPHONE (OUTPATIENT)
Dept: PRIMARY CARE CLINIC | Age: 56
End: 2021-03-08

## 2021-03-08 VITALS
DIASTOLIC BLOOD PRESSURE: 90 MMHG | HEART RATE: 62 BPM | RESPIRATION RATE: 15 BRPM | BODY MASS INDEX: 42.8 KG/M2 | SYSTOLIC BLOOD PRESSURE: 156 MMHG | HEIGHT: 62 IN | WEIGHT: 232.6 LBS | OXYGEN SATURATION: 96 %

## 2021-03-08 DIAGNOSIS — R51.9 MORNING HEADACHE: ICD-10-CM

## 2021-03-08 DIAGNOSIS — D35.02 ADENOMA OF LEFT ADRENAL GLAND: ICD-10-CM

## 2021-03-08 DIAGNOSIS — I16.9 HYPERTENSIVE CRISIS: Primary | ICD-10-CM

## 2021-03-08 DIAGNOSIS — R40.0 DAYTIME SOMNOLENCE: ICD-10-CM

## 2021-03-08 PROCEDURE — 99496 TRANSJ CARE MGMT HIGH F2F 7D: CPT | Performed by: NURSE PRACTITIONER

## 2021-03-08 NOTE — TELEPHONE ENCOUNTER
[facility] Initial Follow Up Call    Outreach made within 2 business days of discharge: Yes    Patient: Vince Santos Patient : 1965   MRN: [id number]  Reason for Admission: There are no discharge diagnoses documented for the most recent discharge. Discharge Date: 3/6/21       Spoke with: Bhavin Snow    Discharge department/facility: [facility]    TCM Interactive Patient Contact:  Was patient able to fill all prescriptions: Yes  Was patient instructed to bring all medications to the follow-up visit: Yes  Is patient taking all medications as directed in the discharge summary?  Yes  Does patient understand their discharge instructions: Yes  Does patient have questions or concerns that need addressed prior to 7-14 day follow up office visit: no    Scheduled appointment with PCP within 7-14 days    Follow Up  Future Appointments   Date Time Provider Tobin Muniz   3/8/2021 11:00 AM DK Castrejon CNP [id number]   3/15/2021  8:40 AM Alirio Beans, APRN - CNP [facility] [place], [place]

## 2021-03-08 NOTE — PROGRESS NOTES
Post-Discharge Transitional Care Management Services      Abiodun Albright   YOB: 1965    Date of Visit:  3/8/2021  30 Day Post-Discharge Date: 3/6/21    Allergies   Allergen Reactions    Gluten Meal      Abdominal pain    Pcn [Penicillins] Hives     Outpatient Medications Marked as Taking for the 3/8/21 encounter (Office Visit) with Halie Collinsco, APRN - CNP   Medication Sig Dispense Refill    albuterol sulfate  (90 Base) MCG/ACT inhaler Inhale 2 puffs into the lungs every 6 hours as needed for Wheezing or Shortness of Breath 1 Inhaler 1    lisinopril (PRINIVIL;ZESTRIL) 10 MG tablet Take 1 tablet by mouth daily 30 tablet 1    buPROPion (WELLBUTRIN SR) 100 MG extended release tablet Take 1 tablet by mouth daily 30 tablet 1    metoprolol tartrate (LOPRESSOR) 50 MG tablet Take 1 tablet by mouth 2 times daily 60 tablet 1         Vitals:    03/08/21 1049 03/08/21 1053   BP: (!) 168/88 (!) 156/90   Pulse: 62    Resp: 15    SpO2: 96%    Weight: 232 lb 9.6 oz (105.5 kg)    Height: 5' 2\" (1.575 m)      Body mass index is 42.54 kg/m². Wt Readings from Last 3 Encounters:   03/08/21 232 lb 9.6 oz (105.5 kg)   03/06/21 242 lb 11.6 oz (110.1 kg)   03/03/21 235 lb (106.6 kg)     BP Readings from Last 3 Encounters:   03/08/21 (!) 156/90   03/06/21 (!) 167/69   03/03/21 (!) 145/82        Patient was admitted to [facility] from 3/5/21 to 3/6/21 for hypertensive crisis. Inpatient course: Discharge summary reviewed- see chart. Current status: stable    Review of Systems:  A comprehensive review of systems was negative except for what was noted in the HPI.     Physical Exam:  General Appearance: alert and oriented to person, place and time, well developed and well- nourished, in no acute distress  Skin: warm and dry, no rash or erythema  Head: normocephalic and atraumatic  Eyes: pupils equal, round, and reactive to light, extraocular eye movements intact, conjunctivae normal  ENT: tympanic membrane, external ear and ear canal normal bilaterally, nose without deformity, nasal mucosa and turbinates normal without polyps  Neck: supple and non-tender without mass, no thyromegaly or thyroid nodules, no cervical lymphadenopathy  Pulmonary/Chest: clear to auscultation bilaterally- no wheezes, rales or rhonchi, normal air movement, no respiratory distress  Cardiovascular: normal rate, regular rhythm, normal S1 and S2, no murmurs, rubs, clicks, or gallops, distal pulses intact, no carotid bruits  Abdomen: soft, non-tender, non-distended, normal bowel sounds, no masses or organomegaly  Extremities: no cyanosis, clubbing or edema  Musculoskeletal: normal range of motion, no joint swelling, deformity or tenderness  Neurologic: reflexes normal and symmetric, no cranial nerve deficit, gait, coordination and speech normal        Assessment/Plan:  Lorie was seen today for follow-up from hospital, shortness of breath and hypertension. Diagnoses and all orders for this visit:    Hypertensive crisis          Diagnostic test results reviewed: inpatient labs, chest xray, CT head, Echo (WNL EF 55%)    Patient risk of morbidity and mortality: moderate    Medical Decision Making: moderate complexity     Presents for hospital follow up  BP elevated-took medication at 8:30am,  175/104 this morning before taking her medication  Weight stable    HTN: She has been taking her blood pressure multiple times a day at home. It ranges from 160/80s-200/120s. She first noticed her blood pressure was high d/t headache from the front of her head down to her eyes. States that her head feels fuzzy. She is taking the Metoprolol and tolerating well. Denies chest pain or palpitations. SOB: feels SOB on and off. Not only with exertion. Headache: Ranges from a 2-10/10. It has been constant for the last two weeks. No medication helps, she tried Tylenol. She wants to avoid Advil d/t her one kidney.  The headache is in the front of her head

## 2021-03-10 LAB
CREATININE URINE /24 HR: NORMAL MG/D (ref 500–1400)
CREATININE URINE /VOLUME: 25 MG/DL
HOURS COLLECTED: NORMAL
METANEPH/PLASMA INTERP: NORMAL
METANEPHRINE UF INTERPRETATION: NORMAL
METANEPHRINE UG/G CRE: 68 UG/G CRT (ref 0–300)
METANEPHRINE: 0.11 NMOL/L (ref 0–0.49)
METANEPHRINES 24 HOUR URINE: NORMAL UG/D (ref 36–229)
METANEPHRINES, URINE (UMOL/L): 17 UG/L
NORMETANEPHRINE PLASMA: 0.43 NMOL/L (ref 0–0.89)
NORMETANEPHRINE, (G/CRT): 248 UG/G CRT (ref 0–400)
NORMETANEPHRINE, (NMOL/DAY): NORMAL UG/D (ref 95–650)
NORMETANEPHRINES, NMOL/L: 62 UG/L
URINE VOLUME: NORMAL

## 2021-03-17 ENCOUNTER — HOSPITAL ENCOUNTER (OUTPATIENT)
Dept: CT IMAGING | Age: 56
Discharge: HOME | End: 2021-03-19

## 2021-03-17 ENCOUNTER — OFFICE VISIT (OUTPATIENT)
Dept: PRIMARY CARE CLINIC | Age: 56
End: 2021-03-17
Payer: MEDICAID

## 2021-03-17 VITALS
RESPIRATION RATE: 14 BRPM | WEIGHT: 237 LBS | HEART RATE: 74 BPM | HEIGHT: 62 IN | SYSTOLIC BLOOD PRESSURE: 152 MMHG | DIASTOLIC BLOOD PRESSURE: 98 MMHG | BODY MASS INDEX: 43.61 KG/M2 | OXYGEN SATURATION: 97 %

## 2021-03-17 DIAGNOSIS — F41.9 ANXIETY: ICD-10-CM

## 2021-03-17 DIAGNOSIS — D35.02 ADENOMA OF LEFT ADRENAL GLAND: ICD-10-CM

## 2021-03-17 DIAGNOSIS — I16.9 HYPERTENSIVE CRISIS: Primary | ICD-10-CM

## 2021-03-17 PROCEDURE — 99214 OFFICE O/P EST MOD 30 MIN: CPT | Performed by: NURSE PRACTITIONER

## 2021-03-17 PROCEDURE — 6360000004 HC RX CONTRAST MEDICATION: Performed by: NURSE PRACTITIONER

## 2021-03-17 PROCEDURE — 2580000003 HC RX 258: Performed by: NURSE PRACTITIONER

## 2021-03-17 PROCEDURE — 74170 CT ABD WO CNTRST FLWD CNTRST: CPT

## 2021-03-17 RX ORDER — LISINOPRIL 10 MG/1
10 TABLET ORAL DAILY
Qty: 90 TABLET | Refills: 1 | Status: SHIPPED | OUTPATIENT
Start: 2021-03-17 | End: 2021-08-25

## 2021-03-17 RX ORDER — SODIUM CHLORIDE 0.9 % (FLUSH) 0.9 %
10 SYRINGE (ML) INJECTION PRN
Status: DISCONTINUED | OUTPATIENT
Start: 2021-03-17 | End: 2021-03-20 | Stop reason: HOSPADM

## 2021-03-17 RX ORDER — 0.9 % SODIUM CHLORIDE 0.9 %
80 INTRAVENOUS SOLUTION INTRAVENOUS ONCE
Status: COMPLETED | OUTPATIENT
Start: 2021-03-17 | End: 2021-03-17

## 2021-03-17 RX ORDER — ALBUTEROL SULFATE 90 UG/1
2 AEROSOL, METERED RESPIRATORY (INHALATION) EVERY 6 HOURS PRN
Qty: 1 INHALER | Refills: 3 | Status: SHIPPED | OUTPATIENT
Start: 2021-03-17 | End: 2021-08-25 | Stop reason: SDUPTHER

## 2021-03-17 RX ORDER — BUPROPION HYDROCHLORIDE 150 MG/1
150 TABLET, EXTENDED RELEASE ORAL DAILY
Qty: 180 TABLET | Refills: 0 | Status: SHIPPED | OUTPATIENT
Start: 2021-03-17 | End: 2021-08-25 | Stop reason: SDUPTHER

## 2021-03-17 RX ORDER — METOPROLOL TARTRATE 50 MG/1
50 TABLET, FILM COATED ORAL 2 TIMES DAILY
Qty: 180 TABLET | Refills: 1 | Status: SHIPPED | OUTPATIENT
Start: 2021-03-17 | End: 2021-08-25 | Stop reason: SDUPTHER

## 2021-03-17 RX ADMIN — SODIUM CHLORIDE, PRESERVATIVE FREE 10 ML: 5 INJECTION INTRAVENOUS at 07:50

## 2021-03-17 RX ADMIN — IOPAMIDOL 75 ML: 755 INJECTION, SOLUTION INTRAVENOUS at 07:49

## 2021-03-17 RX ADMIN — SODIUM CHLORIDE 80 ML: 9 INJECTION, SOLUTION INTRAVENOUS at 07:50

## 2021-03-17 ASSESSMENT — ENCOUNTER SYMPTOMS
SHORTNESS OF BREATH: 1
BACK PAIN: 0
ABDOMINAL PAIN: 0
COUGH: 0

## 2021-03-17 NOTE — PROGRESS NOTES
[facility] PRIMARY CARE  [address] [facility] [address]  [address] [address]  Dept: [phone]  Dept Fax: [phone]    Delgado Polanco is a 50 y.o. female who presentstoday for her medical conditions/complaints as noted below. Delgado Polanco is c/o of  Chief Complaint   Patient presents with    Hypertension     1 week recheck          HPI:     Presents for one week follow up on HTN  BP elevated- has not taken medication today  Up 5 lbs since last visit    HTN: Is not checking regularly at home. Last reading she had three days ago was 128/82 and she has been feeling really good, so she did not think she needed to keep checking it. Denies chest pain, palpitations, head aches, or vision changes. She has not started Lisinopril yet, that was started after hospital encounter. CT of abdomen reviewed, adrenal adenoma unchanged from previous CT, no further follow up recommended     Increase in anxiety. Finds herself to be more on edge and tearful.  Having \"panic attacks\"  Would like to discuss med adjustment    Declined the COVID vaccine     Denies any other problems/concerns       Hemoglobin A1C (%)   Date Value   10/31/2019 5.3   10/18/2017 5.4             ( goal A1C is < 7)   No results found for: LABMICR  LDL Cholesterol (mg/dL)   Date Value   2021 206 (H)   2018            (goal LDL is <100)   AST (U/L)   Date Value   2021 12     ALT (U/L)   Date Value   2021 15     BUN (mg/dL)   Date Value   2021 26 (H)     BP Readings from Last 3 Encounters:   21 (!) 152/98   21 (!) 156/90   21 (!) 167/69          ([id number]/80)    Past Medical History:   Diagnosis Date    Anxiety     Chronic back pain     Depression     Hypertension     Mass of adrenal gland ([address]     Obesity     Solitary left kidney     Transient gluten sensitivity 2019    WPW (Emiliana-Parkinson-White syndrome) 2009      Past Surgical COVID-19 Vaccine (1) Never done    DTaP/Tdap/Td vaccine (1 - Tdap) Never done    Shingles Vaccine (1 of 2) Never done    TSH testing  06/20/2020    Flu vaccine (1) 03/08/2022 (Originally 9/1/2020)    Breast cancer screen  05/09/2021    Potassium monitoring  03/06/2022    Creatinine monitoring  03/06/2022    Cervical cancer screen  06/27/2024    Lipid screen  03/06/2026    Colon cancer screen colonoscopy  02/21/2029    Hepatitis A vaccine  Aged Out    Hepatitis B vaccine  Aged Out    Hib vaccine  Aged Out    Meningococcal (ACWY) vaccine  Aged Out       Subjective:      Review of Systems   Constitutional: Negative for chills, fatigue and fever. HENT: Negative for congestion. Eyes: Negative for visual disturbance. Respiratory: Positive for shortness of breath (inhaler helps, working on stopping smoking). Negative for cough. Cardiovascular: Negative for chest pain and palpitations. Gastrointestinal: Negative for abdominal pain. Genitourinary: Negative for dysuria. Musculoskeletal: Negative for back pain. Neurological: Negative for dizziness, numbness and headaches. Psychiatric/Behavioral: Negative for self-injury, sleep disturbance and suicidal ideas. The patient is not nervous/anxious. Objective:     Physical Exam  Vitals signs and nursing note reviewed. Constitutional:       Appearance: She is well-developed. HENT:      Head: Normocephalic and atraumatic. Eyes:      Pupils: Pupils are equal, round, and reactive to light. Neck:      Musculoskeletal: Normal range of motion and neck supple. Cardiovascular:      Rate and Rhythm: Normal rate and regular rhythm. Heart sounds: Normal heart sounds. Pulmonary:      Effort: Pulmonary effort is normal.      Breath sounds: Normal breath sounds. Abdominal:      General: Bowel sounds are normal.      Palpations: Abdomen is soft. Tenderness: There is no abdominal tenderness. Musculoskeletal: Normal range of motion. Skin:     General: Skin is warm and dry. Neurological:      Mental Status: She is alert and oriented to person, place, and time. Psychiatric:         Behavior: Behavior normal.         Thought Content: Thought content normal.         Judgment: Judgment normal.       BP (!) 152/98   Pulse 74   Resp 14   Ht 5' 2\" (1.575 m)   Wt 237 lb (107.5 kg)   SpO2 97%   Breastfeeding No   BMI 43.35 kg/m²     Assessment:       Diagnosis Orders   1. Hypertensive crisis     2. Adenoma of left adrenal gland     3. Anxiety               Plan:      Return in about 1 week (around 3/24/2021) for recheck. 1. HTN- Encouraged diet/exercise. Reviewed importance of taking meds as prescribed. Monitor and record BP readings daily and bring readings and machine into office at next recheck. Follow up in one week. 2. Anxiety- Increased. Will increase wellbutrin to 150mg daily. Follow up in one week for recheck. 3. Adenoma left adrenal gland- Stable. No further testing recommended. Orders Placed This Encounter   Medications    metoprolol tartrate (LOPRESSOR) 50 MG tablet     Sig: Take 1 tablet by mouth 2 times daily     Dispense:  180 tablet     Refill:  1    albuterol sulfate  (90 Base) MCG/ACT inhaler     Sig: Inhale 2 puffs into the lungs every 6 hours as needed for Wheezing or Shortness of Breath     Dispense:  1 Inhaler     Refill:  3    lisinopril (PRINIVIL;ZESTRIL) 10 MG tablet     Sig: Take 1 tablet by mouth daily     Dispense:  90 tablet     Refill:  1    buPROPion (WELLBUTRIN SR) 150 MG extended release tablet     Sig: Take 1 tablet by mouth daily     Dispense:  180 tablet     Refill:  0       Patient given educational materials - see patient instructions. Discussed use, benefit, and side effects of prescribed medications. All patientquestions answered. Pt voiced understanding. Reviewed health maintenance. Instructedto continue current medications, diet and exercise. Patient agreed with treatmentplan. Follow up as directed.      Electronicallysigned by DK Perkins CNP on 3/17/2021 at 10:04 AM

## 2021-08-25 ENCOUNTER — OFFICE VISIT (OUTPATIENT)
Dept: PRIMARY CARE CLINIC | Age: 56
End: 2021-08-25
Payer: COMMERCIAL

## 2021-08-25 VITALS
DIASTOLIC BLOOD PRESSURE: 102 MMHG | HEIGHT: 62 IN | SYSTOLIC BLOOD PRESSURE: 170 MMHG | RESPIRATION RATE: 16 BRPM | WEIGHT: 231 LBS | HEART RATE: 84 BPM | BODY MASS INDEX: 42.51 KG/M2

## 2021-08-25 DIAGNOSIS — Z13.220 SCREENING FOR LIPID DISORDERS: ICD-10-CM

## 2021-08-25 DIAGNOSIS — Z13.0 SCREENING FOR DEFICIENCY ANEMIA: ICD-10-CM

## 2021-08-25 DIAGNOSIS — Z11.59 NEED FOR HEPATITIS C SCREENING TEST: ICD-10-CM

## 2021-08-25 DIAGNOSIS — Z13.29 SCREENING FOR THYROID DISORDER: ICD-10-CM

## 2021-08-25 DIAGNOSIS — M25.532 LEFT WRIST PAIN: ICD-10-CM

## 2021-08-25 DIAGNOSIS — G89.29 CHRONIC MIDLINE LOW BACK PAIN WITH RIGHT-SIDED SCIATICA: Primary | ICD-10-CM

## 2021-08-25 DIAGNOSIS — Z11.4 SCREENING FOR HIV (HUMAN IMMUNODEFICIENCY VIRUS): ICD-10-CM

## 2021-08-25 DIAGNOSIS — M54.41 CHRONIC MIDLINE LOW BACK PAIN WITH RIGHT-SIDED SCIATICA: Primary | ICD-10-CM

## 2021-08-25 DIAGNOSIS — M25.512 CHRONIC LEFT SHOULDER PAIN: ICD-10-CM

## 2021-08-25 DIAGNOSIS — G89.29 CHRONIC LEFT SHOULDER PAIN: ICD-10-CM

## 2021-08-25 DIAGNOSIS — L91.8 SKIN TAG: ICD-10-CM

## 2021-08-25 DIAGNOSIS — Z13.1 SCREENING FOR DIABETES MELLITUS: ICD-10-CM

## 2021-08-25 DIAGNOSIS — Z12.31 VISIT FOR SCREENING MAMMOGRAM: ICD-10-CM

## 2021-08-25 PROCEDURE — 99215 OFFICE O/P EST HI 40 MIN: CPT | Performed by: NURSE PRACTITIONER

## 2021-08-25 RX ORDER — ALBUTEROL SULFATE 90 UG/1
2 AEROSOL, METERED RESPIRATORY (INHALATION) EVERY 6 HOURS PRN
Qty: 1 INHALER | Refills: 3 | Status: SHIPPED | OUTPATIENT
Start: 2021-08-25

## 2021-08-25 RX ORDER — BUPROPION HYDROCHLORIDE 150 MG/1
150 TABLET, EXTENDED RELEASE ORAL DAILY
Qty: 180 TABLET | Refills: 0 | Status: SHIPPED | OUTPATIENT
Start: 2021-08-25 | End: 2021-09-27

## 2021-08-25 RX ORDER — METHOCARBAMOL 750 MG/1
750 TABLET, FILM COATED ORAL 3 TIMES DAILY PRN
Qty: 30 TABLET | Refills: 0 | Status: SHIPPED | OUTPATIENT
Start: 2021-08-25

## 2021-08-25 RX ORDER — METOPROLOL TARTRATE 50 MG/1
50 TABLET, FILM COATED ORAL 2 TIMES DAILY
Qty: 180 TABLET | Refills: 1 | Status: SHIPPED | OUTPATIENT
Start: 2021-08-25

## 2021-08-25 ASSESSMENT — ENCOUNTER SYMPTOMS
COUGH: 0
BACK PAIN: 1
SHORTNESS OF BREATH: 0
ABDOMINAL PAIN: 0

## 2021-08-25 ASSESSMENT — PATIENT HEALTH QUESTIONNAIRE - PHQ9
SUM OF ALL RESPONSES TO PHQ QUESTIONS 1-9: 2
1. LITTLE INTEREST OR PLEASURE IN DOING THINGS: 1
2. FEELING DOWN, DEPRESSED OR HOPELESS: 1
SUM OF ALL RESPONSES TO PHQ9 QUESTIONS 1 & 2: 2

## 2021-08-25 NOTE — LETTER
[facility] Primary Care  [address] [facility]  Phone: [phone]  Fax: [phone]    DK Joseph CNP        August 25, 2021     Patient: Alexandrea Corey   YOB: 1965   Date of Visit: 8/25/2021       To Whom It May Concern: It is my medical opinion that Jeannie Boland should be excused 8/24/21-8/25/21. May return 8/27/21 without restrictions. If you have any questions or concerns, please don't hesitate to call.     Sincerely,        DK Joseph CNP

## 2021-08-25 NOTE — PROGRESS NOTES
156/90          (dtav296/80)    Past Medical History:   Diagnosis Date    Anxiety     Chronic back pain     Depression     Hypertension     Mass of adrenal gland ([address]     Obesity     Solitary left kidney     Transient gluten sensitivity 02/21/2019    WPW (Emiliana-Parkinson-White syndrome) 2009      Past Surgical History:   Procedure Laterality Date    ABLATION OF DYSRHYTHMIC FOCUS      x 2-3 due to WPW    APPENDECTOMY      ARM SURGERY      COLONOSCOPY N/A 2/21/2019    COLONOSCOPY POLYPECTOMY SNARE/COLD BIOPSY performed by Elver Solo MD at [address] ENDOSCOPY N/A 2/21/2019    EGD BIOPSY performed by Elvre Solo MD at [facility] History   Problem Relation Age of Onset    Stroke Mother     Cancer Mother         bladder    Diabetes Father     Diabetes Sister     Ovarian Cancer Sister           Social History     Tobacco Use    Smoking status: Current Every Day Smoker     Packs/day: 0.50     Types: Cigarettes    Smokeless tobacco: Never Used    Tobacco comment: decreasing with wellbutrin, currently about 3 cigarettes per day   Substance Use Topics    Alcohol use: No      Current Outpatient Medications   Medication Sig Dispense Refill    metoprolol tartrate (LOPRESSOR) 50 MG tablet Take 1 tablet by mouth 2 times daily 180 tablet 1    buPROPion (WELLBUTRIN SR) 150 MG extended release tablet Take 1 tablet by mouth daily 180 tablet 0    albuterol sulfate  (90 Base) MCG/ACT inhaler Inhale 2 puffs into the lungs every 6 hours as needed for Wheezing or Shortness of Breath 1 Inhaler 3    methocarbamol (ROBAXIN-750) 750 MG tablet Take 1 tablet by mouth 3 times daily as needed (back spasm) 30 tablet 0     No current facility-administered medications for this visit.      Allergies   Allergen Reactions    Gluten Meal      Abdominal pain    Pcn [Penicillins] Hives       Health Maintenance   Topic Date Due    Hepatitis C screen  Never done    HIV screen  Never done    TSH testing  06/20/2020    Breast cancer screen  05/09/2021    DTaP/Tdap/Td vaccine (1 - Tdap) 09/15/2021 (Originally 3/31/1986)    COVID-19 Vaccine (1) 08/16/2022 (Originally 3/31/1979)    Pneumococcal 0-64 years Vaccine (1 of 2 - PPSV23) 08/17/2022 (Originally 3/31/1973)    Shingles Vaccine (1 of 2) 08/25/2022 (Originally 3/31/2017)    Flu vaccine (1) 09/01/2021    Potassium monitoring  03/06/2022    Creatinine monitoring  03/06/2022    Cervical cancer screen  06/27/2024    Lipid screen  03/06/2026    Colon cancer screen colonoscopy  02/21/2029    Hepatitis A vaccine  Aged Out    Hepatitis B vaccine  Aged Out    Hib vaccine  Aged Out    Meningococcal (ACWY) vaccine  Aged Out       Subjective:      Review of Systems   Constitutional: Negative for chills, fatigue and fever. HENT: Negative for congestion. Eyes: Negative for visual disturbance. Respiratory: Negative for cough and shortness of breath. Cardiovascular: Negative for chest pain and palpitations. Gastrointestinal: Negative for abdominal pain. Genitourinary: Negative for dysuria. Musculoskeletal: Positive for arthralgias, back pain and myalgias. Neurological: Negative for dizziness, numbness and headaches. Psychiatric/Behavioral: Positive for sleep disturbance. Negative for self-injury and suicidal ideas. The patient is nervous/anxious. Objective:     Physical Exam  Vitals and nursing note reviewed. Constitutional:       Appearance: She is well-developed. HENT:      Head: Normocephalic and atraumatic. Eyes:      Pupils: Pupils are equal, round, and reactive to light. Cardiovascular:      Rate and Rhythm: Normal rate and regular rhythm. Heart sounds: Normal heart sounds. Pulmonary:      Effort: Pulmonary effort is normal.      Breath sounds: Normal breath sounds. Abdominal:      General: Bowel sounds are normal.      Palpations: Abdomen is soft. Tenderness:  There is no abdominal tenderness. Musculoskeletal:         General: Normal range of motion. Cervical back: Normal range of motion and neck supple. Skin:     General: Skin is warm and dry. Neurological:      Mental Status: She is alert and oriented to person, place, and time. Psychiatric:         Behavior: Behavior normal.         Thought Content: Thought content normal.         Judgment: Judgment normal.       BP (!) 170/102   Pulse 84   Resp 16   Ht 5' 2\" (1.575 m)   Wt 231 lb (104.8 kg)   BMI 42.25 kg/m²     Assessment:       Diagnosis Orders   1. Chronic midline low back pain with right-sided sciatica  XR LUMBAR SPINE (2-3 VIEWS)    [facility]   2. Left wrist pain  [facility]   3. Chronic left shoulder pain  [facility]   4. Visit for screening mammogram  GAMALIEL DIGITAL SCREEN W OR WO CAD BILATERAL   5. Screening for HIV (human immunodeficiency virus)  HIV Screen   6. Need for hepatitis C screening test  Hepatitis C Antibody   7. Screening for thyroid disorder  TSH with Reflex   8. Screening for diabetes mellitus  Comprehensive Metabolic Panel   9. Screening for deficiency anemia  CBC   10. Screening for lipid disorders  Lipid Panel   11. Skin tag  Amb External Referral To Dermatology             Plan:      Return in about 1 month (around 9/25/2021) for annual exam.    1. Back pain/wrist pain/shoulder pain- Rx given for muscle relaxer with instruction for use. Rx given for referral to pain management, update imaging of xray lumbar spine prior. Obtain [facility] ER note. Follow up in one month for recheck. 2. HM- Rx given for annual labs and mammogram. Resume meds- especially for HTN. Follow up in one month for recheck/annual exam.  3. Skin tags- Rx given for referral to derm, follow up as needed.     Orders Placed This Encounter   Procedures    GAMALIEL DIGITAL SCREEN W OR WO CAD BILATERAL     Standing Status:   Future     Standing Expiration Date: 8/25/2022     Order Specific Question:   Reason for exam:     Answer:   screening    XR LUMBAR SPINE (2-3 VIEWS)     Standing Status:   Future     Standing Expiration Date:   8/25/2022    HIV Screen     Standing Status:   Future     Standing Expiration Date:   8/25/2022    Hepatitis C Antibody     Standing Status:   Future     Standing Expiration Date:   8/25/2022    TSH with Reflex     Standing Status:   Future     Standing Expiration Date:   8/25/2022    Comprehensive Metabolic Panel     Standing Status:   Future     Standing Expiration Date:   8/25/2022    CBC     Standing Status:   Future     Standing Expiration Date:   8/25/2022    Lipid Panel     Standing Status:   Future     Standing Expiration Date:   8/25/2022     Order Specific Question:   Is Patient Fasting?/# of Hours     Answer:   [facility]     Referral Priority:   Routine     Referral Type:   Eval and Treat     Referral Reason:   Specialty Services Required     Requested Specialty:   Pain Management     Number of Visits Requested:   1    Amb External Referral To Dermatology     Referral Priority:   Routine     Referral Type:   Consult for Advice and Opinion     Referral Reason:   Specialty Services Required     Requested Specialty:   Dermatology     Number of Visits Requested:   1        Orders Placed This Encounter   Medications    metoprolol tartrate (LOPRESSOR) 50 MG tablet     Sig: Take 1 tablet by mouth 2 times daily     Dispense:  180 tablet     Refill:  1    buPROPion (WELLBUTRIN SR) 150 MG extended release tablet     Sig: Take 1 tablet by mouth daily     Dispense:  180 tablet     Refill:  0    albuterol sulfate  (90 Base) MCG/ACT inhaler     Sig: Inhale 2 puffs into the lungs every 6 hours as needed for Wheezing or Shortness of Breath     Dispense:  1 Inhaler     Refill:  3    methocarbamol (ROBAXIN-750) 750 MG tablet     Sig: Take 1 tablet by mouth 3 times daily as needed (back spasm) Dispense:  30 tablet     Refill:  0       Patient given educational materials - see patient instructions. Discussed use, benefit, and side effects of prescribed medications. All patientquestions answered. Pt voiced understanding. Reviewed health maintenance. Instructedto continue current medications, diet and exercise. Patient agreed with treatmentplan. Follow up as directed.      Electronicallysigned by DK Judge CNP on 8/25/2021 at 10:16 AM

## 2021-09-01 ENCOUNTER — TELEPHONE (OUTPATIENT)
Dept: PRIMARY CARE CLINIC | Age: 56
End: 2021-09-01

## 2021-09-01 NOTE — TELEPHONE ENCOUNTER
----- Message from Para Door sent at 8/31/2021  9:50 AM EDT -----  Subject: Message to Provider    QUESTIONS  Information for Provider? Pt called to advise Pain mgmnt has not received   referral from [address] [place] [address]. Please call to advise when she is able to call   and make appt.   ---------------------------------------------------------------------------  --------------  CALL BACK INFO  What is the best way for the office to contact you? OK to leave message on   voicemail  Preferred Call Back Phone Number? [phone]  ---------------------------------------------------------------------------  --------------  SCRIPT ANSWERS  Relationship to Patient?  Self

## 2021-09-21 ENCOUNTER — TELEPHONE (OUTPATIENT)
Dept: PRIMARY CARE CLINIC | Age: 56
End: 2021-09-21

## 2021-09-21 NOTE — TELEPHONE ENCOUNTER
Patient is scheduled for Pain management on 09/28/2021 she is in severe back pain with pain tingling down left leg, she has one Vicodin left for today then will be out of pain medication. She is scheduled to see you on 09/27/2021 and then pain management on 09/28/21  Should she reschedule to see you after pain management or can you call in something till she is seen.

## 2021-09-21 NOTE — TELEPHONE ENCOUNTER
Please call her pain management to see if she can be seen earlier. If not please double book her tomorrow or Thursday am with me.  thanks

## 2021-09-22 ENCOUNTER — OFFICE VISIT (OUTPATIENT)
Dept: PRIMARY CARE CLINIC | Age: 56
End: 2021-09-22
Payer: COMMERCIAL

## 2021-09-22 VITALS
DIASTOLIC BLOOD PRESSURE: 78 MMHG | WEIGHT: 229 LBS | RESPIRATION RATE: 13 BRPM | HEIGHT: 62 IN | OXYGEN SATURATION: 97 % | SYSTOLIC BLOOD PRESSURE: 130 MMHG | BODY MASS INDEX: 42.14 KG/M2 | HEART RATE: 81 BPM

## 2021-09-22 DIAGNOSIS — M54.41 CHRONIC MIDLINE LOW BACK PAIN WITH RIGHT-SIDED SCIATICA: Primary | ICD-10-CM

## 2021-09-22 DIAGNOSIS — Z78.9 NEED FOR FOLLOW-UP BY SOCIAL WORKER: ICD-10-CM

## 2021-09-22 DIAGNOSIS — G89.29 CHRONIC MIDLINE LOW BACK PAIN WITH RIGHT-SIDED SCIATICA: Primary | ICD-10-CM

## 2021-09-22 PROCEDURE — 99214 OFFICE O/P EST MOD 30 MIN: CPT | Performed by: NURSE PRACTITIONER

## 2021-09-22 RX ORDER — HYDROCODONE BITARTRATE AND ACETAMINOPHEN 5; 325 MG/1; MG/1
1 TABLET ORAL EVERY 4 HOURS PRN
Qty: 18 TABLET | Refills: 0 | Status: SHIPPED | OUTPATIENT
Start: 2021-09-22 | End: 2021-09-25

## 2021-09-22 SDOH — ECONOMIC STABILITY: FOOD INSECURITY: WITHIN THE PAST 12 MONTHS, THE FOOD YOU BOUGHT JUST DIDN'T LAST AND YOU DIDN'T HAVE MONEY TO GET MORE.: NEVER TRUE

## 2021-09-22 SDOH — ECONOMIC STABILITY: FOOD INSECURITY: WITHIN THE PAST 12 MONTHS, YOU WORRIED THAT YOUR FOOD WOULD RUN OUT BEFORE YOU GOT MONEY TO BUY MORE.: NEVER TRUE

## 2021-09-22 ASSESSMENT — SOCIAL DETERMINANTS OF HEALTH (SDOH): HOW HARD IS IT FOR YOU TO PAY FOR THE VERY BASICS LIKE FOOD, HOUSING, MEDICAL CARE, AND HEATING?: SOMEWHAT HARD

## 2021-09-22 ASSESSMENT — ENCOUNTER SYMPTOMS
COUGH: 0
NAUSEA: 1
SHORTNESS OF BREATH: 0
ABDOMINAL PAIN: 1
BACK PAIN: 1

## 2021-09-22 NOTE — LETTER
[facility] Primary Care  [address]  [address] [address]  Phone: [phone]  Fax: [phone]    DK Anaya CNP        September 22, 2021     Patient: Erwin Mcintyre   YOB: 1965   Date of Visit: 9/22/2021       To Whom It May Concern: It is my medical opinion that Rola Lopez should be excused 9/20/21-9/22/21. May return to work on 9/24/21 without restrictions . If you have any questions or concerns, please don't hesitate to call.     Sincerely,        DK Anaya CNP

## 2021-09-22 NOTE — PROGRESS NOTES
[facility]  [address]  [address] [address]  Dept: [phone]  Dept Fax: [phone]    Richy Cruz is a 49 y.o. female who presentstoday for her medical conditions/complaints as noted below.   Richy Cruz is c/o of  Chief Complaint   Patient presents with    Back Pain     low back pain getting worse            HPI:     Presents for acute visit with several concerns  BP well controlled  Has lost 2lb since last visit    Lower back pain is worsening  Seen at [facility], given 6 norco in august  Has made those last until today, usually taking at bedtime if unable to sleep  Has appt with pain management for further zuleyka  Has had to miss work this week d/t the pain and not being able to lift  Requesting additional norco until zuleyka  Has order for xray lumbar spine that needs completed, will do so today    Increase in anxiety/depression with increase in pain  Does not want to est with PSY as she will be losing her insurance at the end of the month  This is also a stressor for her, currently looking for another job that will have health insurance  Continues to take her meds as prescribed  Willing to meet with  for any available resources    Denies any other problems/concerns      Hemoglobin A1C (%)   Date Value   10/31/2019 5.3   10/18/2017 5.4             ( goal A1C is < 7)   No results found for: LABMICR  LDL Cholesterol (mg/dL)   Date Value   03/06/2021 06/20/2019 206 (H)   05/31/2018            (goal LDL is <100)   AST (U/L)   Date Value   03/06/2021 12     ALT (U/L)   Date Value   03/06/2021 15     BUN (mg/dL)   Date Value   03/06/2021 26 (H)     BP Readings from Last 3 Encounters:   09/22/21 130/78   08/25/21 (!) 170/102   03/17/21 (!) 152/98          ([id number])    Past Medical History:   Diagnosis Date    Anxiety     Chronic back pain     Depression     Hypertension     Mass of adrenal gland ([address]     Obesity     Solitary left kidney     Transient gluten sensitivity 02/21/2019    WPW (Emiliana-Parkinson-White syndrome) 2009      Past Surgical History:   Procedure Laterality Date    ABLATION OF DYSRHYTHMIC FOCUS      x 2-3 due to WPW    APPENDECTOMY      ARM SURGERY      COLONOSCOPY N/A 2/21/2019    COLONOSCOPY POLYPECTOMY SNARE/COLD BIOPSY performed by Lisa Elizabeth MD at [address] ENDOSCOPY N/A 2/21/2019    EGD BIOPSY performed by Lisa Elizabeth MD at [address] History   Problem Relation Age of Onset    Stroke Mother     Cancer Mother         bladder    Diabetes Father     Diabetes Sister     Ovarian Cancer Sister           Social History     Tobacco Use    Smoking status: Current Every Day Smoker     Packs/day: 0.50     Years: 10.00     Pack years: 5.00     Types: Cigarettes    Smokeless tobacco: Never Used    Tobacco comment: decreasing with wellbutrin, currently about 3 cigarettes per day   Substance Use Topics    Alcohol use: No      Current Outpatient Medications   Medication Sig Dispense Refill    HYDROcodone-acetaminophen (NORCO) 5-325 MG per tablet Take 1 tablet by mouth every 4 hours as needed for Pain for up to 3 days. Intended supply: 3 days. Take lowest dose possible to manage pain 18 tablet 0    metoprolol tartrate (LOPRESSOR) 50 MG tablet Take 1 tablet by mouth 2 times daily 180 tablet 1    buPROPion (WELLBUTRIN SR) 150 MG extended release tablet Take 1 tablet by mouth daily 180 tablet 0    albuterol sulfate  (90 Base) MCG/ACT inhaler Inhale 2 puffs into the lungs every 6 hours as needed for Wheezing or Shortness of Breath 1 Inhaler 3    methocarbamol (ROBAXIN-750) 750 MG tablet Take 1 tablet by mouth 3 times daily as needed (back spasm) 30 tablet 0     No current facility-administered medications for this visit.      Allergies   Allergen Reactions    Gluten Meal      Abdominal pain    Pcn [Penicillins] Hives       Health Maintenance Topic Date Due    Hepatitis C screen  Never done    HIV screen  Never done    TSH testing  06/20/2020    Breast cancer screen  05/09/2021    DTaP/Tdap/Td vaccine (1 - Tdap) 10/13/2021 (Originally 3/31/1986)    Flu vaccine (1) 06/30/2022 (Originally 9/1/2021)    COVID-19 Vaccine (1) 08/16/2022 (Originally 3/31/1979)    Pneumococcal 0-64 years Vaccine (1 of 2 - PPSV23) 08/17/2022 (Originally 3/31/1973)    Shingles Vaccine (1 of 2) 08/25/2022 (Originally 3/31/2017)    Potassium monitoring  03/06/2022    Creatinine monitoring  03/06/2022    Cervical cancer screen  06/27/2024    Lipid screen  03/06/2026    Colon cancer screen colonoscopy  02/21/2029    Hepatitis A vaccine  Aged Out    Hepatitis B vaccine  Aged Out    Hib vaccine  Aged Out    Meningococcal (ACWY) vaccine  Aged Out       Subjective:      Review of Systems   Constitutional: Negative for chills, fatigue and fever. HENT: Negative for congestion. Eyes: Negative for visual disturbance. Respiratory: Negative for cough and shortness of breath. Cardiovascular: Negative for chest pain and palpitations. Gastrointestinal: Positive for abdominal pain and nausea. Genitourinary: Negative for dysuria. Musculoskeletal: Positive for arthralgias and back pain. Neurological: Negative for dizziness, numbness and headaches. Psychiatric/Behavioral: Negative for self-injury, sleep disturbance and suicidal ideas. The patient is not nervous/anxious. Objective:     Physical Exam  Vitals and nursing note reviewed. Constitutional:       Appearance: She is well-developed. HENT:      Head: Normocephalic and atraumatic. Eyes:      Pupils: Pupils are equal, round, and reactive to light. Cardiovascular:      Rate and Rhythm: Normal rate and regular rhythm. Heart sounds: Normal heart sounds. Pulmonary:      Effort: Pulmonary effort is normal.      Breath sounds: Normal breath sounds.    Abdominal:      General: Bowel sounds are normal.      Palpations: Abdomen is soft. Tenderness: There is no abdominal tenderness. Musculoskeletal:         General: Normal range of motion. Cervical back: Normal range of motion and neck supple. Skin:     General: Skin is warm and dry. Neurological:      Mental Status: She is alert and oriented to person, place, and time. Psychiatric:         Behavior: Behavior normal.         Thought Content: Thought content normal.         Judgment: Judgment normal.       /78   Pulse 81   Resp 13   Ht 5' 2\" (1.575 m)   Wt 229 lb (103.9 kg)   SpO2 97%   Breastfeeding No   BMI 41.88 kg/m²     Assessment:       Diagnosis Orders   1. Chronic midline low back pain with right-sided sciatica  HYDROcodone-acetaminophen (NORCO) 5-325 MG per tablet   2. Need for follow-up by   [facility] Referral for Social Determinants of Health             Plan:      Return if symptoms worsen or fail to improve, for on 9/27/21. 1. Chronic lower back pain- Update xray lumbar spine. Work note given. Rx given for norco with instruction for use. Follow with pain management as planned. Follow up next week as planned for recheck. Orders Placed This Encounter   Procedures   [facility] Referral for Social Determinants of Health     Referral Priority:   Routine     Referral Type: Other     Referral Reason:   Specialty Services Required     Requested Specialty:   Licensed Clinical      Number of Visits Requested:   1          Patient given educational materials - see patient instructions. Discussed use, benefit, and side effects of prescribed medications. All patientquestions answered. Pt voiced understanding. Reviewed health maintenance. Instructedto continue current medications, diet and exercise. Patient agreed with treatmentplan. Follow up as directed.      Electronicallysigned by DK Waller CNP on 9/22/2021 at 9:34 AM

## 2021-09-23 ENCOUNTER — TELEPHONE (OUTPATIENT)
Dept: FAMILY MEDICINE CLINIC | Age: 56
End: 2021-09-23

## 2021-09-23 NOTE — TELEPHONE ENCOUNTER
Spoke to patient to relay the resources collected on her behalf. All resources discussed with also be outlined in the packet she will receive in the mail. Discussed the followin. Health insurance (via [facility] [employer])   2.) Per scription Assistance Program for the [place]. Patient is already utilizing GoodRx. 3.) Energy Assistance Programs  4.) [facility]; accept board funding   5.) Medical cert has been faxed to PCP office. Dariel lange at the EOD to ensure it was received. The writer will follow up with the patient in 1 week.      Harry Schultz

## 2021-09-23 NOTE — TELEPHONE ENCOUNTER
Sammy Huertas was contacted  by writer to discuss referral for SDOH related needs. Writer spoke with: Patient and explained the services and assistance that can be provided through the patient navigation program.     Patient agreeable to receiving resources and support from Eric Chadwick. Discussed the following with the patient:      Plan of Care:  Help with doctor's bills \"Afraid no one is going to help me because I do make more at [employer] than what I did at the [employer]\"      Income:    (missing work because of depression; argument with coworker BAD almost fought with coworker)  Currently working @ [employer] ( hire in) - do not offer insurance after a year of employment . .. Has had to miss work this week d/t the pain and not being able to lift  Does not need help finding a job    Insurance:   will be losing her insurance at the end of the month. .. currently looking for another job that will have health insurance  Makes too much for medicaid   Will be going to the pain clinic on the  to get established but will have to stop once insurance runs out. 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 No idea of when the eviction is coming or even if it is going to occur   Sister (58/59) has a loan for $120,000 and wants to buy a house where they can all live together    is a senior on [address] will be through them   Look at getting a house- cannot afford an apartment. Currently paying $450 (price range; under $500/mo for rent)   \"I'm embarrassed, it's a mess. I feel like a hoarder person you see on tv\"     Utilities:  [place] is for $400+. .. has to pay $200 to prevent the lights from being turned off.    Disconnection on the fifth   No food in the house; no gas in her car   Next paycheck is for her car payment and rent   Submit a medical cert- understands it does not take away to balance of the bill   Between pay periods with the job transition   Application for [employer] [employer]:  Does not want a list of food pantries     Mental Health:  Does want to go to therapy- depression, loss of loved ones- struggling with grieving (best friend)   Needs psych that will work with transitioning insurance   Therapy scares her because of the thoughts in her head- cannot afford inpatient   Thoughts of suicidality- will not go through it \"I'm Alevism\" - has looked at how much of her heart medication would kill her. Has gone through suicide-attempt recovery in her 19's and it scares her to go through it again. Patient was referred to:   Aliyah Good will conduct research on insurance; PIPP/ HEAP application; medical cert to prevent shut-off; mental health       Follow up with patient necessary: Yes    Follow up with resource organization necessary: Yes    Patient has given verbal permission to leave a detailed message on phone. N/A    Patient has given verbal permission to submit applications on their behalf. N/A    Patient was provided with writer's contact information should they require any additional assistance.        Lester Nolan

## 2021-09-27 ENCOUNTER — HOSPITAL ENCOUNTER (OUTPATIENT)
Dept: GENERAL RADIOLOGY | Age: 56
Discharge: HOME | End: 2021-09-29
Payer: COMMERCIAL

## 2021-09-27 ENCOUNTER — HOSPITAL ENCOUNTER (OUTPATIENT)
Dept: MAMMOGRAPHY | Age: 56
Discharge: HOME | End: 2021-09-29
Payer: COMMERCIAL

## 2021-09-27 ENCOUNTER — OFFICE VISIT (OUTPATIENT)
Dept: PRIMARY CARE CLINIC | Age: 56
End: 2021-09-27
Payer: COMMERCIAL

## 2021-09-27 ENCOUNTER — HOSPITAL ENCOUNTER (OUTPATIENT)
Age: 56
Discharge: HOME | End: 2021-09-29
Payer: COMMERCIAL

## 2021-09-27 ENCOUNTER — HOSPITAL ENCOUNTER (EMERGENCY)
Age: 56
Discharge: HOME | End: 2021-09-27
Attending: EMERGENCY MEDICINE
Payer: COMMERCIAL

## 2021-09-27 ENCOUNTER — APPOINTMENT (OUTPATIENT)
Dept: MRI IMAGING | Age: 56
End: 2021-09-27
Payer: COMMERCIAL

## 2021-09-27 VITALS
TEMPERATURE: 97.7 F | HEIGHT: 62 IN | WEIGHT: 228 LBS | DIASTOLIC BLOOD PRESSURE: 90 MMHG | BODY MASS INDEX: 41.96 KG/M2 | SYSTOLIC BLOOD PRESSURE: 162 MMHG | RESPIRATION RATE: 16 BRPM | OXYGEN SATURATION: 100 % | HEART RATE: 56 BPM

## 2021-09-27 VITALS
OXYGEN SATURATION: 98 % | DIASTOLIC BLOOD PRESSURE: 98 MMHG | HEART RATE: 114 BPM | SYSTOLIC BLOOD PRESSURE: 148 MMHG | BODY MASS INDEX: 41.96 KG/M2 | RESPIRATION RATE: 14 BRPM | WEIGHT: 228 LBS | HEIGHT: 62 IN

## 2021-09-27 VITALS — HEIGHT: 62 IN | BODY MASS INDEX: 41.77 KG/M2 | WEIGHT: 227 LBS

## 2021-09-27 DIAGNOSIS — G89.29 CHRONIC MIDLINE LOW BACK PAIN WITH RIGHT-SIDED SCIATICA: ICD-10-CM

## 2021-09-27 DIAGNOSIS — S39.012A STRAIN OF LUMBAR REGION, INITIAL ENCOUNTER: Primary | ICD-10-CM

## 2021-09-27 DIAGNOSIS — M54.16 LUMBAR RADICULOPATHY: ICD-10-CM

## 2021-09-27 DIAGNOSIS — Z00.00 ENCOUNTER FOR GENERAL ADULT MEDICAL EXAMINATION W/O ABNORMAL FINDINGS: Primary | ICD-10-CM

## 2021-09-27 DIAGNOSIS — M54.41 CHRONIC MIDLINE LOW BACK PAIN WITH RIGHT-SIDED SCIATICA: ICD-10-CM

## 2021-09-27 DIAGNOSIS — Z12.31 VISIT FOR SCREENING MAMMOGRAM: ICD-10-CM

## 2021-09-27 LAB
ABSOLUTE EOS #: 0.4 K/UL (ref 0–0.4)
ABSOLUTE IMMATURE GRANULOCYTE: ABNORMAL K/UL (ref 0–0.3)
ABSOLUTE LYMPH #: 1.5 K/UL (ref 1–4.8)
ABSOLUTE MONO #: 0.5 K/UL (ref 0.1–1.2)
ANION GAP SERPL CALCULATED.3IONS-SCNC: 9 MMOL/L (ref 9–17)
BASOPHILS # BLD: 1 % (ref 0–2)
BASOPHILS ABSOLUTE: 0 K/UL (ref 0–0.2)
BUN BLDV-MCNC: 20 MG/DL (ref 6–20)
BUN/CREAT BLD: ABNORMAL (ref 9–20)
CALCIUM SERPL-MCNC: 9.4 MG/DL (ref 8.6–10.4)
CHLORIDE BLD-SCNC: 107 MMOL/L (ref 98–107)
CO2: 24 MMOL/L (ref 20–31)
CREAT SERPL-MCNC: 1 MG/DL (ref 0.5–0.9)
DIFFERENTIAL TYPE: ABNORMAL
EOSINOPHILS RELATIVE PERCENT: 5 % (ref 1–4)
GFR AFRICAN AMERICAN: >60 ML/MIN
GFR NON-AFRICAN AMERICAN: 58 ML/MIN
GFR SERPL CREATININE-BSD FRML MDRD: ABNORMAL ML/MIN/{1.73_M2}
GFR SERPL CREATININE-BSD FRML MDRD: ABNORMAL ML/MIN/{1.73_M2}
GLUCOSE BLD-MCNC: 96 MG/DL (ref 70–99)
HCT VFR BLD CALC: 44.5 % (ref 36–46)
HEMOGLOBIN: 14.6 G/DL (ref 12–16)
IMMATURE GRANULOCYTES: ABNORMAL %
LYMPHOCYTES # BLD: 22 % (ref 24–44)
MCH RBC QN AUTO: 29.1 PG (ref 26–34)
MCHC RBC AUTO-ENTMCNC: 32.8 G/DL (ref 31–37)
MCV RBC AUTO: 88.8 FL (ref 80–100)
MONOCYTES # BLD: 7 % (ref 2–11)
NRBC AUTOMATED: ABNORMAL PER 100 WBC
PDW BLD-RTO: 15.1 % (ref 12.5–15.4)
PLATELET # BLD: 197 K/UL (ref 140–450)
PLATELET ESTIMATE: ABNORMAL
PMV BLD AUTO: 9.8 FL (ref 6–12)
POTASSIUM SERPL-SCNC: 4.5 MMOL/L (ref 3.7–5.3)
RBC # BLD: 5.01 M/UL (ref 4–5.2)
RBC # BLD: ABNORMAL 10*6/UL
SEG NEUTROPHILS: 65 % (ref 36–66)
SEGMENTED NEUTROPHILS ABSOLUTE COUNT: 4.5 K/UL (ref 1.8–7.7)
SODIUM BLD-SCNC: 140 MMOL/L (ref 135–144)
WBC # BLD: 6.9 K/UL (ref 3.5–11)
WBC # BLD: ABNORMAL 10*3/UL

## 2021-09-27 PROCEDURE — 6360000004 HC RX CONTRAST MEDICATION: Performed by: EMERGENCY MEDICINE

## 2021-09-27 PROCEDURE — A9579 GAD-BASE MR CONTRAST NOS,1ML: HCPCS | Performed by: EMERGENCY MEDICINE

## 2021-09-27 PROCEDURE — 96374 THER/PROPH/DIAG INJ IV PUSH: CPT

## 2021-09-27 PROCEDURE — 77067 SCR MAMMO BI INCL CAD: CPT

## 2021-09-27 PROCEDURE — 6360000002 HC RX W HCPCS: Performed by: EMERGENCY MEDICINE

## 2021-09-27 PROCEDURE — 80048 BASIC METABOLIC PNL TOTAL CA: CPT

## 2021-09-27 PROCEDURE — 36415 COLL VENOUS BLD VENIPUNCTURE: CPT

## 2021-09-27 PROCEDURE — 72100 X-RAY EXAM L-S SPINE 2/3 VWS: CPT

## 2021-09-27 PROCEDURE — 72158 MRI LUMBAR SPINE W/O & W/DYE: CPT

## 2021-09-27 PROCEDURE — 85025 COMPLETE CBC W/AUTO DIFF WBC: CPT

## 2021-09-27 PROCEDURE — 99396 PREV VISIT EST AGE 40-64: CPT | Performed by: NURSE PRACTITIONER

## 2021-09-27 PROCEDURE — 99284 EMERGENCY DEPT VISIT MOD MDM: CPT

## 2021-09-27 RX ORDER — KETOROLAC TROMETHAMINE 15 MG/ML
15 INJECTION, SOLUTION INTRAMUSCULAR; INTRAVENOUS ONCE
Status: COMPLETED | OUTPATIENT
Start: 2021-09-27 | End: 2021-09-27

## 2021-09-27 RX ORDER — BUPROPION HYDROCHLORIDE 200 MG/1
200 TABLET, EXTENDED RELEASE ORAL DAILY
Qty: 180 TABLET | Refills: 3 | Status: SHIPPED | OUTPATIENT
Start: 2021-09-27 | End: 2021-09-29 | Stop reason: SDUPTHER

## 2021-09-27 RX ORDER — MORPHINE SULFATE 4 MG/ML
4 INJECTION, SOLUTION INTRAMUSCULAR; INTRAVENOUS ONCE
Status: DISCONTINUED | OUTPATIENT
Start: 2021-09-27 | End: 2021-09-27 | Stop reason: HOSPADM

## 2021-09-27 RX ORDER — HYDROCODONE BITARTRATE AND ACETAMINOPHEN 5; 325 MG/1; MG/1
1 TABLET ORAL EVERY 6 HOURS PRN
COMMUNITY

## 2021-09-27 RX ADMIN — KETOROLAC TROMETHAMINE 15 MG: 15 INJECTION, SOLUTION INTRAMUSCULAR; INTRAVENOUS at 14:09

## 2021-09-27 RX ADMIN — GADOTERIDOL 20 ML: 279.3 INJECTION, SOLUTION INTRAVENOUS at 13:15

## 2021-09-27 ASSESSMENT — ENCOUNTER SYMPTOMS
ABDOMINAL PAIN: 0
ALLERGIC/IMMUNOLOGIC NEGATIVE: 1
COUGH: 0
BACK PAIN: 1
SHORTNESS OF BREATH: 0
BACK PAIN: 1
NAUSEA: 1
EYES NEGATIVE: 1
RESPIRATORY NEGATIVE: 1

## 2021-09-27 ASSESSMENT — PAIN DESCRIPTION - FREQUENCY
FREQUENCY: CONTINUOUS

## 2021-09-27 ASSESSMENT — PAIN DESCRIPTION - LOCATION
LOCATION: BACK;LEG;GROIN
LOCATION: BACK
LOCATION: BACK

## 2021-09-27 ASSESSMENT — PAIN SCALES - GENERAL
PAINLEVEL_OUTOF10: 6
PAINLEVEL_OUTOF10: 8
PAINLEVEL_OUTOF10: 8
PAINLEVEL_OUTOF10: 4

## 2021-09-27 ASSESSMENT — PAIN DESCRIPTION - PROGRESSION
CLINICAL_PROGRESSION: GRADUALLY IMPROVING
CLINICAL_PROGRESSION: GRADUALLY WORSENING

## 2021-09-27 ASSESSMENT — PAIN DESCRIPTION - DESCRIPTORS: DESCRIPTORS: SHARP;SHOOTING;CONSTANT

## 2021-09-27 ASSESSMENT — PAIN DESCRIPTION - PAIN TYPE
TYPE: CHRONIC PAIN
TYPE: ACUTE PAIN;CHRONIC PAIN
TYPE: ACUTE PAIN;CHRONIC PAIN

## 2021-09-27 ASSESSMENT — PAIN DESCRIPTION - DIRECTION: RADIATING_TOWARDS: DOWN BOTH LEGS TO KNEES

## 2021-09-27 ASSESSMENT — PAIN DESCRIPTION - ORIENTATION
ORIENTATION: LOWER
ORIENTATION: RIGHT;LEFT;LOWER

## 2021-09-27 ASSESSMENT — PAIN DESCRIPTION - ONSET: ONSET: ON-GOING

## 2021-09-27 NOTE — ED PROVIDER NOTES
[facility]  [address] [facility] [address]  Phone: [phone]  Fax: [place] [address]458      Pt Name: Louise Aponte  MRN: [id number]  [place] 1965  Date of evaluation: 9/27/2021    CHIEF COMPLAINT       Chief Complaint   Patient presents with    Back Pain       HISTORY OF PRESENT ILLNESS    Louise Aponte is a 49 y.o. female who presents to the emergency department a 4-week history of worsening low back pain with radiculopathy. Sent by her doctor for concern for cauda equina syndrome. Had an episode of urinary incontinence 3 days ago. Admits to burning sensation bilateral thighs. No leg weakness. Pain 8 out of 10 worse with movement. Denies fevers or chills. No past history of back problems. Scheduled to see pain management for the first time tomorrow. Was here earlier in the day as an outpatient for mammogram and lumbar x-rays. REVIEW OF SYSTEMS       Constitutional: No fevers or chills   HEENT: No sore throat, rhinorrhea, or earache   Eyes: No blurry vision or double vision no drainage   Cardiovascular: No chest pain or tachycardia   Respiratory: No wheezing or shortness of breath no cough   Gastrointestinal: No nausea, vomiting, diarrhea, constipation, or abdominal pain   : No hematuria or dysuria positive urinary incontinence  Musculoskeletal: Positive low back pain  Skin: No rash   Neurological: No focal neurologic complaints, paresthesias, weakness, or headache     PAST MEDICAL HISTORY    has a past medical history of Anxiety, Chronic back pain, Depression, Hypertension, Mass of adrenal gland ([address] Obesity, Solitary left kidney, Transient gluten sensitivity, and WPW (Emiliana-Parkinson-White syndrome). SURGICAL HISTORY      has a past surgical history that includes Tonsillectomy; Appendectomy; Arm Surgery; ablation of dysrhythmic focus;  Upper gastrointestinal endoscopy (N/A, 2/21/2019); and Colonoscopy (N/A, 2019). CURRENT MEDICATIONS       Previous Medications    ALBUTEROL SULFATE  (90 BASE) MCG/ACT INHALER    Inhale 2 puffs into the lungs every 6 hours as needed for Wheezing or Shortness of Breath    BUPROPION (WELLBUTRIN SR) 200 MG EXTENDED RELEASE TABLET    Take 1 tablet by mouth daily    METHOCARBAMOL (ROBAXIN-750) 750 MG TABLET    Take 1 tablet by mouth 3 times daily as needed (back spasm)    METOPROLOL TARTRATE (LOPRESSOR) 50 MG TABLET    Take 1 tablet by mouth 2 times daily       ALLERGIES     is allergic to gluten meal and pcn [penicillins]. FAMILY HISTORY     She indicated that her mother is . She indicated that the status of her father is unknown. She indicated that her sister is alive. family history includes Cancer in her mother; Diabetes in her father and sister; Ovarian Cancer in her sister; Stroke in her mother. SOCIAL HISTORY      reports that she has been smoking cigarettes. She has a 5.00 pack-year smoking history. She has never used smokeless tobacco. She reports current drug use. Frequency: 7.00 times per week. Drug: Marijuana. She reports that she does not drink alcohol. PHYSICAL EXAM       ED Triage Vitals [21 1145]   BP Temp Temp Source Pulse Resp SpO2 Height Weight   (!) 182/108 97.7 °F (36.5 °C) Oral 55 18 96 % 5' 2\" (1.575 m) 228 lb (103.4 kg)       Constitutional: Alert, oriented x3, nontoxic, answering questions appropriately, acting properly for age, in no acute distress   HEENT: Extraocular muscles intact,  Neck: Trachea midline   Cardiovascular: Regular rhythm and rate no S3, S4, or murmurs   Respiratory: Clear to auscultation bilaterally no wheezes, rhonchi, rales, no respiratory distress no tachypnea no retractions no hypoxia  Gastrointestinal: Soft, nontender, nondistended, positive bowel sounds. No rebound, rigidity, or guarding. Musculoskeletal: Lumbar spine exquisite tenderness palpation L3 through the sacrum. No deformity.   No erythema or ecchymosis. Decreased range of motion  Neurologic: No saddle anesthesia. Extensor hallucis longus pulse 5/5 bilaterally. Skin: Warm and dry       DIFFERENTIAL DIAGNOSIS/ MDM:     MRI. Pain control. IV labs.     DIAGNOSTIC RESULTS     EKG: All EKG's are interpreted by the Emergency Department Physician who either signs or Co-signs this chart in the absence of a cardiologist.        Not indicated unless otherwise documented above    LABS:  Results for orders placed or performed during the hospital encounter of 09/27/21   CBC Auto Differential   Result Value Ref Range    WBC 6.9 3.5 - 11.0 k/uL    RBC 5.01 4.0 - 5.2 m/uL    Hemoglobin 14.6 12.0 - 16.0 g/dL    Hematocrit 44.5 36 - 46 %    MCV 88.8 80 - 100 fL    MCH 29.1 26 - 34 pg    MCHC 32.8 31 - 37 g/dL    RDW 15.1 12.5 - 15.4 %    Platelets 240 651 - 145 k/uL    MPV 9.8 6.0 - 12.0 fL    NRBC Automated NOT REPORTED per 100 WBC    Differential Type NOT REPORTED     Seg Neutrophils 65 36 - 66 %    Lymphocytes 22 (L) 24 - 44 %    Monocytes 7 2 - 11 %    Eosinophils % 5 (H) 1 - 4 %    Basophils 1 0 - 2 %    Immature Granulocytes NOT REPORTED 0 %    Segs Absolute 4.50 1.8 - 7.7 k/uL    Absolute Lymph # 1.50 1.0 - 4.8 k/uL    Absolute Mono # 0.50 0.1 - 1.2 k/uL    Absolute Eos # 0.40 0.0 - 0.4 k/uL    Basophils Absolute 0.00 0.0 - 0.2 k/uL    Absolute Immature Granulocyte NOT REPORTED 0.00 - 0.30 k/uL    WBC Morphology NOT REPORTED     RBC Morphology NOT REPORTED     Platelet Estimate NOT REPORTED    Basic Metabolic Panel   Result Value Ref Range    Glucose 96 70 - 99 mg/dL    BUN 20 6 - 20 mg/dL    CREATININE 1.00 (H) 0.50 - 0.90 mg/dL    Bun/Cre Ratio NOT REPORTED 9 - 20    Calcium 9.4 8.6 - 10.4 mg/dL    Sodium 140 135 - 144 mmol/L    Potassium 4.5 3.7 - 5.3 mmol/L    Chloride 107 98 - 107 mmol/L    CO2 24 20 - 31 mmol/L    Anion Gap 9 9 - 17 mmol/L    GFR Non-African American 58 (L) >60 mL/min    GFR African American >60 >60 mL/min    GFR Comment          GFR Staging NOT REPORTED        Not indicated unless otherwise documented above    RADIOLOGY:   I reviewed the radiologist interpretations:    MRI LUMBAR [address]   Final Result   1. No fracture or bony destructive lesion. 2. Degenerative changes. No significant central canal or lateral recess   stenosis. 3.  Multilevel neural foraminal stenoses, worst (moderate to severe) at L5-S1.   4. Impingement of the right L2 nerve and bilateral L5 nerves by   extraforaminal disc bulge and disc osteophyte complexes, respectively. Not indicated unless otherwise documented above    EMERGENCY DEPARTMENT COURSE:     The patient was given the following medications:  Orders Placed This Encounter   Medications    morphine injection 4 mg    gadoteridol (PROHANCE) injection 20 mL    ketorolac (TORADOL) injection 15 mg        Vitals:   -------------------------  BP (!) 162/90   Pulse 56   Temp 97.7 °F (36.5 °C) (Oral)   Resp 16   Ht 5' 2\" (1.575 m)   Wt 103.4 kg (228 lb)   SpO2 100%   BMI 41.70 kg/m²     2:50 PM and given Toradol ultimately after not wanting the morphine. MRI shows multilevel disc disease but no cauda equina syndrome. Patient is feeling better after Toradol she has Norco and muscle relaxer at home and is scheduled to see pain management tomorrow. She will be discharged and return if worsening symptoms or other concerns. The patient understands that at this time there is no evidence for a more malignant underlying process, but also understands that early in the process of an illness or injury, an emergency department workup can be falsely reassuring. Routine discharge counseling was given, and it is understood that worsening, changing or persistent symptoms should prompt an immediate call or follow up with their primary physician or return to the emergency department. The importance of appropriate follow up was also discussed. I have reviewed the disposition diagnosis.   I have answered the questions and given discharge instructions. There was voiced understanding of these instructions and no further questions or complaints. CRITICAL CARE:    None    CONSULTS:    None    PROCEDURES:    None      OARRS Report if indicated             FINAL IMPRESSION      1. Strain of lumbar region, initial encounter    2.  Lumbar radiculopathy          DISPOSITION/PLAN   DISPOSITION Decision To Discharge 09/27/2021 02:47:48 PM        CONDITION ON DISPOSITION: STABLE       PATIENT REFERRED TO:  Pain management    Go in 1 day  as scheduled      DISCHARGE MEDICATIONS:  New Prescriptions    No medications on file       (Please note that portions of this note were completed with a voice recognition program.  Efforts were made to edit the dictations but occasionally words are mis-transcribed.)    Lieutenant Jonathan DO   Attending Emergency Physician      Lieutenant Jonathan DO  09/27/21 1178

## 2021-09-27 NOTE — LETTER
[facility]  [address]  [facility] [address]  Phone: [phone]  Fax: [phone]    DK Samaniego CNP        September 27, 2021     Patient: Franci Jones   YOB: 1965   Date of Visit: 9/27/2021       To Whom It May Concern: It is my medical opinion that Viri Jaeger should remain out of work until 10/3/21. May return 10/4/21 without restrictions. If you have any questions or concerns, please don't hesitate to call.     Sincerely,        DK Samaniego CNP

## 2021-09-27 NOTE — PROGRESS NOTES
[facility] Pain Management  Patient Pain Assessment  Consultation - Dr. Chirag Galeas    Primary Care Physician: DK Das CNP    Chief complaint:   Chief Complaint   Patient presents with    Lower Back Pain   . Royer Nguyen is a 49 y.o. female evaluated on 9/28/2021. Patient is referred by DK Freeman CNP      Patient identification was verified at the start of the visit: Yes    Chief complaint: Royer Nguyen is 49 y.o.,  female, with  Lower Back Pain  . HISTORY OF PRESENT ILLNESS:  Royer Nguyen is 49 y.o. female with    Referring diagnosis  M25.532 (ICD-10-CM) - Left wrist pain  M25.512, G89.29 (ICD-10-CM) - Chronic left shoulder pain  M54.41, G89.29 (ICD-10-CM) - Chronic midline low back pain with right-sided sciatica    Patient is a 79-year-old female referred to the pain clinic in consultation for back pain with pain radiating to the right lower extremity of longstanding duration. Positive for THC in March - states she last used [facility] 2 days ago  Has had back pain on and off for past 20 years. Has seen pain management about 10 years ago but not recently. States she had 3 injections at the [facility] clinic but is unsure of what type of procedure it was. She also saw pain management in [place] either before or after these injections and received Vicodin there   Has recently become extremely uncomfortable for her. She is tearful throughout the visit. States about 1-2 months ago, was having significant back spasms. For the past 3 weeks, has had worse back pain without spasms and pain radiating down the legs which switches back and forth. Sometimes radiates down to knee, is currently radiating down left leg all the way to foot. She states she has sensitivity to light touch like her clothing touching her. Describes the pain as feeling like her \"back/tailbone wants to break in half\"   Pain does wake her up from sleep.  Is getting 5-7 hours of applicable  Lost Rx/pills: Not applicable  Taking more medication than prescribed: Not applicable  Are you receiving PAIN medications from other doctors:  Yes  Last Urine/Serum Drug Screen: will collect if needed  Was Serum/UDS as anticipated? 3/2021- not as anticipated  Brought pill bottles in not applicable  Was Pill count appropriate? : not applicable  Are currently pregnant? No  Recent ER visits: Yes 9/27/21 for back pain  Have you had a COVID 19 Vaccine? No   Total SOAP points:8    Past Medical History      Diagnosis Date    Anxiety     Chronic back pain     Depression     Hypertension     Mass of adrenal gland ([address]     Obesity     Solitary left kidney     Transient gluten sensitivity 02/21/2019    WPW (Emiliana-Parkinson-White syndrome) 2009       Surgical History  Past Surgical History:   Procedure Laterality Date    ABLATION OF DYSRHYTHMIC FOCUS      x 2-3 due to WPW    APPENDECTOMY      ARM SURGERY      COLONOSCOPY N/A 2/21/2019    COLONOSCOPY POLYPECTOMY SNARE/COLD BIOPSY performed by Mode Barber MD at [facility] N/A 2/21/2019    EGD BIOPSY performed by Mode Barber MD at [address] [place] [address] Thong Dang       Medications  No current facility-administered medications for this encounter. Current Outpatient Medications   Medication Sig Dispense Refill    buPROPion (WELLBUTRIN SR) 200 MG extended release tablet Take 1 tablet by mouth daily 180 tablet 3    HYDROcodone-acetaminophen (NORCO) 5-325 MG per tablet Take 1 tablet by mouth every 6 hours as needed for Pain.       albuterol sulfate  (90 Base) MCG/ACT inhaler Inhale 2 puffs into the lungs every 6 hours as needed for Wheezing or Shortness of Breath 1 Inhaler 3    methocarbamol (ROBAXIN-750) 750 MG tablet Take 1 tablet by mouth 3 times daily as needed (back spasm) 30 tablet 0    metoprolol tartrate (LOPRESSOR) 50 MG tablet Take 1 tablet by mouth 2 times daily 180 tablet 1     Facility-Administered Medications Ordered in Other Encounters   Medication Dose Route Frequency Provider Last Rate Last Admin    HYDROcodone-acetaminophen ([address]) 5-325 MG per tablet 1 tablet  1 tablet Oral Once Cindye Clau, DO           Allergies  Gluten meal and Pcn [penicillins]    Family History  family history includes Cancer in her mother; Diabetes in her father and sister; Ovarian Cancer in her sister; Stroke in her mother. Social History  Social History     Socioeconomic History    Marital status: Single     Spouse name: None    Number of children: None    Years of education: None    Highest education level: None   Occupational History    None   Tobacco Use    Smoking status: Current Every Day Smoker     Packs/day: 0.50     Years: 10.00     Pack years: 5.00     Types: Cigarettes    Smokeless tobacco: Never Used    Tobacco comment: decreasing with wellbutrin, currently about 3 cigarettes per day   Vaping Use    Vaping Use: Never used   Substance and Sexual Activity    Alcohol use: No    Drug use: Yes     Frequency: 7.0 times per week     Types: Marijuana     Comment: helps with back pain 9/27/21 2x per week    Sexual activity: Never     Partners: Female   Other Topics Concern    None   Social History Narrative    None     Social Determinants of Health     Financial Resource Strain: Medium Risk    Difficulty of Paying Living Expenses: Somewhat hard   Food Insecurity: No Food Insecurity    Worried About Running Out of Food in the Last Year: Never true    Bryson of Food in the Last Year: Never true   Transportation Needs:     Lack of Transportation (Medical):      Lack of Transportation (Non-Medical):    Physical Activity:     Days of Exercise per Week:     Minutes of Exercise per Session:    Stress:     Feeling of Stress :    Social Connections:     Frequency of Communication with Friends and Family:     Frequency of Social Gatherings with Friends and Family:     Attends Sabianist Services:     Conjunctivae normal.      Pupils: Pupils are equal, round, and reactive to light. Cardiovascular:      Rate and Rhythm: Normal rate and regular rhythm. Pulses: Normal pulses. Pulmonary:      Effort: Pulmonary effort is normal. No respiratory distress. Abdominal:      General: There is no distension. Tenderness: There is no abdominal tenderness. Musculoskeletal:      Cervical back: No rigidity. Lumbar back: Positive right straight leg raise test and positive left straight leg raise test.      Right lower leg: No edema. Left lower leg: No edema. Lymphadenopathy:      Cervical: No cervical adenopathy. Skin:     Findings: No rash. Comments: Red--Areas of pain  Black-Well healed surgical Scar  Yellow-areas of sensory changes   Neurological:      Mental Status: She is alert and oriented to person, place, and time. Cranial Nerves: Cranial nerves are intact. No cranial nerve deficit. Sensory: Sensation is intact. No sensory deficit. Motor: No weakness. Deep Tendon Reflexes:      Reflex Scores:       Patellar reflexes are 1+ on the right side and 1+ on the left side. Achilles reflexes are 1+ on the right side and 1+ on the left side. Psychiatric:         Mood and Affect: Affect is tearful. Speech: Speech normal.         Behavior: Behavior normal.         Cognition and Memory: Cognition normal.      Right Ankle Exam     Muscle Strength   The patient has normal right ankle strength. Left Ankle Exam     Muscle Strength   The patient has normal left ankle strength. Right Knee Exam     Muscle Strength   The patient has normal right knee strength. Left Knee Exam     Muscle Strength   The patient has normal left knee strength. Right Hip Exam     Muscle Strength   The patient has normal right hip strength. Left Hip Exam     Muscle Strength   The patient has normal left hip strength.        Back Exam     Tenderness   The patient is experiencing tenderness in the lumbar and sacroiliac. Range of Motion   Back extension: Limited and painful. Back flexion: Limited and painful. Back lateral bend right: Limited and painful. Back lateral bend left: Limited and painful. Back rotation right: Limited and painful. Back rotation left: Limited and painful. Muscle Strength   Back normal muscle strength: limited by pain. Right Quadriceps:  4/5   Left Quadriceps:  4/5   Right Hamstrings:  4/5   Left Hamstrings:  4/5     Tests   Straight leg raise right: positive  Straight leg raise left: positive    Other   Sensation: normal  Gait: normal   Scars: absent    Comments:  Allodynia of left thigh and lateral lower leg. Exquisitely tender to palpation of lumbar/SI region. Nurses Notes and Vital Signs reviewed.     DATA  Labs:  3/6/2021  7:50 AM - Ricky, Hayley Incoming Lab Results From [facility]    Component Value Ref Range & Units Status Collected Lab   Amphetamine Screen, Ur NEGATIVE  NEGATIVE Final 03/06/2021  7:30 AM [facility]          (Positive cutoff 1000 ng/mL)                Barbiturate Screen, Ur NEGATIVE  NEGATIVE Final 03/06/2021  7:30 AM [facility]          (Positive cutoff 200 ng/mL)                Benzodiazepine Screen, Urine NEGATIVE  NEGATIVE Final 03/06/2021  7:30 AM [facility]          (Positive cutoff 200 ng/mL)                Cocaine Metabolite, Urine NEGATIVE  NEGATIVE Final 03/06/2021  7:30 AM [facility]          (Positive cutoff 300 ng/mL)                Methadone Screen, Urine NEGATIVE  NEGATIVE Final 03/06/2021  7:30 AM [facility]          (Positive cutoff 300 ng/mL)                Opiates, Urine NEGATIVE  NEGATIVE Final 03/06/2021  7:30 AM [facility]          (Positive cutoff 300 ng/mL)                Phencyclidine, Urine NEGATIVE  NEGATIVE Final 03/06/2021  7:30 AM [facility]          (Positive cutoff 25 ng/mL)                Propoxyphene, Urine NOT REPORTED  NEGATIVE Final 03/06/2021  7:30 AM [facility]   Cannabinoid Scrn, Ur POSITIVEAbnormal   NEGATIVE Final 03/06/2021  7:30 AM [facility]               9/27/2021 12:25 PM - Ricky, Merrypn Incoming Lab Results From [facility]    Component Value Ref Range & Units Status Collected Lab   Glucose 96  70 - 99 mg/dL Final 09/27/2021 12:05 PM [facility]   BUN 20  6 - 20 mg/dL Final 09/27/2021 12:05 PM [facility]   CREATININE 1. 00High   0.50 - 0.90 mg/dL Final 09/27/2021 12:05 PM [facility]   Bun/Cre Ratio NOT REPORTED  9 - 20 Final 09/27/2021 12:05 PM [facility]   Calcium 9.4  8.6 - 10.4 mg/dL Final 09/27/2021 12:05 PM [facility]   Sodium 140  135 - 144 mmol/L Final 09/27/2021 12:05 PM [facility]   Potassium 4.5  3.7 - 5.3 mmol/L Final 09/27/2021 12:05 PM [facility]   Chloride 107  98 - 107 mmol/L Final 09/27/2021 12:05 PM [facility]   CO2 24  20 - 31 mmol/L Final 09/27/2021 12:05 PM [facility]   Anion Gap 9  9 - 17 mmol/L Final 09/27/2021 12:05 PM [facility]   GFR Non- 58Low   >60 mL/min Final 09/27/2021 12:05 PM [facility]   GFR African American >60  >60 mL/min Final 09/27/2021 12:05 PM [facility]   GFR Comment                 Imaging:  Radiology Images and Reports reviewed where indicated and necessary  EXAMINATION:   MRI OF THE LUMBAR SPINE WITHOUT AND WITH CONTRAST  9/27/2021 12:26 pm       TECHNIQUE:   Multiplanar multisequence MRI of the lumbar spine was performed without and   with the administration of intravenous contrast.       COMPARISON:   None.       HISTORY:   ORDERING SYSTEM PROVIDED HISTORY: back pain with urinary incontinence   TECHNOLOGIST PROVIDED HISTORY:   back pain with urinary incontinence   Decision Support Exception - unselect if not a suspected or confirmed   emergency medical condition->Emergency Medical Condition (MA)   Is the patient pregnant?->No   Reason for Exam: back pain with urinary incontinence   Acuity: Acute   Type of Exam: Unknown   Additional signs and symptoms: Acute back pain, new onset urinary incontinence       FINDINGS:   BONES/ALIGNMENT: There is normal alignment of the spine. The vertebral body   heights are maintained. The bone marrow signal appears unremarkable.       SPINAL CORD:  The conus terminates normally.       SOFT TISSUES: No abnormal enhancement is seen of the lumbar spine.  No   paraspinal mass identified.       L1-L2: Tiny disc protrusion.  No significant central canal, lateral recess or   neural foraminal stenoses.       L2-L3: Small right foraminal and extraforaminal disc protrusion.  No   significant central canal or lateral recess stenosis.  Mild-to-moderate right   neural foraminal stenosis.  Mild impingement of the right L2 nerve in the   extraforaminal space by the disc protrusion       L3-L4: Disc desiccation without herniation.  No significant central canal,   lateral recess or neural foraminal stenoses.       L4-L5: Minimal disc bulge.  Mild facet and ligamentous hypertrophy.  No   significant central canal or lateral recess stenosis.  Mild neural foraminal   stenoses.       L5-S1: Moderate loss of disc height with small disc osteophyte complex.  Mild   facet and ligamentous hypertrophy.  Moderate to severe bilateral neural   foraminal stenoses.  Impingement of bilateral L5 nerves in the extraforaminal   spaces by disc osteophyte complexes.       MISCELLANEOUS: A normal right kidney is not visualized.  Within the right   renal bed, there are small cysts, which may be associated with atrophic right   kidney.  The partially visualized left kidney appears grossly unremarkable.           Impression   1.  No fracture or bony destructive lesion. 2. Degenerative changes.  No significant central canal or lateral recess   stenosis.    3.  Multilevel neural foraminal stenoses, worst (moderate to severe) at L5-S1.   4. Impingement of the right L2 nerve and bilateral L5 nerves by   extraforaminal disc bulge and disc osteophyte complexes, respectively. EXAMINATION:   THREE XRAY VIEWS OF THE LUMBAR SPINE       9/27/2021 8:06 am       COMPARISON:   1 October 2019       HISTORY:   ORDERING SYSTEM PROVIDED HISTORY: Chronic midline low back pain with   right-sided sciatica   TECHNOLOGIST PROVIDED HISTORY:   Reason for Exam: low back pain   Acuity: Chronic   Type of Exam: Initial   Additional signs and symptoms: radiates to BLE       FINDINGS:   AP upright, lateral and coned lateral views of the lumbar spine are   electronically labeled regarding sides.  5 non rib-bearing lumbar vertebrae. Marginal osteophytes.  Vertebral bodies are intact without evidence of   fracture.  Decreased intervertebral disc spaces L4-L5 and L5-S1.  Slight   retrolisthesis of L5 on S1.           Impression   Stable mild-moderate lumbar spondylosis. Patient Active Problem List   Diagnosis    Essential hypertension    Hyperlipidemia, mixed    Hypothyroidism, adult    Nonalcoholic fatty liver disease without nonalcoholic steatohepatitis (LEIVA)    Obesity (BMI 35.0-39.9 without comorbidity)    Vasomotor symptoms due to menopause    Adrenal adenoma    Diarrhea    Rectal bleeding    Nausea    Transient gluten sensitivity    Current smoker    Lumbago with sciatica    Palpitations    Postural dizziness with near syncope    WPW (Emiilana-Parkinson-White syndrome)    Hypertensive urgency    Chest pain    Depression        ASSESSMENT    Franci Party is a 49 y.o. female with     1. Lumbar radiculopathy    2. Lumbar degenerative disc disease    3. Lumbar spondylosis           PLAN  Patient's   [] x-ray    [] CT scan    [x] MRI  Were/was  Reviewed. These findings are consistent with the patient's symptoms and physical examination.       [] Patient's findings on the x-ray were explained to the patient using a bone modal.    Other reports reviewed include    [] Bone scan   [] EMG and nerve conduction studies   [] Referral reports-  I also discussed with him the following treatment options Including advantages and disadvantages of each:    [] Physical therapy    [] Interventional pain treatment    [] Medication management    [] Surgical options    Patient's OARRS were reviewed. It is acceptable and appears patient is not receiving prescriptions from multiple prescribers. Patient is  forthcoming regarding prescriptions for pain medication in the past  Controlled Substances Monitoring: The following screens were also reviewed  SOAPP- the score is 8. (Values:cates patient is  <4minimal potential  4-7 Moderate potential  >7 High potential  for drug addiction  Counselling/Preventive measures for pain  Control:    [x]  Spine strengthening exercises are discussed with patient in detail. Patient is counseled on importance of exercise and,core strengthening. Some  specific exercises to strengthen the abdominal muscles and low back muscles Were discussed. Also aquatic (water) physical therapy and benefits were explained to patient. including \" Water supports the body and minimizes the effect of gravity, making it easier for patients to start an exercise program.\"   The following important principles were discussed with patient:  1. Limit Bed Rest--Studies show that people with short-term low-back pain who rest feel more pain and have a harder time with daily tasks than those who stay active. 2. Keep Exercising-patient is advised to stay away from strenuous activities like gardening and avoid whatever motion caused the pain in the first place.   3. Maintain Good Posture-Exercises  to maintain good posture were shown to patient. 4. To do exercises learned in PT regularly. []Information (handout) on exercise was  given to patient. [x]  Patient is counseled about  ill effects of smoking for 7 minutes -Patient is strongly urged to quit smoking   Following health benefits were discussed:  People who stop smoking greatly reduce their risk for disease and premature death. Lowered risk for lung cancer and many other types of cancer. Reduced risk for coronary heart disease, stroke, and peripheral vascular disease. Reduced coronary heart disease risk within 1 to 2 years of quitting. Reduced respiratory symptoms, such as coughing, wheezing, and shortness of breath. The rate of decline in lung function is slower among people who quit smoking than among those who continue to smoke. Reduced risk of developing chronic obstructive pulmonary disease (COPD), one of the leading causes of death in the [place]. Reduced risk for infertility in women of reproductive age. Women who stop smoking during pregnancy also reduce their risk of having a low birth weight baby. Reduced risk of bone and joint damage. Methods to Quit Smoking  The majority of cigarette smokers quit without using evidence-based   Counseling and medication are both effective for treating tobacco dependence, and using them together is more effective than using either one alone. Patient is advised to follow up with his physician for further management. The following treatment plan was developed after discussion with patient:    We discussed Lumbar Epidural steroid Injections x 1  at L5-S1. Patient tried and failed NSAIDS,Home exercises, Physical Therapy, Chiropractic manipulations without relief. Patient exhibited signs of radiculopathy with positive straight leg raising test on bilaterally    Patient has not had prior Lumbar Surgery. We will see the patient in 2 weeks after the procedures and re-evaluate symptoms.     Orders Placed This Encounter   Procedures    Lumbar Epidural Steroid Injection/Caudal     Standing Status:   Future     Standing Expiration Date:   9/28/2022    FLUORO FOR SURGICAL PROCEDURES     Standing Status:   Future     Standing Expiration Date:   9/28/2022    Saline lock IV     Standing Status:   Future     Standing Expiration Date:   3/28/2023       Decision Making Process :

## 2021-09-27 NOTE — PROGRESS NOTES
09/22/21 130/78   08/25/21 (!) 170/102          ([id number])    Past Medical History:   Diagnosis Date    Anxiety     Chronic back pain     Depression     Hypertension     Mass of adrenal gland ([address]     Obesity     Solitary left kidney     Transient gluten sensitivity 02/21/2019    WPW (Emiliana-Parkinson-White syndrome) 2009      Past Surgical History:   Procedure Laterality Date    ABLATION OF DYSRHYTHMIC FOCUS      x 2-3 due to WPW    APPENDECTOMY      ARM SURGERY      COLONOSCOPY N/A 2/21/2019    COLONOSCOPY POLYPECTOMY SNARE/COLD BIOPSY performed by Rosendo Castro MD at [address] ENDOSCOPY N/A 2/21/2019    EGD BIOPSY performed by Rosendo Castro MD at [address] History   Problem Relation Age of Onset    Stroke Mother     Cancer Mother         bladder    Diabetes Father     Diabetes Sister     Ovarian Cancer Sister           Social History     Tobacco Use    Smoking status: Current Every Day Smoker     Packs/day: 0.50     Years: 10.00     Pack years: 5.00     Types: Cigarettes    Smokeless tobacco: Never Used    Tobacco comment: decreasing with wellbutrin, currently about 3 cigarettes per day   Substance Use Topics    Alcohol use: No      Current Outpatient Medications   Medication Sig Dispense Refill    buPROPion (WELLBUTRIN SR) 200 MG extended release tablet Take 1 tablet by mouth daily 180 tablet 3    metoprolol tartrate (LOPRESSOR) 50 MG tablet Take 1 tablet by mouth 2 times daily 180 tablet 1    albuterol sulfate  (90 Base) MCG/ACT inhaler Inhale 2 puffs into the lungs every 6 hours as needed for Wheezing or Shortness of Breath 1 Inhaler 3    methocarbamol (ROBAXIN-750) 750 MG tablet Take 1 tablet by mouth 3 times daily as needed (back spasm) 30 tablet 0     No current facility-administered medications for this visit.      Allergies   Allergen Reactions    Gluten Meal      Abdominal pain    Pcn [Penicillins] Hives Health Maintenance   Topic Date Due    Hepatitis C screen  Never done    HIV screen  Never done    TSH testing  06/20/2020    DTaP/Tdap/Td vaccine (1 - Tdap) 10/13/2021 (Originally 3/31/1986)    Flu vaccine (1) 06/30/2022 (Originally 9/1/2021)    COVID-19 Vaccine (1) 08/16/2022 (Originally 3/31/1979)    Pneumococcal 0-64 years Vaccine (1 of 2 - PPSV23) 08/17/2022 (Originally 3/31/1973)    Shingles Vaccine (1 of 2) 08/25/2022 (Originally 3/31/2017)    Potassium monitoring  03/06/2022    Creatinine monitoring  03/06/2022    Breast cancer screen  09/27/2023    Cervical cancer screen  06/27/2024    Lipid screen  03/06/2026    Colon cancer screen colonoscopy  02/21/2029    Hepatitis A vaccine  Aged Out    Hepatitis B vaccine  Aged Out    Hib vaccine  Aged Out    Meningococcal (ACWY) vaccine  Aged Out       Subjective:      Review of Systems   Constitutional: Negative for chills, fatigue and fever. HENT: Negative for congestion. Respiratory: Negative for cough and shortness of breath. Cardiovascular: Negative for chest pain and palpitations. Gastrointestinal: Negative for abdominal pain. Genitourinary: Negative for dysuria. Loss of bladder control   Musculoskeletal: Positive for back pain. Neurological: Negative for dizziness, numbness and headaches. Psychiatric/Behavioral: Positive for sleep disturbance. Negative for self-injury and suicidal ideas. The patient is nervous/anxious. Objective:     Physical Exam  Vitals and nursing note reviewed. Constitutional:       Appearance: She is well-developed. HENT:      Head: Normocephalic and atraumatic. Eyes:      Pupils: Pupils are equal, round, and reactive to light. Cardiovascular:      Rate and Rhythm: Normal rate and regular rhythm. Heart sounds: Normal heart sounds. Pulmonary:      Effort: Pulmonary effort is normal.      Breath sounds: Normal breath sounds.    Abdominal:      General: Bowel sounds are normal.      Palpations: Abdomen is soft. Tenderness: There is no abdominal tenderness. Musculoskeletal:         General: Normal range of motion. Cervical back: Normal range of motion and neck supple. Skin:     General: Skin is warm and dry. Neurological:      Mental Status: She is alert and oriented to person, place, and time. Psychiatric:         Behavior: Behavior normal.         Thought Content: Thought content normal.         Judgment: Judgment normal.       BP (!) 148/98   Pulse 114   Resp 14   Ht 5' 2\" (1.575 m)   Wt 228 lb (103.4 kg)   SpO2 98%   Breastfeeding No   BMI 41.70 kg/m²     Assessment:       Diagnosis Orders   1. Encounter for general adult medical examination w/o abnormal findings     2. Chronic midline low back pain with right-sided sciatica               Plan:      Return in about 3 months (around 12/27/2021) for recheck. 1. HM- mammogram completed and WNL. Needs to have labs checked, unable to do so today d/t pain. Encouraged diet/exercise. Continue current meds. Follow up in 3 months for recheck/earlier if needed. 2. Chronic lower back pain- Worsening acutely. D/t symptoms and lack of pain control recommend ER eval now, she states an understanding. Follow up after ER visit. 3. Depression- Increase wellbutrin dose, follow up in 3 months for recheck/earlier if needed. Orders Placed This Encounter   Medications    buPROPion (WELLBUTRIN SR) 200 MG extended release tablet     Sig: Take 1 tablet by mouth daily     Dispense:  180 tablet     Refill:  3       Patient given educational materials - see patient instructions. Discussed use, benefit, and side effects of prescribed medications. All patientquestions answered. Pt voiced understanding. Reviewed health maintenance. Instructedto continue current medications, diet and exercise. Patient agreed with treatmentplan. Follow up as directed.      Electronicallysigned by DK Major - JOSE ALFREDO on 9/27/2021 at 11:18 AM

## 2021-09-28 ENCOUNTER — HOSPITAL ENCOUNTER (OUTPATIENT)
Dept: PAIN MANAGEMENT | Age: 56
Discharge: HOME | End: 2021-09-28
Payer: COMMERCIAL

## 2021-09-28 ENCOUNTER — APPOINTMENT (OUTPATIENT)
Dept: CT IMAGING | Age: 56
End: 2021-09-28
Payer: COMMERCIAL

## 2021-09-28 ENCOUNTER — HOSPITAL ENCOUNTER (EMERGENCY)
Age: 56
Discharge: HOME | End: 2021-09-28
Attending: EMERGENCY MEDICINE
Payer: COMMERCIAL

## 2021-09-28 ENCOUNTER — TELEPHONE (OUTPATIENT)
Dept: PRIMARY CARE CLINIC | Age: 56
End: 2021-09-28

## 2021-09-28 VITALS
WEIGHT: 228 LBS | HEART RATE: 82 BPM | TEMPERATURE: 97.7 F | OXYGEN SATURATION: 97 % | BODY MASS INDEX: 41.96 KG/M2 | SYSTOLIC BLOOD PRESSURE: 156 MMHG | HEIGHT: 62 IN | DIASTOLIC BLOOD PRESSURE: 100 MMHG | RESPIRATION RATE: 20 BRPM

## 2021-09-28 VITALS
SYSTOLIC BLOOD PRESSURE: 151 MMHG | RESPIRATION RATE: 18 BRPM | HEART RATE: 90 BPM | OXYGEN SATURATION: 96 % | DIASTOLIC BLOOD PRESSURE: 117 MMHG | WEIGHT: 227.96 LBS | TEMPERATURE: 99.4 F | BODY MASS INDEX: 41.95 KG/M2 | HEIGHT: 62 IN

## 2021-09-28 DIAGNOSIS — M54.16 LUMBAR RADICULOPATHY: Primary | ICD-10-CM

## 2021-09-28 DIAGNOSIS — M51.36 LUMBAR DEGENERATIVE DISC DISEASE: ICD-10-CM
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
## 2021-09-28 DIAGNOSIS — M47.816 LUMBAR SPONDYLOSIS: ICD-10-CM

## 2021-09-28 DIAGNOSIS — M62.838 CERVICAL PARASPINAL MUSCLE SPASM: ICD-10-CM

## 2021-09-28 PROCEDURE — 6370000000 HC RX 637 (ALT 250 FOR IP): Performed by: EMERGENCY MEDICINE

## 2021-09-28 PROCEDURE — 70450 CT HEAD/BRAIN W/O DYE: CPT

## 2021-09-28 PROCEDURE — 99203 OFFICE O/P NEW LOW 30 MIN: CPT

## 2021-09-28 PROCEDURE — 99244 OFF/OP CNSLTJ NEW/EST MOD 40: CPT | Performed by: PAIN MEDICINE

## 2021-09-28 PROCEDURE — 72125 CT NECK SPINE W/O DYE: CPT

## 2021-09-28 PROCEDURE — 99283 EMERGENCY DEPT VISIT LOW MDM: CPT

## 2021-09-28 RX ORDER — LIDOCAINE 50 MG/G
1 PATCH TOPICAL DAILY
Qty: 10 PATCH | Refills: 0 | Status: SHIPPED | OUTPATIENT
Start: 2021-09-28

## 2021-09-28 RX ORDER — HYDROCODONE BITARTRATE AND ACETAMINOPHEN 5; 325 MG/1; MG/1
1 TABLET ORAL ONCE
Status: COMPLETED | OUTPATIENT
Start: 2021-09-28 | End: 2021-09-28

## 2021-09-28 RX ADMIN — HYDROCODONE BITARTRATE AND ACETAMINOPHEN 1 TABLET: 5; 325 TABLET ORAL at 20:29

## 2021-09-28 ASSESSMENT — PAIN - FUNCTIONAL ASSESSMENT: PAIN_FUNCTIONAL_ASSESSMENT: PREVENTS OR INTERFERES SOME ACTIVE ACTIVITIES AND ADLS

## 2021-09-28 ASSESSMENT — PAIN DESCRIPTION - FREQUENCY: FREQUENCY: INTERMITTENT

## 2021-09-28 ASSESSMENT — ENCOUNTER SYMPTOMS
COUGH: 0
EYE PAIN: 0
SORE THROAT: 0
ABDOMINAL PAIN: 0
SHORTNESS OF BREATH: 0

## 2021-09-28 ASSESSMENT — PAIN DESCRIPTION - LOCATION: LOCATION: HEAD;NECK;BACK

## 2021-09-28 ASSESSMENT — PAIN SCALES - GENERAL
PAINLEVEL_OUTOF10: 8
PAINLEVEL_OUTOF10: 4

## 2021-09-28 ASSESSMENT — PAIN DESCRIPTION - DESCRIPTORS: DESCRIPTORS: ACHING

## 2021-09-28 ASSESSMENT — PAIN DESCRIPTION - PROGRESSION: CLINICAL_PROGRESSION: GRADUALLY WORSENING

## 2021-09-28 ASSESSMENT — PAIN DESCRIPTION - PAIN TYPE: TYPE: ACUTE PAIN

## 2021-09-28 NOTE — TELEPHONE ENCOUNTER
Called the patient for a check-in. Discussed the followin. Patient has not yet received the first packet sent in the mail; has not looked into insurance The patient provided verbal consent for the writer to share identifying information/ contact information with Carlos Telles at [address] [place] [address].    2. Patient has not yet reached out to mental health organizations. She did indicate willingness to enroll soon     3. Rental assistance. The patient was referred to call [employer] and/or the [employer]. The writer will be mailing the paper application for [employer]; patient was instructed to  application for [employer] and provided address. 4. Disability benefits. Patient was provided with relevant information to contact social security. Information will also be mailed to patient with a comparison chart of SSI v. SSDI for reference. 5. 30 day medical cert was signed by doc. Patient was instructed to contact [facility] and alert them of the certificate       Patient requested that the writer check- in again next week. Patient was informed to expect a call Wednesday or Thursday.      Igor Almanzar
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
## 2021-09-28 NOTE — TELEPHONE ENCOUNTER
PT  stated that we need provider to sign the bottom of her 30 day Medical paper to prevent electrical shutoff. Stated that it was faxed to us yesterday.  Form can be faxed to [phone]
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
## 2021-09-29 ENCOUNTER — APPOINTMENT (OUTPATIENT)
Dept: CT IMAGING | Age: 56
End: 2021-09-29
Payer: COMMERCIAL

## 2021-09-29 ENCOUNTER — HOSPITAL ENCOUNTER (EMERGENCY)
Age: 56
Discharge: HOME | End: 2021-09-29
Attending: EMERGENCY MEDICINE
Payer: COMMERCIAL

## 2021-09-29 VITALS
SYSTOLIC BLOOD PRESSURE: 172 MMHG | RESPIRATION RATE: 22 BRPM | HEART RATE: 95 BPM | DIASTOLIC BLOOD PRESSURE: 94 MMHG | TEMPERATURE: 97.9 F
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
## 2021-09-29 DIAGNOSIS — R07.89 CHEST WALL PAIN: Primary | ICD-10-CM

## 2021-09-29 LAB
ABSOLUTE EOS #: 0.3 K/UL (ref 0–0.4)
ABSOLUTE IMMATURE GRANULOCYTE: ABNORMAL K/UL (ref 0–0.3)
ABSOLUTE LYMPH #: 1.5 K/UL (ref 1–4.8)
ABSOLUTE MONO #: 0.4 K/UL (ref 0.1–1.2)
ANION GAP SERPL CALCULATED.3IONS-SCNC: 13 MMOL/L (ref 9–17)
BASOPHILS # BLD: 1 % (ref 0–2)
BASOPHILS ABSOLUTE: 0.1 K/UL (ref 0–0.2)
BUN BLDV-MCNC: 18 MG/DL (ref 6–20)
BUN/CREAT BLD: ABNORMAL (ref 9–20)
CALCIUM SERPL-MCNC: 9.4 MG/DL (ref 8.6–10.4)
CHLORIDE BLD-SCNC: 103 MMOL/L (ref 98–107)
CO2: 24 MMOL/L (ref 20–31)
CREAT SERPL-MCNC: 0.91 MG/DL (ref 0.5–0.9)
DIFFERENTIAL TYPE: ABNORMAL
EOSINOPHILS RELATIVE PERCENT: 4 % (ref 1–4)
GFR AFRICAN AMERICAN: >60 ML/MIN
GFR NON-AFRICAN AMERICAN: >60 ML/MIN
GFR SERPL CREATININE-BSD FRML MDRD: ABNORMAL ML/MIN/{1.73_M2}
GFR SERPL CREATININE-BSD FRML MDRD: ABNORMAL ML/MIN/{1.73_M2}
GLUCOSE BLD-MCNC: 98 MG/DL (ref 70–99)
HCT VFR BLD CALC: 43.9 % (ref 36–46)
HEMOGLOBIN: 14.7 G/DL (ref 12–16)
IMMATURE GRANULOCYTES: ABNORMAL %
LYMPHOCYTES # BLD: 20 % (ref 24–44)
MCH RBC QN AUTO: 29.5 PG (ref 26–34)
MCHC RBC AUTO-ENTMCNC: 33.4 G/DL (ref 31–37)
MCV RBC AUTO: 88.3 FL (ref 80–100)
MONOCYTES # BLD: 5 % (ref 2–11)
NRBC AUTOMATED: ABNORMAL PER 100 WBC
PDW BLD-RTO: 14.8 % (ref 12.5–15.4)
PLATELET # BLD: 188 K/UL (ref 140–450)
PLATELET ESTIMATE: ABNORMAL
PMV BLD AUTO: 9.6 FL (ref 6–12)
POTASSIUM SERPL-SCNC: 3.9 MMOL/L (ref 3.7–5.3)
RBC # BLD: 4.97 M/UL (ref 4–5.2)
RBC # BLD: ABNORMAL 10*6/UL
SEG NEUTROPHILS: 70 % (ref 36–66)
SEGMENTED NEUTROPHILS ABSOLUTE COUNT: 5.1 K/UL (ref 1.8–7.7)
SODIUM BLD-SCNC: 140 MMOL/L (ref 135–144)
TOTAL CK: 143 U/L (ref 26–192)
TROPONIN INTERP: NORMAL
TROPONIN T: NORMAL NG/ML
TROPONIN, HIGH SENSITIVITY: 9 NG/L (ref 0–14)
WBC # BLD: 7.3 K/UL (ref 3.5–11)
WBC # BLD: ABNORMAL 10*3/UL

## 2021-09-29 PROCEDURE — 2580000003 HC RX 258: Performed by: EMERGENCY MEDICINE

## 2021-09-29 PROCEDURE — 85025 COMPLETE CBC W/AUTO DIFF WBC: CPT

## 2021-09-29 PROCEDURE — 6360000004 HC RX CONTRAST MEDICATION: Performed by: EMERGENCY MEDICINE

## 2021-09-29 PROCEDURE — 6360000002 HC RX W HCPCS: Performed by: EMERGENCY MEDICINE

## 2021-09-29 PROCEDURE — 80048 BASIC METABOLIC PNL TOTAL CA: CPT

## 2021-09-29 PROCEDURE — 96375 TX/PRO/DX INJ NEW DRUG ADDON: CPT

## 2021-09-29 PROCEDURE — 96374 THER/PROPH/DIAG INJ IV PUSH: CPT

## 2021-09-29 PROCEDURE — 36415 COLL VENOUS BLD VENIPUNCTURE: CPT

## 2021-09-29 PROCEDURE — 6360000002 HC RX W HCPCS

## 2021-09-29 PROCEDURE — 71275 CT ANGIOGRAPHY CHEST: CPT

## 2021-09-29 PROCEDURE — 99283 EMERGENCY DEPT VISIT LOW MDM: CPT

## 2021-09-29 PROCEDURE — 93005 ELECTROCARDIOGRAM TRACING: CPT | Performed by: EMERGENCY MEDICINE

## 2021-09-29 PROCEDURE — 84484 ASSAY OF TROPONIN QUANT: CPT

## 2021-09-29 PROCEDURE — 82550 ASSAY OF CK (CPK): CPT

## 2021-09-29 RX ORDER — ONDANSETRON 2 MG/ML
4 INJECTION INTRAMUSCULAR; INTRAVENOUS ONCE
Status: COMPLETED | OUTPATIENT
Start: 2021-09-29 | End: 2021-09-29

## 2021-09-29 RX ORDER — BUPROPION HYDROCHLORIDE 200 MG/1
200 TABLET, EXTENDED RELEASE ORAL 2 TIMES DAILY
Qty: 180 TABLET | Refills: 3 | Status: SHIPPED | OUTPATIENT
Start: 2021-09-29

## 2021-09-29 RX ORDER — 0.9 % SODIUM CHLORIDE 0.9 %
80 INTRAVENOUS SOLUTION INTRAVENOUS ONCE
Status: COMPLETED | OUTPATIENT
Start: 2021-09-29 | End: 2021-09-29

## 2021-09-29 RX ORDER — SODIUM CHLORIDE 0.9 % (FLUSH) 0.9 %
10 SYRINGE (ML) INJECTION PRN
Status: DISCONTINUED | OUTPATIENT
Start: 2021-09-29 | End: 2021-09-29 | Stop reason: HOSPADM

## 2021-09-29 RX ORDER — ONDANSETRON 2 MG/ML
INJECTION INTRAMUSCULAR; INTRAVENOUS
Status: COMPLETED
Start: 2021-09-29 | End: 2021-09-29

## 2021-09-29 RX ADMIN — ONDANSETRON 4 MG: 2 INJECTION INTRAMUSCULAR; INTRAVENOUS at 10:28

## 2021-09-29 RX ADMIN — SODIUM CHLORIDE, PRESERVATIVE FREE 10 ML: 5 INJECTION INTRAVENOUS at 10:48

## 2021-09-29 RX ADMIN — IOPAMIDOL 100 ML: 755 INJECTION, SOLUTION INTRAVENOUS at 10:48

## 2021-09-29 RX ADMIN — HYDROMORPHONE HYDROCHLORIDE 1 MG: 1 INJECTION, SOLUTION INTRAMUSCULAR; INTRAVENOUS; SUBCUTANEOUS at 10:27

## 2021-09-29 RX ADMIN — SODIUM CHLORIDE 80 ML: 9 INJECTION, SOLUTION INTRAVENOUS at 10:48

## 2021-09-29 ASSESSMENT — PAIN DESCRIPTION - PAIN TYPE: TYPE: ACUTE PAIN

## 2021-09-29 ASSESSMENT — PAIN DESCRIPTION - LOCATION: LOCATION: CHEST

## 2021-09-29 ASSESSMENT — PAIN SCALES - GENERAL
PAINLEVEL_OUTOF10: 8
PAINLEVEL_OUTOF10: 8
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
## 2021-09-30 ENCOUNTER — HOSPITAL ENCOUNTER (OUTPATIENT)
Dept: PAIN MANAGEMENT | Age: 56
Discharge: HOME | End: 2021-09-30
Payer: COMMERCIAL

## 2021-09-30 ENCOUNTER — HOSPITAL ENCOUNTER (OUTPATIENT)
Dept: GENERAL RADIOLOGY | Age: 56
Discharge: HOME | End: 2021-10-02
Payer: COMMERCIAL

## 2021-09-30 VITALS
OXYGEN SATURATION: 97 % | DIASTOLIC BLOOD PRESSURE: 73 MMHG | HEART RATE: 54 BPM | SYSTOLIC BLOOD PRESSURE: 109 MMHG | TEMPERATURE: 97.7 F | RESPIRATION RATE: 16 BRPM

## 2021-09-30 DIAGNOSIS — M54.16 LUMBAR RADICULOPATHY: ICD-10-CM

## 2021-09-30 DIAGNOSIS — M51.36 LUMBAR DEGENERATIVE DISC DISEASE: ICD-10-CM

## 2021-09-30 DIAGNOSIS — M47.816 LUMBAR SPONDYLOSIS: ICD-10-CM

## 2021-09-30 LAB
EKG ATRIAL RATE: 63 BPM
EKG P AXIS: 32 DEGREES
EKG P-R INTERVAL: 150 MS
EKG Q-T INTERVAL: 456 MS
EKG QRS DURATION: 96 MS
EKG QTC CALCULATION (BAZETT): 466 MS
EKG R AXIS: 44 DEGREES
EKG T AXIS: 50 DEGREES
EKG VENTRICULAR RATE: 63 BPM

## 2021-09-30 PROCEDURE — 62323 NJX INTERLAMINAR LMBR/SAC: CPT | Performed by: PAIN MEDICINE

## 2021-09-30 PROCEDURE — 3209999900 FLUORO FOR SURGICAL PROCEDURES

## 2021-09-30 PROCEDURE — 6360000002 HC RX W HCPCS

## 2021-09-30 PROCEDURE — 62323 NJX INTERLAMINAR LMBR/SAC: CPT

## 2021-09-30 PROCEDURE — 6360000004 HC RX CONTRAST MEDICATION

## 2021-09-30 RX ORDER — MIDAZOLAM HYDROCHLORIDE 1 MG/ML
INJECTION INTRAMUSCULAR; INTRAVENOUS
Status: COMPLETED | OUTPATIENT
Start: 2021-09-30 | End: 2021-09-30

## 2021-09-30 RX ORDER — TRIAMCINOLONE ACETONIDE 40 MG/ML
INJECTION, SUSPENSION INTRA-ARTICULAR; INTRAMUSCULAR
Status: COMPLETED | OUTPATIENT
Start: 2021-09-30 | End: 2021-09-30

## 2021-09-30 RX ADMIN — TRIAMCINOLONE ACETONIDE 80 MG: 40 INJECTION, SUSPENSION INTRA-ARTICULAR; INTRAMUSCULAR at 08:18

## 2021-09-30 RX ADMIN — IOHEXOL 1 ML: 180 INJECTION INTRAVENOUS at 08:18

## 2021-09-30 RX ADMIN — MIDAZOLAM 2 MG: 1 INJECTION INTRAMUSCULAR; INTRAVENOUS at 08:12

## 2021-09-30 ASSESSMENT — PAIN DESCRIPTION - ONSET: ONSET: ON-GOING

## 2021-09-30 ASSESSMENT — PAIN - FUNCTIONAL ASSESSMENT
PAIN_FUNCTIONAL_ASSESSMENT: 0-10
PAIN_FUNCTIONAL_ASSESSMENT: PREVENTS OR INTERFERES SOME ACTIVE ACTIVITIES AND ADLS
PAIN_FUNCTIONAL_ASSESSMENT: 0-10

## 2021-09-30 ASSESSMENT — PAIN DESCRIPTION - DIRECTION: RADIATING_TOWARDS: BILATERAL LEGS TO KNEES

## 2021-09-30 ASSESSMENT — PAIN DESCRIPTION - ORIENTATION: ORIENTATION: RIGHT;LEFT;LOWER

## 2021-09-30 ASSESSMENT — PAIN DESCRIPTION - LOCATION: LOCATION: BACK

## 2021-09-30 ASSESSMENT — PAIN DESCRIPTION - PROGRESSION: CLINICAL_PROGRESSION: NOT CHANGED

## 2021-09-30 ASSESSMENT — PAIN DESCRIPTION - PAIN TYPE: TYPE: CHRONIC PAIN

## 2021-09-30 ASSESSMENT — PAIN DESCRIPTION - DESCRIPTORS: DESCRIPTORS: ACHING;SHARP

## 2021-09-30 ASSESSMENT — PAIN DESCRIPTION - FREQUENCY: FREQUENCY: CONTINUOUS

## 2021-09-30 ASSESSMENT — PAIN SCALES - GENERAL: PAINLEVEL_OUTOF10: 4

## 2021-09-30 NOTE — PROCEDURES
Pre-Procedure Note    Patient Name: Luis Alfredo Baker   YOB: 1965  Room/Bed: Room/bed info not found  Medical Record Number: [id number]  Date: 9/30/2021       Indication:    1. Lumbar radiculopathy    2. Lumbar degenerative disc disease    3. Lumbar spondylosis        Consent: On file. Vital Signs:   Vitals:    09/30/21 0819   BP: 134/77   Pulse: 56   Resp: 20   Temp:    SpO2: 94%       Past Medical History:   has a past medical history of Anxiety, Chronic back pain, Depression, Hypertension, Mass of adrenal gland ([address] Obesity, Solitary left kidney, Transient gluten sensitivity, and WPW (Emiliana-Parkinson-White syndrome). Past Surgical History:   has a past surgical history that includes Tonsillectomy; Appendectomy; Arm Surgery; ablation of dysrhythmic focus; Upper gastrointestinal endoscopy (N/A, 2/21/2019); and Colonoscopy (N/A, 2/21/2019). Pre-Sedation Documentation and Exam:   Vital signs have been reviewed (see flow sheet for vitals). Mallampati Airway Assessment:  normal    ASA Classification:  Class 3 - A patient with severe systemic disease that limits activity but is not incapacitating    Sedation/ Anesthesia Plan:   intravenous sedation   as needed. Medications Planned:   midazolam (Versed) / Fentanyl  Intravenously  as needed. Patient is an appropriate candidate for plan of sedation: yes  Patient's History and Physical examination was reviewed and there is no change. Electronically signed by Seamus Law MD on 9/30/2021 at 8:26 AM        Preoperative Diagnosis:    1. Lumbar radiculopathy    2. Lumbar degenerative disc disease    3. Lumbar spondylosis        Postoperative Diagnosis:    1. Lumbar radiculopathy    2. Lumbar degenerative disc disease    3. Lumbar spondylosis        Procedure Performed:  Lumbar epidural steroid injection under fluoroscopy guidance with IV sedation. Indication for the Procedure:   The patient failed conservative management  for pain in the low back radiating to lower extremities. As the patient is not responding to conservative management and it is interfering with activities of daily living we decided to proceed with lumbar epidural steroid injection. The procedure and risks were discussed with the patient and an informed consent was obtained  Current Pain Assessment  Pain Assessment  Pain Assessment: 0-10  Pain Level: 4  Patient's Stated Pain Goal: No pain  Pain Type: Chronic pain  Pain Location: Back  Pain Orientation: Right, Left, Lower  Pain Radiating Towards: bilateral legs to knees  Pain Descriptors: Aching, Sharp  Pain Frequency: Continuous  Pain Onset: On-going  Clinical Progression: Not changed  Functional Pain Assessment: Prevents or interferes some active activities and ADLs  Non-Pharmaceutical Pain Intervention(s): Heat applied, Rest  POSS Score (Patient Ctrl Analgesia): 1     Procedure:    After starting an IV, the patient was sedated with 2 mg of Midazolam and 0 mcg of Fentanyl Intravenously by the RN under my direct supervision. Patient's vital signs including BP, EKG and SaO2 were monitored by the RN and they remained stable during the procedure. A meaningful communication was kept up with the patient throughout  the procedure. The patient is placed in prone position. Skin over the back was prepped and draped in sterile manner. Then using fluoroscopy the L5, S1 interspace was observed and the skin and deep tissues in the left paramedian area were infiltrated with 4 ml of 1% lidocaine. The #20-gauge, 3-1/2 inch Tuohy needle was inserted through the skin wheal and the epidural space was identified using loss of resistance technique with normal saline. This was confirmed with AP and lateral views using fluoroscopy after injecting about 1 ml of Omnipaque-180 and observing the spread of the contrast in the epidural space.    Then after negative aspiration a total of 80 mg of triamcinolone with 8 ml of normal saline was

## 2021-09-30 NOTE — PROGRESS NOTES
Discharge criteria met. Patient alert and oriented x3  Post procedure dressing dry and intact. Sensory and motor function intact as per pre-procedure. Instructions and follow up reviewed with pt at patient at discharge.   Patient discharged per wheelchair, gait steady, - Erlin @ [phone]

## 2021-10-01 ENCOUNTER — TELEPHONE (OUTPATIENT)
Dept: PAIN MANAGEMENT | Age: 56
End: 2021-10-01

## 2021-10-01 ENCOUNTER — TELEPHONE (OUTPATIENT)
Dept: PRIMARY CARE CLINIC | Age: 56
End: 2021-10-01

## 2021-10-01 ENCOUNTER — NURSE TRIAGE (OUTPATIENT)
Dept: OTHER | Facility: CLINIC | Age: 56
End: 2021-10-01
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
## 2021-10-01 NOTE — TELEPHONE ENCOUNTER
Please inform the patient that Vaishnavi Yoon is out of office until next week, she can get new letter 10/4 at her follow up if provider feels it is appropriate.
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
## 2021-10-04 ENCOUNTER — OFFICE VISIT (OUTPATIENT)
Dept: PRIMARY CARE CLINIC | Age: 56
End: 2021-10-04

## 2021-10-04 VITALS
OXYGEN SATURATION: 95 % | SYSTOLIC BLOOD PRESSURE: 168 MMHG | BODY MASS INDEX: 42.07 KG/M2 | HEART RATE: 57 BPM | HEIGHT: 62 IN | WEIGHT: 228.6 LBS | RESPIRATION RATE: 16 BRPM | DIASTOLIC BLOOD PRESSURE: 110 MMHG

## 2021-10-04 DIAGNOSIS — I45.6 WPW (WOLFF-PARKINSON-WHITE SYNDROME): ICD-10-CM

## 2021-10-04 DIAGNOSIS — I10 ESSENTIAL HYPERTENSION: ICD-10-CM

## 2021-10-04 DIAGNOSIS — R00.2 PALPITATIONS: Primary | ICD-10-CM

## 2021-10-04 DIAGNOSIS — H60.501 ACUTE OTITIS EXTERNA OF RIGHT EAR, UNSPECIFIED TYPE: ICD-10-CM

## 2021-10-04 PROCEDURE — 99214 OFFICE O/P EST MOD 30 MIN: CPT | Performed by: PHYSICIAN ASSISTANT

## 2021-10-04 RX ORDER — NEOMYCIN SULFATE, POLYMYXIN B SULFATE, HYDROCORTISONE 3.5; 10000; 1 MG/ML; [USP'U]/ML; MG/ML
2 SOLUTION/ DROPS AURICULAR (OTIC) EVERY 8 HOURS SCHEDULED
Qty: 1 EACH | Refills: 0 | Status: SHIPPED | OUTPATIENT
Start: 2021-10-04 | End: 2021-10-11

## 2021-10-04 RX ORDER — LOSARTAN POTASSIUM 25 MG/1
25 TABLET ORAL DAILY
Qty: 14 TABLET | Refills: 0 | Status: SHIPPED | OUTPATIENT
Start: 2021-10-04 | End: 2021-10-06

## 2021-10-04 ASSESSMENT — ENCOUNTER SYMPTOMS
VOMITING: 0
BACK PAIN: 0
COUGH: 0
NAUSEA: 0
DIARRHEA: 0
RHINORRHEA: 0
CONSTIPATION: 0
SHORTNESS OF BREATH: 0
SINUS PAIN: 0
ABDOMINAL PAIN: 0
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
## 2021-10-06 ENCOUNTER — TELEPHONE (OUTPATIENT)
Dept: PRIMARY CARE CLINIC | Age: 56
End: 2021-10-06

## 2021-10-06 DIAGNOSIS — I10 ESSENTIAL HYPERTENSION: ICD-10-CM

## 2021-10-06 RX ORDER — LOSARTAN POTASSIUM 50 MG/1
50 TABLET ORAL DAILY
Qty: 90 TABLET | Refills: 0 | Status: SHIPPED | OUTPATIENT
Start: 2021-10-06
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
## 2021-10-06 NOTE — TELEPHONE ENCOUNTER
Pt was in the office today for a BP check, she was receiving high readings at home. I took pt to a room & she performed a BP check with her machine on her left arm, she received a reading of 196/107. I asked pt what symptoms she was having and she reported she had chest pain/tightness, SOB, heart palpitations & SOB. I used a adult size BP cuff and obtained a reading of 180/100 from pts right arm. Advised PCP of results & she went in to see pt.

## 2021-10-07 DIAGNOSIS — I10 ESSENTIAL HYPERTENSION: ICD-10-CM

## 2021-10-08 RX ORDER — CLONIDINE HYDROCHLORIDE 0.1 MG/1
0.1 TABLET ORAL 2 TIMES DAILY
Qty: 60 TABLET | Refills: 3 | Status: SHIPPED | OUTPATIENT
Start: 2021-10-08

## 2021-10-13 PROBLEM — M47.817 LUMBOSACRAL SPONDYLOSIS WITHOUT MYELOPATHY: Status: ACTIVE | Noted: 2021-10-13

## 2021-10-13 PROBLEM — M51.36 DDD (DEGENERATIVE DISC DISEASE), LUMBAR: Status: ACTIVE | Noted: 2021-10-13

## 2021-10-13 PROBLEM — M51.369 DDD (DEGENERATIVE DISC DISEASE), LUMBAR: Status: ACTIVE | Noted: 2021-10-13
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
## 2021-10-19 ENCOUNTER — HOSPITAL ENCOUNTER (EMERGENCY)
Age: 56
Discharge: HOME | End: 2021-10-20
Attending: EMERGENCY MEDICINE

## 2021-10-19 VITALS
HEART RATE: 66 BPM | RESPIRATION RATE: 15 BRPM | BODY MASS INDEX: 41.96 KG/M2 | TEMPERATURE: 98.8 F | OXYGEN SATURATION: 96 % | WEIGHT: 228 LBS | SYSTOLIC BLOOD PRESSURE: 166 MMHG | HEIGHT: 62 IN | DIASTOLIC BLOOD PRESSURE: 87 MMHG

## 2021-10-19 DIAGNOSIS — L03.311 CELLULITIS, ABDOMINAL WALL: ICD-10-CM

## 2021-10-19 DIAGNOSIS — L02.211 CUTANEOUS ABSCESS OF ABDOMINAL WALL: Primary | ICD-10-CM

## 2021-10-19 PROCEDURE — 6360000002 HC RX W HCPCS: Performed by: EMERGENCY MEDICINE

## 2021-10-19 PROCEDURE — 2500000003 HC RX 250 WO HCPCS: Performed by: EMERGENCY MEDICINE

## 2021-10-19 PROCEDURE — 99283 EMERGENCY DEPT VISIT LOW MDM: CPT

## 2021-10-19 PROCEDURE — 90715 TDAP VACCINE 7 YRS/> IM: CPT | Performed by: EMERGENCY MEDICINE

## 2021-10-19 PROCEDURE — 6370000000 HC RX 637 (ALT 250 FOR IP): Performed by: EMERGENCY MEDICINE

## 2021-10-19 PROCEDURE — 10061 I&D ABSCESS COMP/MULTIPLE: CPT

## 2021-10-19 PROCEDURE — 90471 IMMUNIZATION ADMIN: CPT | Performed by: EMERGENCY MEDICINE

## 2021-10-19 RX ORDER — DOXYCYCLINE HYCLATE 100 MG
100 TABLET ORAL ONCE
Status: COMPLETED | OUTPATIENT
Start: 2021-10-19 | End: 2021-10-19

## 2021-10-19 RX ORDER — DOXYCYCLINE 100 MG/1
100 TABLET ORAL 2 TIMES DAILY
Qty: 14 TABLET | Refills: 0 | Status: SHIPPED | OUTPATIENT
Start: 2021-10-19 | End: 2021-10-26

## 2021-10-19 RX ORDER — HYDROCODONE BITARTRATE AND ACETAMINOPHEN 5; 325 MG/1; MG/1
1 TABLET ORAL ONCE
Status: COMPLETED | OUTPATIENT
Start: 2021-10-19 | End: 2021-10-19

## 2021-10-19 RX ORDER — LIDOCAINE HYDROCHLORIDE 10 MG/ML
20 INJECTION, SOLUTION INFILTRATION; PERINEURAL ONCE
Status: COMPLETED | OUTPATIENT
Start: 2021-10-19 | End: 2021-10-19

## 2021-10-19 RX ADMIN — TETANUS TOXOID, REDUCED DIPHTHERIA TOXOID AND ACELLULAR PERTUSSIS VACCINE, ADSORBED 0.5 ML: 5; 2.5; 8; 8; 2.5 SUSPENSION INTRAMUSCULAR at 23:16

## 2021-10-19 RX ADMIN — DOXYCYCLINE HYCLATE 100 MG: 100 TABLET, COATED ORAL at 23:15

## 2021-10-19 RX ADMIN — HYDROCODONE BITARTRATE AND ACETAMINOPHEN 1 TABLET: 5; 325 TABLET ORAL at 23:16

## 2021-10-19 RX ADMIN — LIDOCAINE HYDROCHLORIDE 20 ML: 10 INJECTION, SOLUTION INFILTRATION; PERINEURAL at 23:19

## 2021-10-19 ASSESSMENT — PAIN SCALES - GENERAL
PAINLEVEL_OUTOF10: 7

## 2021-10-19 ASSESSMENT — PAIN DESCRIPTION - LOCATION: LOCATION: ABDOMEN

## 2021-10-19 ASSESSMENT — PAIN DESCRIPTION - PAIN TYPE: TYPE: ACUTE PAIN

## 2021-10-19 NOTE — Clinical Note
Mario Alan was seen and treated in our emergency department on 10/19/2021. She may return to work on 10/22/2021. If you have any questions or concerns, please don't hesitate to call.       Radha Kunz, DO

## 2021-10-21 NOTE — ED PROVIDER NOTES
[id number] [facility] ED  [address] [address] [facility] [address] [facility] [address]  Phone: [phone]  Fax: [phone]      Pt Name: Josette Goss  [facility]:[id number]  Chantegffabricio 1965  Date of evaluation: 10/19/2021      CHIEF COMPLAINT       Chief Complaint   Patient presents with   [address]   Josette Goss is a 49 y.o. female a infection to her left abdominal wall. The patient reports that on 10/17/2021 she woke up with a painful, red, swollen area to her left abdomen and she thought that she was bit by a spider. She did not see any spiders or insects in her room. The patient states that since that time she has developed gradual onset, constant, progressive, redness, pain and swelling to the area without any drainage. The patient tried using tweezers to pull at the area but this made it worse. She has not taken any medications for her symptoms and does not list any palliating factors. She states that the pain is worse whenever she puts pressure over the area. She denies any history of recurrent skin infections. Her tetanus is not up-to-date. She denies fever, chills, headache, vision changes, neck pain, back pain, chest pain, shortness of breath, nausea, vomiting, urinary/bowel symptoms, recent injury or illness. REVIEW OF SYSTEMS     Ten point review of systems was reviewed and is negative unless otherwise noted in the HPI    [address]    has a past medical history of Anxiety, Chronic back pain, Depression, Hypertension, Mass of adrenal gland ([address] Obesity, Solitary left kidney, Transient gluten sensitivity, and WPW (Emiliana-Parkinson-White syndrome). SURGICAL HISTORY      has a past surgical history that includes Tonsillectomy; Appendectomy; Arm Surgery; ablation of dysrhythmic focus; Upper gastrointestinal endoscopy (N/A, 2/21/2019); and Colonoscopy (N/A, 2/21/2019).     CURRENT MEDICATIONS       Discharge Medication List as of 10/20/2021 12:00 AM CONTINUE these medications which have NOT CHANGED    Details   cloNIDine (CATAPRES) 0.1 MG tablet Take 1 tablet by mouth 2 times daily, Disp-60 tablet, R-3Normal      losartan (COZAAR) 50 MG tablet Take 1 tablet by mouth daily, Disp-90 tablet, R-0Normal      buPROPion (WELLBUTRIN SR) 200 MG extended release tablet Take 1 tablet by mouth 2 times daily, Disp-180 tablet, R-3Normal      lidocaine (LIDODERM) 5 % Place 1 patch onto the skin daily 12 hours on, 12 hours off., Disp-10 patch, R-0Normal      HYDROcodone-acetaminophen (NORCO) 5-325 MG per tablet Take 1 tablet by mouth every 6 hours as needed for Pain. Historical Med      metoprolol tartrate (LOPRESSOR) 50 MG tablet Take 1 tablet by mouth 2 times daily, Disp-180 tablet, R-1Normal      albuterol sulfate  (90 Base) MCG/ACT inhaler Inhale 2 puffs into the lungs every 6 hours as needed for Wheezing or Shortness of Breath, Disp-1 Inhaler, R-3Normal      methocarbamol (ROBAXIN-750) 750 MG tablet Take 1 tablet by mouth 3 times daily as needed (back spasm), Disp-30 tablet, R-0Normal             ALLERGIES     is allergic to gluten meal and pcn [penicillins]. FAMILY HISTORY     She indicated that her mother is . She indicated that the status of her father is unknown. She indicated that her sister is alive. family history includes Cancer in her mother; Diabetes in her father and sister; Ovarian Cancer in her sister; Stroke in her mother. SOCIAL HISTORY      reports that she has been smoking cigarettes. She has a 5.00 pack-year smoking history. She has never used smokeless tobacco. She reports current drug use. Frequency: 7.00 times per week. Drug: Marijuana. She reports that she does not drink alcohol. PHYSICAL EXAM     INITIAL VITALS:  height is 5' 2\" (1.575 m) and weight is 103.4 kg (228 lb). Her oral temperature is 98.8 °F (37.1 °C). Her blood pressure is 166/87 (abnormal) and her pulse is 66.  Her respiration is 15 and oxygen saturation is 96%.     CONSTITUTIONAL: no apparent distress, well appearing  SKIN: There is erythema, edema, and tenderness to palpation over a approximately 3 cm x 5 cm area of erythema to the left side abdomen. There is central induration and fluctuance. Bedside ultrasound reveals a fluid collection. No lymphangitic streaking. Skin otherwise warm, dry, no jaundice, hives or petechiae  EYES: clear conjunctiva, non-icteric sclera  HENT: normocephalic, atraumatic, moist mucus membranes  NECK: Nontender and supple with no nuchal rigidity, full range of motion  PULMONARY: clear to auscultation without wheezes, rhonchi, or rales, normal excursion, no accessory muscle use and no stridor  CARDIOVASCULAR: regular rate, rhythm. Strong radial pulses with intact distal perfusion. Capillary refill <2 seconds. GASTROINTESTINAL: soft, non-tender, non-distended, no palpable masses, no rebound or guarding   GENITOURINARY: No costovertebral angle tenderness to palpation  MUSCULOSKELETAL: No midline spinal tenderness, step off or deformity. Extremities are otherwise nontender to palpation and nonerythematous. Compartments soft. No peripheral edema. NEUROLOGIC: alert and oriented x 3, GCS 15, normal mentation and speech. Moves all extremities x 4 without motor or sensory deficit, gait is stable without ataxia  PSYCHIATRIC: normal mood and affect, thought process is clear and linear    DIAGNOSTIC RESULTS     EKG:  None    RADIOLOGY:   BEDSIDE SOFT TISSUE ULTRASOUND: A limited bedside ultrasound of the soft tissue was performed. The purpose of this study was to evaluate for fluid collection concerning for abscess. The structures studied was the skin and underlying soft tissue. FINDINGS:  Study was positive for left abdominal wall soft tissue fluid collection. Incision and drainage was was indicated. The study was technically adequate. LABS:  No results found for this visit on 10/19/21.     EMERGENCY DEPARTMENT COURSE:        The patient was given the following medications:  Orders Placed This Encounter   Medications    doxycycline hyclate (VIBRA-TABS) tablet 100 mg     Order Specific Question:   Antimicrobial Indications     Answer:   Skin and Soft Tissue Infection    Tetanus-Diphth-Acell Pertussis (BOOSTRIX) injection 0.5 mL    HYDROcodone-acetaminophen (NORCO) 5-325 MG per tablet 1 tablet    lidocaine 1 % injection 20 mL    doxycycline monohydrate (ADOXA) 100 MG tablet     Sig: Take 1 tablet by mouth 2 times daily for 7 days     Dispense:  14 tablet     Refill:  0        Vitals:    Vitals:    10/19/21 2153   BP: (!) 166/87   Pulse: 66   Resp: 15   Temp: 98.8 °F (37.1 °C)   TempSrc: Oral   SpO2: 96%   Weight: 103.4 kg (228 lb)   Height: 5' 2\" (1.575 m)     -------------------------  BP: (!) 166/87, Temp: 98.8 °F (37.1 °C), Pulse: 66, Resp: 15    CONSULTS:  None    CRITICAL CARE:   None    PROCEDURES:  Incision/Drainage    Date/Time: 10/21/2021 10:24 AM  Performed by: Irene Habermann, DO  Authorized by: Irene Habermann, DO     Consent:     Consent obtained:  Verbal    Consent given by:  Patient    Risks discussed:  Bleeding, incomplete drainage, pain, damage to other organs and infection    Alternatives discussed:  No treatment, delayed treatment, alternative treatment, observation and referral  Location:     Type:  Abscess    Location:  Trunk    Trunk location:  Abdomen  Pre-procedure details:     Skin preparation:  Chloraprep  Anesthesia (see MAR for exact dosages): Anesthesia method:  Local infiltration    Local anesthetic:  Lidocaine 1% w/o epi  Procedure type:     Complexity:  Simple  Procedure details:     Needle aspiration: no      Incision types:  Stab incision    Incision depth:  Dermal    Scalpel blade:  11    Wound management:  Probed and deloculated and irrigated with saline    Drainage:  Bloody and purulent    Drainage amount:   Moderate    Wound treatment:  Wound left open    Packing materials:  None  Post-procedure details:     Patient tolerance of procedure: Tolerated well, no immediate complications          DIAGNOSIS/ MDM:   Barb Lizarraga is a 49 y.o. female who presents with a abscess to her left side abdomen. Vital signs are stable. She has a abscess with induration and fluctuance to the left abdominal wall with fluid collection seen on bedside ultrasound. Her tetanus shot was updated. I anesthetized the area with lidocaine and performed a I&D with return of purulent bloody drainage. She was given Norco for pain. The patient was started on doxycycline and instructed to take her antibiotic as prescribed. She was instructed to take ibuprofen or Tylenol as needed for pain and to follow-up with her PCP in 1 to 2 days for wound check. Instructed her to return to the ER for worsening symptoms or any other concern. The patient understands that at this time there is no evidence for a more malignant underlying process, but also understands that early in the process of an illness or injury, an emergency department work-up can be falsely reassuring. Routine discharge counseling was given, and the patient understands that worsening, changing or persistent symptoms should prompt a immediate call or follow-up with their primary care physician or return to the emergency department. The importance of appropriate follow-up was also discussed. I have reviewed the disposition diagnosis with the patient. I have answered their questions and given discharge instructions. They voiced understanding of these instructions and did not have any further questions or complaints. FINAL IMPRESSION      1. Cutaneous abscess of abdominal wall    2.  Cellulitis, abdominal wall          DISPOSITION/PLAN   DISPOSITION Decision To Discharge 10/19/2021 11:58:36 PM        PATIENT REFERRED TO:  DK Herman - CNP  [address] [place] [phone]    Schedule an appointment as soon as possible for a visit in 2 days      [facility] ED  [address]   [address] [address]  [phone]  Go to   If symptoms worsen      DISCHARGE MEDICATIONS:  Discharge Medication List as of 10/20/2021 12:00 AM      START taking these medications    Details   doxycycline monohydrate (ADOXA) 100 MG tablet Take 1 tablet by mouth 2 times daily for 7 days, Disp-14 tablet, R-0Normal             (Please note that portions of this note were completed with a voice recognitionprogram.  Efforts were made to edit the dictations but occasionally words are mis-transcribed.)    Shalom Bautista DO DO  Emergency Physician Attending          Shalom Bautista DO  10/21/21 0167

## 2021-10-27 NOTE — TELEPHONE ENCOUNTER
Referral placed to Care Coordination. Will call patient tomorrow 10/28/21 for final check-in.      Jona Randhawa

## 2021-10-29 ENCOUNTER — CARE COORDINATION (OUTPATIENT)
Dept: CARE COORDINATION | Age: 56
End: 2021-10-29

## 2021-10-29 NOTE — TELEPHONE ENCOUNTER
Pt. Called to return VM from this morning. Patient has been very depressed the last few weeks. Her lights were never turned on regardless of the attempts to submit a medical cert. Pt.'s rental car had to be returned. Without a ride to work, she has taken a leave of absence from her job and will most likely quit. Friend provided a loan for the patient to use as a down payment on a new car. The writer informed the patient to check out [employer] for job placement and possible transportation opportunities. Also reminded of the patient to utilize [employer] during the open enrollment period for 2022 benefits. Patient said she might look into finding a job at [employer] working second shift. This way she can utilize her sister's car to get to work. Wished the patient good luck in her future endevous and reinforced the relationship she should build with Lionel Ortiz. The referral for [facility] has been closed.      Annie Rao

## 2021-10-29 NOTE — CARE COORDINATION
Was referred for care management by CHW to continue working with patient for insurance, financial issues. Left VM message with return contact info asking she call me back. Will call again next week. No future appointments.

## 2021-10-29 NOTE — CARE COORDINATION
Writer called Patient for an initial Social service call, no one answered the phone a voicemail was left asking for a return call.     Plan: Follow up with Patient to gauge her interest in working with our program.

## 2021-11-04 ENCOUNTER — CARE COORDINATION (OUTPATIENT)
Dept: CARE COORDINATION | Age: 56
End: 2021-11-04

## 2021-11-04 SDOH — HEALTH STABILITY: PHYSICAL HEALTH: ON AVERAGE, HOW MANY DAYS PER WEEK DO YOU ENGAGE IN MODERATE TO STRENUOUS EXERCISE (LIKE A BRISK WALK)?: 0 DAYS

## 2021-11-04 SDOH — ECONOMIC STABILITY: TRANSPORTATION INSECURITY
IN THE PAST 12 MONTHS, HAS THE LACK OF TRANSPORTATION KEPT YOU FROM MEDICAL APPOINTMENTS OR FROM GETTING MEDICATIONS?: NO

## 2021-11-04 SDOH — ECONOMIC STABILITY: HOUSING INSECURITY: IN THE LAST 12 MONTHS, HOW MANY PLACES HAVE YOU LIVED?: 1

## 2021-11-04 SDOH — ECONOMIC STABILITY: INCOME INSECURITY: IN THE LAST 12 MONTHS, WAS THERE A TIME WHEN YOU WERE NOT ABLE TO PAY THE MORTGAGE OR RENT ON TIME?: YES

## 2021-11-04 SDOH — HEALTH STABILITY: PHYSICAL HEALTH: ON AVERAGE, HOW MANY MINUTES DO YOU ENGAGE IN EXERCISE AT THIS LEVEL?: 0 MIN

## 2021-11-04 SDOH — ECONOMIC STABILITY: TRANSPORTATION INSECURITY
IN THE PAST 12 MONTHS, HAS LACK OF TRANSPORTATION KEPT YOU FROM MEETINGS, WORK, OR FROM GETTING THINGS NEEDED FOR DAILY LIVING?: YES

## 2021-11-04 SDOH — ECONOMIC STABILITY: HOUSING INSECURITY
IN THE LAST 12 MONTHS, WAS THERE A TIME WHEN YOU DID NOT HAVE A STEADY PLACE TO SLEEP OR SLEPT IN A SHELTER (INCLUDING NOW)?: NO

## 2021-11-04 ASSESSMENT — SOCIAL DETERMINANTS OF HEALTH (SDOH)
HOW OFTEN DO YOU ATTEND CHURCH OR RELIGIOUS SERVICES?: NEVER
DO YOU BELONG TO ANY CLUBS OR ORGANIZATIONS SUCH AS CHURCH GROUPS UNIONS, FRATERNAL OR ATHLETIC GROUPS, OR SCHOOL GROUPS?: NO
HOW OFTEN DO YOU ATTENT MEETINGS OF THE CLUB OR ORGANIZATION YOU BELONG TO?: NEVER
ARE YOU MARRIED, WIDOWED, DIVORCED, SEPARATED, NEVER MARRIED, OR LIVING WITH A PARTNER?: NEVER MARRIED
HOW OFTEN DO YOU GET TOGETHER WITH FRIENDS OR RELATIVES?: TWICE A WEEK
IN A TYPICAL WEEK, HOW MANY TIMES DO YOU TALK ON THE PHONE WITH FAMILY, FRIENDS, OR NEIGHBORS?: MORE THAN THREE TIMES A WEEK
HOW OFTEN DO YOU GET TOGETHER WITH FRIENDS OR RELATIVES?: TWICE A WEEK
IN A TYPICAL WEEK, HOW MANY TIMES DO YOU TALK ON THE PHONE WITH FAMILY, FRIENDS, OR NEIGHBORS?: MORE THAN THREE TIMES A WEEK
HOW OFTEN DO YOU ATTEND CHURCH OR RELIGIOUS SERVICES?: NEVER
DO YOU BELONG TO ANY CLUBS OR ORGANIZATIONS SUCH AS CHURCH GROUPS UNIONS, FRATERNAL OR ATHLETIC GROUPS, OR SCHOOL GROUPS?: NO

## 2021-11-04 ASSESSMENT — LIFESTYLE VARIABLES
HOW OFTEN DO YOU HAVE A DRINK CONTAINING ALCOHOL: MONTHLY OR LESS
HOW MANY STANDARD DRINKS CONTAINING ALCOHOL DO YOU HAVE ON A TYPICAL DAY: 1 OR 2
HOW OFTEN DO YOU HAVE A DRINK CONTAINING ALCOHOL: MONTHLY OR LESS
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
## 2021-11-05 ENCOUNTER — TELEPHONE (OUTPATIENT)
Dept: PRIMARY CARE CLINIC | Age: 56
End: 2021-11-05

## 2021-11-05 NOTE — TELEPHONE ENCOUNTER
Records request received from THE [facility]. Request emailed to HIM for completion.

## 2021-11-09 ENCOUNTER — CARE COORDINATION (OUTPATIENT)
Dept: CARE COORDINATION | Age: 56
End: 2021-11-09

## 2021-11-12 ENCOUNTER — CARE COORDINATION (OUTPATIENT)
Dept: CARE COORDINATION | Age: 56
End: 2021-11-12

## 2021-11-12 NOTE — CARE COORDINATION
Left VM message asking patient to call me back at [phone] for care management. Will call again next week. No future appointments.

## 2021-11-19 ENCOUNTER — CARE COORDINATION (OUTPATIENT)
Dept: CARE COORDINATION | Age: 56
End: 2021-11-19

## 2021-11-19 NOTE — CARE COORDINATION
Left VM message asking patient to call me back at [phone] for care management, check abdomen abscess symptoms. Will call again next week. No future appointments.

## 2021-11-23 ENCOUNTER — CARE COORDINATION (OUTPATIENT)
Dept: CARE COORDINATION | Age: 56
End: 2021-11-23

## 2021-11-23 NOTE — CARE COORDINATION
Writer called Patient to do a Social service follow up call with her. Patient did not answer the phone, a voicemail was left asking her to return my call. Plan: Follow up with Patient at a later date and time.

## 2021-11-26 ENCOUNTER — CARE COORDINATION (OUTPATIENT)
Dept: CARE COORDINATION | Age: 56
End: 2021-11-26

## 2021-11-26 NOTE — CARE COORDINATION
Left VM message asking patient to call me back at [phone] for care management. Will call again in a few weeks. No future appointments.

## 2021-12-10 ENCOUNTER — CARE COORDINATION (OUTPATIENT)
Dept: CARE COORDINATION | Age: 56
End: 2021-12-10

## 2021-12-10 NOTE — CARE COORDINATION
Left VM message with return contact info. This is fourth attempt to contact, she has not returned phone calls. Discharged from care management. Letter will be sent to her regarding this.

## 2021-12-10 NOTE — CARE COORDINATION
Writer made another attempt to reach Patient, Patient did not answer the phone and another voicemail was left asking for a return call.     Plan: Continue to try and reach Patient to gauge her interest in working with our Program.

## 2021-12-10 NOTE — LETTER
December 10, 2021     Sj Cruz,    I have been trying to contact you but haven't been able to reach you at [phone]. I hope everything is going well with you. I will not continue to call you but if you have needs in the future you can call me or call your primary care office at [phone].     I wish you well and good health in the future,          Lucina Claude, [facility]  [phone]

## 2021-12-15 ENCOUNTER — CARE COORDINATION (OUTPATIENT)
Dept: CARE COORDINATION | Age: 56
End: 2021-12-15

## 2021-12-28 ENCOUNTER — CARE COORDINATION (OUTPATIENT)
Dept: CARE COORDINATION | Age: 56
End: 2021-12-28

## 2021-12-28 NOTE — CARE COORDINATION
Writer made attempt to reach Patient again as she has not answered the phone on any of the occassions that this writer called her.  will be signing off on her case today 12/28/2021.

## 2022-09-29 ENCOUNTER — APPOINTMENT (OUTPATIENT)
Dept: CT IMAGING | Age: 57
End: 2022-09-29

## 2022-09-29 ENCOUNTER — HOSPITAL ENCOUNTER (EMERGENCY)
Age: 57
Discharge: HOME | End: 2022-09-29
Attending: EMERGENCY MEDICINE

## 2022-09-29 VITALS
DIASTOLIC BLOOD PRESSURE: 104 MMHG | RESPIRATION RATE: 16 BRPM | SYSTOLIC BLOOD PRESSURE: 191 MMHG | BODY MASS INDEX: 43.24 KG/M2 | HEIGHT: 62 IN | WEIGHT: 235 LBS | HEART RATE: 100 BPM | TEMPERATURE: 98.5 F | OXYGEN SATURATION: 97 %

## 2022-09-29 DIAGNOSIS — L03.311 ABDOMINAL WALL CELLULITIS: Primary | ICD-10-CM

## 2022-09-29 LAB
ABSOLUTE EOS #: 0.3 K/UL (ref 0–0.4)
ABSOLUTE LYMPH #: 1.3 K/UL (ref 1–4.8)
ABSOLUTE MONO #: 0.4 K/UL (ref 0.1–1.2)
ANION GAP SERPL CALCULATED.3IONS-SCNC: 11 MMOL/L (ref 9–17)
BASOPHILS # BLD: 1 % (ref 0–2)
BASOPHILS ABSOLUTE: 0 K/UL (ref 0–0.2)
BUN BLDV-MCNC: 16 MG/DL (ref 6–20)
CALCIUM SERPL-MCNC: 9.2 MG/DL (ref 8.6–10.4)
CHLORIDE BLD-SCNC: 104 MMOL/L (ref 98–107)
CO2: 25 MMOL/L (ref 20–31)
CREAT SERPL-MCNC: 0.99 MG/DL (ref 0.5–0.9)
EOSINOPHILS RELATIVE PERCENT: 4 % (ref 1–4)
GFR AFRICAN AMERICAN: >60 ML/MIN
GFR NON-AFRICAN AMERICAN: 58 ML/MIN
GFR SERPL CREATININE-BSD FRML MDRD: ABNORMAL ML/MIN/{1.73_M2}
GLUCOSE BLD-MCNC: 106 MG/DL (ref 70–99)
HCT VFR BLD CALC: 39.1 % (ref 36–46)
HEMOGLOBIN: 13.2 G/DL (ref 12–16)
LYMPHOCYTES # BLD: 17 % (ref 24–44)
MCH RBC QN AUTO: 29.9 PG (ref 26–34)
MCHC RBC AUTO-ENTMCNC: 33.7 G/DL (ref 31–37)
MCV RBC AUTO: 88.7 FL (ref 80–100)
MONOCYTES # BLD: 6 % (ref 2–11)
PDW BLD-RTO: 15.1 % (ref 12.5–15.4)
PLATELET # BLD: 185 K/UL (ref 140–450)
PMV BLD AUTO: 9.2 FL (ref 6–12)
POTASSIUM SERPL-SCNC: 3.7 MMOL/L (ref 3.7–5.3)
RBC # BLD: 4.4 M/UL (ref 4–5.2)
SEG NEUTROPHILS: 72 % (ref 36–66)
SEGMENTED NEUTROPHILS ABSOLUTE COUNT: 5.7 K/UL (ref 1.8–7.7)
SODIUM BLD-SCNC: 140 MMOL/L (ref 135–144)
WBC # BLD: 7.8 K/UL (ref 3.5–11)

## 2022-09-29 PROCEDURE — 96365 THER/PROPH/DIAG IV INF INIT: CPT

## 2022-09-29 PROCEDURE — 36415 COLL VENOUS BLD VENIPUNCTURE: CPT

## 2022-09-29 PROCEDURE — 2580000003 HC RX 258: Performed by: EMERGENCY MEDICINE

## 2022-09-29 PROCEDURE — 6360000002 HC RX W HCPCS: Performed by: EMERGENCY MEDICINE

## 2022-09-29 PROCEDURE — 99285 EMERGENCY DEPT VISIT HI MDM: CPT

## 2022-09-29 PROCEDURE — 80048 BASIC METABOLIC PNL TOTAL CA: CPT

## 2022-09-29 PROCEDURE — 85025 COMPLETE CBC W/AUTO DIFF WBC: CPT

## 2022-09-29 PROCEDURE — 6360000004 HC RX CONTRAST MEDICATION: Performed by: EMERGENCY MEDICINE

## 2022-09-29 PROCEDURE — 74177 CT ABD & PELVIS W/CONTRAST: CPT

## 2022-09-29 PROCEDURE — 96375 TX/PRO/DX INJ NEW DRUG ADDON: CPT

## 2022-09-29 RX ORDER — KETOROLAC TROMETHAMINE 15 MG/ML
15 INJECTION, SOLUTION INTRAMUSCULAR; INTRAVENOUS ONCE
Status: COMPLETED | OUTPATIENT
Start: 2022-09-29 | End: 2022-09-29

## 2022-09-29 RX ORDER — SODIUM CHLORIDE 0.9 % (FLUSH) 0.9 %
10 SYRINGE (ML) INJECTION PRN
Status: DISCONTINUED | OUTPATIENT
Start: 2022-09-29 | End: 2022-09-29 | Stop reason: HOSPADM

## 2022-09-29 RX ORDER — ONDANSETRON 2 MG/ML
4 INJECTION INTRAMUSCULAR; INTRAVENOUS ONCE
Status: COMPLETED | OUTPATIENT
Start: 2022-09-29 | End: 2022-09-29

## 2022-09-29 RX ORDER — LEVOFLOXACIN 750 MG/1
750 TABLET ORAL DAILY
Qty: 4 TABLET | Refills: 0 | Status: SHIPPED | OUTPATIENT
Start: 2022-09-29 | End: 2022-10-03

## 2022-09-29 RX ORDER — 0.9 % SODIUM CHLORIDE 0.9 %
1000 INTRAVENOUS SOLUTION INTRAVENOUS ONCE
Status: COMPLETED | OUTPATIENT
Start: 2022-09-29 | End: 2022-09-29

## 2022-09-29 RX ORDER — 0.9 % SODIUM CHLORIDE 0.9 %
80 INTRAVENOUS SOLUTION INTRAVENOUS ONCE
Status: DISCONTINUED | OUTPATIENT
Start: 2022-09-29 | End: 2022-09-29 | Stop reason: HOSPADM

## 2022-09-29 RX ORDER — LEVOFLOXACIN 5 MG/ML
750 INJECTION, SOLUTION INTRAVENOUS ONCE
Status: COMPLETED | OUTPATIENT
Start: 2022-09-29 | End: 2022-09-29

## 2022-09-29 RX ADMIN — IOPAMIDOL 75 ML: 755 INJECTION, SOLUTION INTRAVENOUS at 11:19

## 2022-09-29 RX ADMIN — LEVOFLOXACIN 750 MG: 5 INJECTION, SOLUTION INTRAVENOUS at 10:39

## 2022-09-29 RX ADMIN — SODIUM CHLORIDE 1000 ML: 9 INJECTION, SOLUTION INTRAVENOUS at 10:35

## 2022-09-29 RX ADMIN — KETOROLAC TROMETHAMINE 15 MG: 15 INJECTION, SOLUTION INTRAMUSCULAR; INTRAVENOUS at 10:34

## 2022-09-29 RX ADMIN — ONDANSETRON 4 MG: 2 INJECTION INTRAMUSCULAR; INTRAVENOUS at 10:35

## 2022-09-29 RX ADMIN — SODIUM CHLORIDE, PRESERVATIVE FREE 10 ML: 5 INJECTION INTRAVENOUS at 11:19

## 2022-09-29 RX ADMIN — Medication 80 ML: at 11:19

## 2022-09-29 ASSESSMENT — ENCOUNTER SYMPTOMS
NAUSEA: 1
ABDOMINAL PAIN: 1

## 2022-09-29 ASSESSMENT — PAIN - FUNCTIONAL ASSESSMENT: PAIN_FUNCTIONAL_ASSESSMENT: 0-10

## 2022-09-29 ASSESSMENT — PAIN SCALES - GENERAL: PAINLEVEL_OUTOF10: 7

## 2022-09-29 NOTE — DISCHARGE INSTRUCTIONS
May apply warm Epson salt water solution to the affected area you are to return to the ER for increasing pain swelling redness any development of fever or other concerns otherwise you are to follow-up with your family doctor or return to the emergency department tomorrow for a repeat evaluation  Please understand that at this time there is no evidence for a more serious underlying process, but that early in the process of an illness or injury, an emergency department workup can be falsely reassuring. You should contact your family doctor within the next 48 hours for a follow up appointment    Harmony Redd!!!    From [address] and [facility] Emergency Services    On behalf of the Emergency Department staff at [address], I would like to thank you for giving us the opportunity to address your health care needs and concerns. We hope that during your visit, our service was delivered in a professional and caring manner. Please keep [address] in mind as we walk with you down the path to your own personal wellness. Please expect an automated text message or email from us so we can ask a few questions about your health and progress. Based on your answers, a clinician may call you back to offer help and instructions. Please understand that early in the process of an illness or injury, an emergency department workup can be falsely reassuring. If you notice any worsening, changing or persistent symptoms please call your family doctor or return to the ER immediately. Tell us how we did during your visit at [email or web address]   and let us know about your experience

## 2022-09-29 NOTE — Clinical Note
Rachele Farr was seen and treated in our emergency department on 9/29/2022. She may return to work on 10/01/2022. If you have any questions or concerns, please don't hesitate to call.       Jacqueline Min MD

## 2022-09-29 NOTE — ED PROVIDER NOTES
[address] [facility] ED  [address] [facility] [address] [facility] [address]  Phone: [phone]  Fax: [phone]        Pt Name: Abhilash Benz  MRN: [id number]  [place] 1965  Date of evaluation: 9/29/22      CHIEF COMPLAINT     Chief Complaint   Patient presents with    Abscess     Right side abdomen         HISTORY OF PRESENT ILLNESS  (Location/Symptom, Timing/Onset, Context/Setting, Quality, Duration, Modifying Factors, Severity.)    Abhilash Benz is a 56 y.o. female who presents with an infection of the abdominal wall. The patient dates she has a history of abscess that has required I&D in the past starting a week ago on the right side of her abdomen she developed a small wound that has since grown in size she now has surrounding erythema around this she has discomfort to this area denies any fever does have some occasional chills and some occasional nausea no other abdominal pain no vomiting no diarrhea no chest pain or shortness of breath palpation the area makes her symptoms worse nothing makes it better      REVIEW OF SYSTEMS    (2-9 systems for level 4, 10 or more for level 5)     Review of Systems   Constitutional:  Positive for chills. Gastrointestinal:  Positive for abdominal pain and nausea. Skin:  Positive for wound. All other systems reviewed and are negative. PAST MEDICAL HISTORY    has a past medical history of Anxiety, Chronic back pain, Depression, Hypertension, Mass of adrenal gland ([address] Obesity, Solitary left kidney, Transient gluten sensitivity, and WPW (Emiliana-Parkinson-White syndrome). SURGICAL HISTORY      has a past surgical history that includes Tonsillectomy; Appendectomy; Arm Surgery; ablation of dysrhythmic focus; Upper gastrointestinal endoscopy (N/A, 2/21/2019); and Colonoscopy (N/A, 2/21/2019).     CURRENTMEDICATIONS       Previous Medications    ALBUTEROL SULFATE  (90 BASE) MCG/ACT INHALER    Inhale 2 puffs into the lungs every 6 hours as needed for Wheezing or Shortness of Breath    BUPROPION (WELLBUTRIN SR) 200 MG EXTENDED RELEASE TABLET    Take 1 tablet by mouth 2 times daily    CLONIDINE (CATAPRES) 0.1 MG TABLET    Take 1 tablet by mouth 2 times daily    HYDROCODONE-ACETAMINOPHEN (NORCO) 5-325 MG PER TABLET    Take 1 tablet by mouth every 6 hours as needed for Pain. LIDOCAINE (LIDODERM) 5 %    Place 1 patch onto the skin daily 12 hours on, 12 hours off. LOSARTAN (COZAAR) 50 MG TABLET    Take 1 tablet by mouth daily    METHOCARBAMOL (ROBAXIN-750) 750 MG TABLET    Take 1 tablet by mouth 3 times daily as needed (back spasm)    METOPROLOL TARTRATE (LOPRESSOR) 50 MG TABLET    Take 1 tablet by mouth 2 times daily       ALLERGIES     is allergic to gluten meal and pcn [penicillins]. FAMILY HISTORY     She indicated that her mother is . She indicated that the status of her father is unknown. She indicated that her sister is alive. family history includes Cancer in her mother; Diabetes in her father and sister; Ovarian Cancer in her sister; Stroke in her mother. SOCIAL HISTORY      reports that she has been smoking cigarettes. She has a 5.00 pack-year smoking history. She has never used smokeless tobacco. She reports current drug use. Frequency: 7.00 times per week. Drug: Marijuana [employer]). She reports that she does not drink alcohol. PHYSICAL EXAM    (up to 7 for level 4, 8 or more for level 5)   INITIAL VITALS:  height is 5' 2\" (1.575 m) and weight is 106.6 kg (235 lb). Her oral temperature is 98.5 °F (36.9 °C). Her blood pressure is 191/104 (abnormal) and her pulse is 100. Her respiration is 16 and oxygen saturation is 97%. Physical Exam  Vitals and nursing note reviewed. Constitutional:       Comments: Mild to moderate distress secondary to HPI   HENT:      Head: Normocephalic and atraumatic. Eyes:      Conjunctiva/sclera: Conjunctivae normal.   Cardiovascular:      Rate and Rhythm: Normal rate and regular rhythm. Pulmonary:      Effort: Pulmonary effort is normal.      Breath sounds: Normal breath sounds. Abdominal:      General: Bowel sounds are normal. There is no distension. Palpations: Abdomen is soft. There is no mass. Tenderness: There is abdominal tenderness. There is guarding. There is no right CVA tenderness, left CVA tenderness or rebound. Comments: Tenderness with some guarding to the right side of the abdominal wall the patient is noted to have what almost appears to be a little necrotic area with surrounding cellulitis   Musculoskeletal:         General: Normal range of motion. Cervical back: Normal range of motion and neck supple. Skin:     Comments: Abdominal wall cellulitis with what appears to be some necrotic area in the middle of the cellulitis as it has some blackened tissue   Neurological:      General: No focal deficit present. Mental Status: She is alert. DIFFERENTIAL DIAGNOSIS/ MDM:     We will establish an IV give the patient antibiotics Zofran check labs and a CT of the abdomen and pelvis        Skin / Soft Tissue  Cellulitis / Erysipelas = Ceftriaxone 1 gm IV plus Vancomycin 15 mg/kg IV, if Penicillin allergy then Levofloxacin 500 gm IV  Necrotizing infection / Severe Cellulitis = Zosyn 3.375 gm IV plus Vancomycin 15 mg/kg IV, if Penicillin allergy then Clindamycin 900 mg IV plus Vancomycin 15 mg/kg IV plus Ciprofloxacin 500 mg IV      DIAGNOSTIC RESULTS         RADIOLOGY:        Interpretation per the Radiologist below, if available at the time of this note:    CT ABDOMEN PELVIS W IV CONTRAST Additional Contrast? None    Result Date: 9/29/2022  EXAMINATION: CT OF THE ABDOMEN AND PELVIS WITH CONTRAST 9/29/2022 11:18 am TECHNIQUE: CT of the abdomen and pelvis was performed with the administration of intravenous contrast. Multiplanar reformatted images are provided for review.  Automated exposure control, iterative reconstruction, and/or weight based adjustment of the mA/kV was utilized to reduce the radiation dose to as low as reasonably achievable. COMPARISON: March 17, 2021 HISTORY: ORDERING SYSTEM PROVIDED HISTORY: right sided abdominal cellulitis / pain TECHNOLOGIST PROVIDED HISTORY: right sided abdominal cellulitis / pain Decision Support Exception - unselect if not a suspected or confirmed emergency medical condition->Emergency Medical Condition (MA) Reason for Exam: right side cellulitis/abdominal pain FINDINGS: Lower Chest: No significant abnormality at the lung bases. Organs: Hepatic steatosis is present. No focal liver lesion. The gallbladder is decompressed. No biliary ductal dilatation. The spleen is normal in size without focal lesion. The pancreas is unremarkable. Again demonstrated is the 3 cm left adrenal gland adenoma. The right adrenal gland is unremarkable. Again demonstrated is the severely atrophic right kidney replaced with 3 cysts. GI/Bowel: No bowel obstruction. Colonic diverticulosis without evidence for diverticulitis. Pelvis: Focus of air in an otherwise unremarkable bladder is probably related to recent instrumentation/catheterization. The uterus and adnexa are within normal limits. Peritoneum/Retroperitoneum: No abdominal lymphadenopathy or ascites. Bones/Soft Tissues: CT confirms that there is marked skin thickening involving the lower lateral abdominal wall consistent with history of cellulitis. There is no focal fluid collection or abscess formation in the underlying subcutaneous fat. No acute osseous abnormality. CT confirms right lower lateral abdominal wall cellulitis. No focal fluid collection or abscess formation in the underlying subcutaneous fat. No acute process in the abdomen or pelvis. Hepatic steatosis.        LABS:  Results for orders placed or performed during the hospital encounter of 31/94/04   Basic Metabolic Panel   Result Value Ref Range    Glucose 106 (H) 70 - 99 mg/dL    BUN 16 6 - 20 mg/dL    Creatinine 0.99 (H) 0.50 - 0.90 mg/dL    Calcium 9.2 8.6 - 10.4 mg/dL    Sodium 140 135 - 144 mmol/L    Potassium 3.7 3.7 - 5.3 mmol/L    Chloride 104 98 - 107 mmol/L    CO2 25 20 - 31 mmol/L    Anion Gap 11 9 - 17 mmol/L    GFR Non-African American 58 (L) >60 mL/min    GFR African American >60 >60 mL/min    GFR Comment         CBC with Auto Differential   Result Value Ref Range    WBC 7.8 3.5 - 11.0 k/uL    RBC 4.40 4.0 - 5.2 m/uL    Hemoglobin 13.2 12.0 - 16.0 g/dL    Hematocrit 39.1 36 - 46 %    MCV 88.7 80 - 100 fL    MCH 29.9 26 - 34 pg    MCHC 33.7 31 - 37 g/dL    RDW 15.1 12.5 - 15.4 %    Platelets 603 021 - 464 k/uL    MPV 9.2 6.0 - 12.0 fL    Seg Neutrophils 72 (H) 36 - 66 %    Lymphocytes 17 (L) 24 - 44 %    Monocytes 6 2 - 11 %    Eosinophils % 4 1 - 4 %    Basophils 1 0 - 2 %    Segs Absolute 5.70 1.8 - 7.7 k/uL    Absolute Lymph # 1.30 1.0 - 4.8 k/uL    Absolute Mono # 0.40 0.1 - 1.2 k/uL    Absolute Eos # 0.30 0.0 - 0.4 k/uL    Basophils Absolute 0.00 0.0 - 0.2 k/uL           EMERGENCY DEPARTMENT COURSE:   Vitals:    Vitals:    09/29/22 1011   BP: (!) 191/104   Pulse: 100   Resp: 16   Temp: 98.5 °F (36.9 °C)   TempSrc: Oral   SpO2: 97%   Weight: 106.6 kg (235 lb)   Height: 5' 2\" (1.575 m)     -------------------------  BP: (!) 191/104, Temp: 98.5 °F (36.9 °C), Heart Rate: 100, Resp: 16      RE-EVALUATION:  Patient presents with abdominal wall cellulitis I did offer her admission to hospital setting I reviewed the labs and CT report with the patient the patient is requesting to work this up as an outpatient I will go ahead and write a prescription for Levaquin we will marked the area of cellulitis and recommending that she return to the ER immediately for increasing swelling redness any development of fever increasing pain otherwise she is to return to the emergency department or see her family doctor tomorrow for a repeat evaluation      PROCEDURES:  None    FINAL IMPRESSION      1.  Abdominal wall cellulitis DISPOSITION/PLAN   DISPOSITION Decision To Discharge 09/29/2022 12:14:11 PM      CONDITION ON DISPOSITION:   Stable    PATIENT REFERRED TO:  DK Kennedy - CNP  [address] [facility]   [address] [address]  [address] [facility]   [phone]    Call in 1 day      DISCHARGE MEDICATIONS:  New Prescriptions    LEVOFLOXACIN (LEVAQUIN) 750 MG TABLET    Take 1 tablet by mouth daily for 4 days       (Please note that portions of this note were completed with a voicerecognition program.  Efforts were made to edit the dictations but occasionally words are mis-transcribed.)    Luciano Li MD,, MD, F.A.C.E.P.   Attending Emergency Medicine Physician       Luciano Li MD  09/29/22 1017

## 2022-09-29 NOTE — Clinical Note
Richardson Abarca was seen and treated in our emergency department on 9/29/2022. She may return to work on 10/01/2022. If you have any questions or concerns, please don't hesitate to call.       Kelle Tijerina MD

## 2022-12-14 NOTE — ED PROVIDER NOTES
[id number] [facility] ED  [address] [facility] [address] [address] [facility] [address]  Phone: [phone]  Fax: [phone]        Pt Name: Festus Truong  MRN: [id number]  [place] 1965  Date of evaluation: 3/2/21      CHIEF COMPLAINT     Chief Complaint   Patient presents with    Headache         HISTORY OF PRESENT ILLNESS  (Location/Symptom, Timing/Onset, Context/Setting, Quality, Duration, Modifying Factors, Severity.)    Festus Truong is a 50 y.o. female who presents headache intermittent over the past week. She was seen here in the emergency department earlier today with the same complaint. At that time she was found to be hypertensive. Patient was given labetalol 10 mg IV push. She also was given Toradol and Decadron IV. EKG, CBC, BMP, and troponin were all normal.  Patient had a head CT which showed no acute process. She reports that a number of years ago she had a problem with hypertension. She was on a medication but she does not remember the name of it. She started having problems with her blood pressure dropping low so her primary physician took her off the medication. For the past 6 months she states her blood pressure has been very good and she has not needed any medications at all. This headache that she has is a pressure feeling in the frontal and top of her head. It is associated with some photophobia and some nausea. She denies having any numbness or weakness of her arms or legs. She denies having any facial droop or numbness of her face. He denies any changes in her vision or changes in her speech. She denies any problems with loss of balance. She denies any fever chills cough or congestion. She denies any chest pain or shortness of breath. With the Toradol and the labetalol patient's blood pressure did come down and her headache went away. She was discharged home to follow-up with her primary care physician.   Patient states she was doing well when she left here but now the headache has come back. It is a same type of headache pressure in the frontal and top of her head. Her blood pressure here on arrival was 228/125. She has some mild nausea. REVIEW OF SYSTEMS    (2-9 systems for level 4, 10 or more for level 5)     Review of Systems   Constitutional: Negative for chills, fatigue and fever. HENT: Negative for congestion, sinus pressure, sinus pain and sore throat. Eyes: Positive for photophobia. Negative for visual disturbance. Respiratory: Negative for cough, chest tightness and shortness of breath. Cardiovascular: Negative for chest pain, palpitations and leg swelling. Gastrointestinal: Positive for nausea. Negative for abdominal pain, constipation, diarrhea and vomiting. Genitourinary: Negative for difficulty urinating and dysuria. Musculoskeletal: Negative for back pain, gait problem, joint swelling and neck pain. Skin: Negative for rash and wound. Neurological: Positive for dizziness, light-headedness and headaches. Negative for tremors, seizures, syncope, facial asymmetry, speech difficulty, weakness and numbness. PAST MEDICAL HISTORY    has a past medical history of Anxiety, Chronic back pain, Hypertension, Mass of adrenal gland ([address] Obesity, Solitary left kidney, Transient gluten sensitivity, and WPW (Emiliana-Parkinson-White syndrome). SURGICAL HISTORY      has a past surgical history that includes Tonsillectomy; Appendectomy; Arm Surgery; ablation of dysrhythmic focus; Upper gastrointestinal endoscopy (N/A, 2019); and Colonoscopy (N/A, 2019). CURRENTMEDICATIONS       Previous Medications    BUPROPION (WELLBUTRIN SR) 200 MG EXTENDED RELEASE TABLET    Take 200 mg by mouth 2 times daily       ALLERGIES     is allergic to gluten meal and pcn [penicillins]. FAMILY HISTORY     She indicated that her mother is . She indicated that the status of her father is unknown. She indicated that her sister is alive.      family 21.9 history includes Cancer in her mother; Diabetes in her father and sister; Ovarian Cancer in her sister; Stroke in her mother. SOCIAL HISTORY      reports that she has been smoking cigarettes. She has been smoking about 0.10 packs per day. She has never used smokeless tobacco. She reports current drug use. Drug: Marijuana. She reports that she does not drink alcohol. PHYSICAL EXAM    (up to 7 for level 4, 8 or more for level 5)   INITIAL VITALS:  height is 5' 2\" (1.575 m) and weight is 106.6 kg (235 lb). Her oral temperature is 99.3 °F (37.4 °C). Her blood pressure is 148/85 (abnormal) and her pulse is 73. Her respiration is 22 and oxygen saturation is 96%. Physical Exam  Vitals signs and nursing note reviewed. Constitutional:       General: She is in acute distress. HENT:      Head: Normocephalic and atraumatic. Mouth/Throat:      Mouth: Mucous membranes are dry. Eyes:      Extraocular Movements: Extraocular movements intact. Conjunctiva/sclera: Conjunctivae normal.      Pupils: Pupils are equal, round, and reactive to light. Neck:      Musculoskeletal: Normal range of motion and neck supple. No neck rigidity or muscular tenderness. Vascular: No carotid bruit. Cardiovascular:      Rate and Rhythm: Normal rate and regular rhythm. Pulses: Normal pulses. Heart sounds: Normal heart sounds. No murmur. No gallop. Pulmonary:      Effort: Pulmonary effort is normal. No respiratory distress. Breath sounds: Normal breath sounds. No stridor. No wheezing, rhonchi or rales. Chest:      Chest wall: No tenderness. Abdominal:      General: Abdomen is flat. There is no distension. Tenderness: There is no abdominal tenderness. There is no right CVA tenderness, left CVA tenderness, guarding or rebound. Musculoskeletal: Normal range of motion. General: No swelling. Right lower leg: No edema. Left lower leg: No edema.    Lymphadenopathy:      Cervical: No cervical adenopathy. Skin:     General: Skin is warm and dry. Capillary Refill: Capillary refill takes less than 2 seconds. Neurological:      General: No focal deficit present. Mental Status: She is alert and oriented to person, place, and time. Cranial Nerves: No cranial nerve deficit. Sensory: No sensory deficit. Motor: No weakness. Coordination: Coordination normal.      Gait: Gait normal.      Deep Tendon Reflexes: Reflexes normal.      Comments: NIH score 0   Psychiatric:         Mood and Affect: Mood normal.         DIFFERENTIAL DIAGNOSIS/ MDM:     The patient presents with headache without signs of CNS bleed, stroke, infection, temporal arteritis, idiopathic intracranial hypertension, or other serious etiology. The patient is neurologically intact. Given the extremely low risk of these diagnoses further testing and evaluation for these possibilities does not appear to be indicated at this time. The patient appears stable for discharge and has been instructed to return immediately if the symptoms worsen in any way, or in 8-12 hr if not improved for re-evaluation. We also discussed returning to the Emergency Department immediately if new or worsening symptoms occur. We have discussed the symptoms which are most concerning (e.g., changing or worsening pain, visual or hearing changes, numbness or weakness, fever, stiff neck, or rash) that necessitate immediate return. The patient understands that at this time there is no evidence for a more malignant underlying process, but the patient also understands that early in the process of an illness or injury, an emergency department workup can be falsely reassuring. Routine discharge counseling was given, and the patient understands that worsening, changing or persistent symptoms should prompt an immediate call or follow up with their primary physician or return to the emergency department.  The importance of appropriate follow up was also discussed. I have reviewed the disposition diagnosis with the patient and or their family/guardian. I have answered their questions and given discharge instructions. They voiced understanding of these instructions and did not have any further questions or complaints. Patient's headache went away after she was given a dose of Lopressor 10 mg which brought her blood pressure down to as low as 126/85. She was given an oral dose of metoprolol 25 mg. She will be discharged home with a prescription for metoprolol 25 mg twice daily and outpatient follow-up with your PCP in 1 to 2 days. She will monitor her blood pressure at home and return if her blood pressure goes back up with a systolic greater than 197 or diastolic greater than 051. She will also return if her headache comes back. DIAGNOSTIC RESULTS     EKG: All EKG's are interpreted by the Emergency Department Physician who either signs or Co-signs this chart in the [address] a cardiologist.        RADIOLOGY:  Non-plain film images such as CT, Ultrasound and MRI are read by the radiologist. Plain radiographic images are visualized and the radiologist interpretations are reviewed as follows:         Interpretation per the Radiologist below, if available at the time of this note:        LABS:  No results found for this visit on 03/02/21. EMERGENCY DEPARTMENT COURSE:   Vitals:    Vitals:    03/02/21 2045 03/02/21 2112 03/02/21 2134 03/02/21 2219   BP: (!) 185/92 (!) 147/79 126/85 (!) 148/85   Pulse: 91 74 72 73   Resp: 21 17 15 22   Temp:       TempSrc:       SpO2: 96% 96% 95% 96%   Weight:       Height:         -------------------------  BP: (!) 148/85, Temp: 99.3 °F (37.4 °C), Pulse: 73, Resp: 22      RE-EVALUATION:  9:31 PM EST patient's blood pressure has come down to 147/79. Her headache has improved she rates it now about a 5. I did offer additional pain medication but she declined at this time.   Her headaches are most likely related to her hypertension. I did review her medical records on 4/11/2019 she was on Lopressor twice daily. CONSULTS:      PROCEDURES:  None    FINAL IMPRESSION      1. Essential hypertension          DISPOSITION/PLAN   DISPOSITION Decision To Discharge 03/02/2021 10:24:35 PM      CONDITION ON DISPOSITION:   Improved    PATIENT REFERRED TO:  Camacho Terrell, DK - JOSE ALFREDO  [address] [facility]  [address] [address]  [phone]    Call in 2 days  recheck BP      DISCHARGE MEDICATIONS:  New Prescriptions    METOPROLOL TARTRATE (LOPRESSOR) 25 MG TABLET    Take 1 tablet by mouth 2 times daily       (Please note that portions of this note were completed with a voicerecognition program.  Efforts were made to edit the dictations but occasionally words are mis-transcribed. )    Cantor MD, F.A.C.E.P.   Attending Emergency Medicine Physician        Augie Morrow,   03/02/21 4075

## 2023-01-16 ENCOUNTER — APPOINTMENT (OUTPATIENT)
Dept: CT IMAGING | Age: 58
End: 2023-01-16

## 2023-01-16 ENCOUNTER — HOSPITAL ENCOUNTER (EMERGENCY)
Age: 58
Discharge: HOME | End: 2023-01-16
Attending: EMERGENCY MEDICINE

## 2023-01-16 VITALS
SYSTOLIC BLOOD PRESSURE: 166 MMHG | HEART RATE: 81 BPM | WEIGHT: 235 LBS | OXYGEN SATURATION: 93 % | RESPIRATION RATE: 15 BRPM | HEIGHT: 62 IN | TEMPERATURE: 99.1 F | BODY MASS INDEX: 43.24 KG/M2 | DIASTOLIC BLOOD PRESSURE: 95 MMHG

## 2023-01-16 DIAGNOSIS — D35.02 ADENOMA OF LEFT ADRENAL GLAND: ICD-10-CM

## 2023-01-16 DIAGNOSIS — M62.830 LUMBAR PARASPINAL MUSCLE SPASM: Primary | ICD-10-CM

## 2023-01-16 LAB
ABSOLUTE EOS #: 0.58 K/UL (ref 0–0.4)
ABSOLUTE LYMPH #: 2.3 K/UL (ref 1–4.8)
ABSOLUTE MONO #: 0.56 K/UL (ref 0.1–1.2)
ALBUMIN SERPL-MCNC: 3.9 G/DL (ref 3.5–5.2)
ALBUMIN/GLOBULIN RATIO: 1.2 (ref 1–2.5)
ALP BLD-CCNC: 92 U/L (ref 35–104)
ALT SERPL-CCNC: <5 U/L (ref 5–33)
ANION GAP SERPL CALCULATED.3IONS-SCNC: 12 MMOL/L (ref 9–17)
AST SERPL-CCNC: 25 U/L
BACTERIA: ABNORMAL
BASOPHILS # BLD: 0 % (ref 0–2)
BASOPHILS ABSOLUTE: 0.02 K/UL (ref 0–0.2)
BILIRUB SERPL-MCNC: 0.3 MG/DL (ref 0.3–1.2)
BILIRUBIN DIRECT: 0.1 MG/DL
BILIRUBIN URINE: NEGATIVE
BILIRUBIN, INDIRECT: 0.2 MG/DL (ref 0–1)
BUN BLDV-MCNC: 18 MG/DL (ref 6–20)
CALCIUM SERPL-MCNC: 9.1 MG/DL (ref 8.6–10.4)
CHLORIDE BLD-SCNC: 104 MMOL/L (ref 98–107)
CO2: 25 MMOL/L (ref 20–31)
COLOR: YELLOW
COMMENT UA: ABNORMAL
CREAT SERPL-MCNC: 0.85 MG/DL (ref 0.5–0.9)
EOSINOPHILS RELATIVE PERCENT: 8 % (ref 1–4)
EPITHELIAL CELLS UA: ABNORMAL /HPF (ref 0–5)
GFR SERPL CREATININE-BSD FRML MDRD: >60 ML/MIN/1.73M2
GLUCOSE BLD-MCNC: 149 MG/DL (ref 70–99)
GLUCOSE URINE: NEGATIVE
HCT VFR BLD CALC: 43.9 % (ref 36–46)
HEMOGLOBIN: 14.3 G/DL (ref 12–16)
KETONES, URINE: NEGATIVE
LEUKOCYTE ESTERASE, URINE: NEGATIVE
LIPASE: 58 U/L (ref 13–60)
LYMPHOCYTES # BLD: 30 % (ref 24–44)
MCH RBC QN AUTO: 29.3 PG (ref 26–34)
MCHC RBC AUTO-ENTMCNC: 32.6 G/DL (ref 31–37)
MCV RBC AUTO: 90 FL (ref 80–100)
MONOCYTES # BLD: 7 % (ref 2–11)
NITRITE, URINE: NEGATIVE
OTHER OBSERVATIONS UA: ABNORMAL
PDW BLD-RTO: 14.8 % (ref 12.5–15.4)
PH UA: 7 (ref 5–8)
PLATELET # BLD: 203 K/UL (ref 140–450)
PMV BLD AUTO: 11.2 FL (ref 8–14)
POTASSIUM SERPL-SCNC: 3.6 MMOL/L (ref 3.7–5.3)
PROTEIN UA: ABNORMAL
RBC # BLD: 4.88 M/UL (ref 4–5.2)
RBC UA: ABNORMAL /HPF (ref 0–2)
SEG NEUTROPHILS: 55 % (ref 36–66)
SEGMENTED NEUTROPHILS ABSOLUTE COUNT: 4.15 K/UL (ref 1.8–7.7)
SODIUM BLD-SCNC: 141 MMOL/L (ref 135–144)
SPECIFIC GRAVITY UA: 1.01 (ref 1–1.03)
TOTAL PROTEIN: 7.2 G/DL (ref 6.4–8.3)
TURBIDITY: CLEAR
URINE HGB: ABNORMAL
UROBILINOGEN, URINE: NORMAL
WBC # BLD: 7.6 K/UL (ref 3.5–11)
WBC UA: ABNORMAL /HPF (ref 0–5)

## 2023-01-16 PROCEDURE — 81001 URINALYSIS AUTO W/SCOPE: CPT

## 2023-01-16 PROCEDURE — 80048 BASIC METABOLIC PNL TOTAL CA: CPT

## 2023-01-16 PROCEDURE — 96375 TX/PRO/DX INJ NEW DRUG ADDON: CPT

## 2023-01-16 PROCEDURE — 93005 ELECTROCARDIOGRAM TRACING: CPT | Performed by: PHYSICIAN ASSISTANT

## 2023-01-16 PROCEDURE — 6360000002 HC RX W HCPCS: Performed by: PHYSICIAN ASSISTANT

## 2023-01-16 PROCEDURE — 96374 THER/PROPH/DIAG INJ IV PUSH: CPT

## 2023-01-16 PROCEDURE — 85025 COMPLETE CBC W/AUTO DIFF WBC: CPT

## 2023-01-16 PROCEDURE — 36415 COLL VENOUS BLD VENIPUNCTURE: CPT

## 2023-01-16 PROCEDURE — 74176 CT ABD & PELVIS W/O CONTRAST: CPT

## 2023-01-16 PROCEDURE — 80076 HEPATIC FUNCTION PANEL: CPT

## 2023-01-16 PROCEDURE — 99284 EMERGENCY DEPT VISIT MOD MDM: CPT

## 2023-01-16 PROCEDURE — 83690 ASSAY OF LIPASE: CPT

## 2023-01-16 RX ORDER — MORPHINE SULFATE 4 MG/ML
4 INJECTION, SOLUTION INTRAMUSCULAR; INTRAVENOUS ONCE
Status: COMPLETED | OUTPATIENT
Start: 2023-01-16 | End: 2023-01-16

## 2023-01-16 RX ORDER — LIDOCAINE 4 G/G
1 PATCH TOPICAL DAILY
Qty: 30 PATCH | Refills: 0 | Status: SHIPPED | OUTPATIENT
Start: 2023-01-16 | End: 2023-02-15

## 2023-01-16 RX ORDER — CYCLOBENZAPRINE HCL 10 MG
10 TABLET ORAL 3 TIMES DAILY PRN
Qty: 21 TABLET | Refills: 0 | Status: SHIPPED | OUTPATIENT
Start: 2023-01-16 | End: 2023-01-26

## 2023-01-16 RX ORDER — ORPHENADRINE CITRATE 30 MG/ML
60 INJECTION INTRAMUSCULAR; INTRAVENOUS ONCE
Status: COMPLETED | OUTPATIENT
Start: 2023-01-16 | End: 2023-01-16

## 2023-01-16 RX ADMIN — ORPHENADRINE CITRATE 60 MG: 30 INJECTION INTRAMUSCULAR; INTRAVENOUS at 21:25

## 2023-01-16 RX ADMIN — MORPHINE SULFATE 4 MG: 4 INJECTION INTRAVENOUS at 21:27

## 2023-01-16 ASSESSMENT — PAIN DESCRIPTION - LOCATION: LOCATION: BACK

## 2023-01-16 ASSESSMENT — PAIN DESCRIPTION - FREQUENCY: FREQUENCY: CONTINUOUS

## 2023-01-16 ASSESSMENT — PAIN DESCRIPTION - DIRECTION: RADIATING_TOWARDS: RIGHT FLANK

## 2023-01-16 ASSESSMENT — PAIN DESCRIPTION - PAIN TYPE: TYPE: ACUTE PAIN

## 2023-01-16 ASSESSMENT — PAIN DESCRIPTION - DESCRIPTORS: DESCRIPTORS: SHARP;STABBING

## 2023-01-16 ASSESSMENT — PAIN DESCRIPTION - ORIENTATION: ORIENTATION: RIGHT;LOWER

## 2023-01-16 ASSESSMENT — PAIN SCALES - GENERAL
PAINLEVEL_OUTOF10: 6
PAINLEVEL_OUTOF10: 10

## 2023-01-16 NOTE — Clinical Note
Sanjay Cardoza was seen and treated in our emergency department on 1/16/2023. She may return to work on 01/18/2023. If you have any questions or concerns, please don't hesitate to call.       Donovan Landon, DO

## 2023-01-17 NOTE — DISCHARGE INSTRUCTIONS
Call today or tomorrow to follow up with [facility], APRN - CNP  in 1-2 days. Use an ice pack or bag filled with ice and apply to the injured area 3 - 4 times a day for 15 - 20 minutes each time. If the injury is older than 3 days, then use a heating pad to help relax the muscles in your back. Do not take ibuprofen or NSAID's if you were told not to. Otherwise use ibuprofen or Tylenol (unless prescribed medications that have Tylenol in it) for pain. You can take over the counter Ibuprofen (advil) tablets (4 tablets every 8 hours or 3 tablets every 6 hours or 2 tablets every 4 hours)    Ronald' Flexion Exercises     1. Pelvic tilt. Lie on your back with knees bent, feet flat on floor. Flatten the small of your back against the floor, without pushing down with the legs. Hold for 5 to 10 seconds. 2. Single Knee to chest. Lie on your back with knees bent and feet flat on the floor. Slowly pull your right knee toward your shoulder and hold 5 to 10 seconds. Lower the knee and repeat with the other knee. 3. Double knee to chest. Begin as in the previous exercise. After pulling right knee to chest, pull left knee to chest and hold both knees for 5 to 10 seconds. Slowly lower one leg at a time. 4. Partial sit-up. Do the pelvic tilt (exercise 1) and, while holding this position, slowly curl your head and shoulders off the floor. Hold briefly. Return slowly to the starting position. 5. Hamstring stretch. Start in long sitting with toes directed toward the ceiling and knees fully extended. Slowly lower the trunk forward over the legs, keeping knees extended, arms outstretched over the legs, and eyes focus ahead. 6. Hip Flexor stretch. Place one foot in front of the other with the left (front) knee flexed and the right (back) knee held rigidly straight. Flex forward through the trunk until the left knee contacts the axillary fold (arm pit region).  Repeat with right leg forward and left leg back.     7. Squat. Stand with both feet parallel, about shoulder's width apart. Attempting to maintain the trunk as perpendicular as possible to the floor, eyes focused ahead, and feet flat on the floor, the subject slowly lowers his body by flexing his knees    Return to the Emergency Department for inability to move legs, worsening of pain, tingling / loss of sensation, any other care or concern.

## 2023-01-17 NOTE — ED PROVIDER NOTES
[address] Emergency Department  [address] [facility] [address] [address]  Phone: [phone]  Fax: [phone]        Pt Name: Luis Enrique Mckeon  MRN: [id number]  [place] 1965  Date of evaluation: 1/16/23    [address]       Chief Complaint   Patient presents with    Back Pain     Low right radiating to flank. Began 0200. Denies injury       HISTORY OF PRESENT ILLNESS (Location/Symptom, Timing/Onset, Context/Setting, Quality, Duration, Modifying Factors, Severity)      Luis Enrique Mckeon is a 56 y.o. female with pertinent PMH of WPW, hypertension, chronic back pain, congenital atrophic right kidney who presents to the ED via private auto with flank pain. Patient reports that this morning around 2 AM she was awoken with pain to her right flank. She reports that it lasted couple hours and resolved. Denies any known trauma or injury and denies any recent heavy lifting. She was able to go to work today and does not do any physical lifting or labor and felt okay. She states that when she got home \"all hell broke loose. \"  She has been experiencing severe pain to her right flank since then. She notes that she does not have a kidney on the side but is concerned about this. She does report some urinary frequency. She has exacerbation of the pain with any movement or laying flat. No meds. Denies noticing any alleviating factors other than sitting upright is better. Denies any associated fever, chills, chest pain, shortness of breath, left-sided flank pain, hematuria, dysuria, urinary urgency, abdominal pain, nausea, vomiting, diarrhea, hematochezia, melena, or any other concerns at this time. PAST MEDICAL / SURGICAL / SOCIAL / FAMILY HISTORY     PMH:  has a past medical history of Anxiety, Chronic back pain, Depression, Hypertension, Mass of adrenal gland ([address] Obesity, Solitary left kidney, Transient gluten sensitivity, and WPW (Emiliana-Parkinson-White syndrome).   Surgical History:  has a past surgical history that includes Tonsillectomy; Appendectomy; Arm Surgery; ablation of dysrhythmic focus; Upper gastrointestinal endoscopy (N/A, 2019); and Colonoscopy (N/A, 2019). Social History:  reports that she has been smoking cigarettes. She has a 5.00 pack-year smoking history. She has never used smokeless tobacco. She reports current drug use. Frequency: 7.00 times per week. Drug: Marijuana [place] [place]berry). She reports that she does not drink alcohol. Family History: She indicated that her mother is . She indicated that the status of her father is unknown. She indicated that her sister is alive. family history includes Cancer in her mother; Diabetes in her father and sister; Ovarian Cancer in her sister; Stroke in her mother. Psychiatric History: None    Allergies: Gluten meal and Pcn [penicillins]    Home Medications:   Prior to Admission medications    Medication Sig Start Date End Date Taking? Authorizing Provider   cyclobenzaprine (FLEXERIL) 10 MG tablet Take 1 tablet by mouth 3 times daily as needed for Muscle spasms 23 Yes Kirti Wylie DO   lidocaine 4 % external patch Place 1 patch onto the skin daily 1/16/23 2/15/23 Yes Kirti Wylie DO   cloNIDine (CATAPRES) 0.1 MG tablet Take 1 tablet by mouth 2 times daily 10/8/21   DK Morrow CNP   losartan (COZAAR) 50 MG tablet Take 1 tablet by mouth daily 10/6/21   DK Morrow CNP   buPROPion [facility] SR) 200 MG extended release tablet Take 1 tablet by mouth 2 times daily 21   DK Morrow CNP   HYDROcodone-acetaminophen (NORCO) 5-325 MG per tablet Take 1 tablet by mouth every 6 hours as needed for Pain.     Historical Provider, MD   metoprolol tartrate (LOPRESSOR) 50 MG tablet Take 1 tablet by mouth 2 times daily 21   DK Morrow CNP   albuterol sulfate  (90 Base) MCG/ACT inhaler Inhale 2 puffs into the lungs every 6 hours as needed for Wheezing or Shortness of Breath 8/25/21   DK Molina - CNP       REVIEW OF SYSTEMS  (2-9 systems for level 4, 10 ormore for level 5)      Review of Systems  See HPI. PHYSICAL EXAM  (up to 7 for level 4, 8 or more for level 5)      INITIAL VITALS:  height is 5' 2\" (1.575 m) and weight is 106.6 kg (235 lb). Her oral temperature is 99.1 °F (37.3 °C). Her blood pressure is 166/95 (abnormal) and her pulse is 81. Her respiration is 15 and oxygen saturation is 93%. Vital signs reviewed. Physical Exam    General:  Alert, cooperative, well-groomed, well-nourished, appears stated age, and is notable distress secondary to pain. Head:  Normocephalic, atraumatic, and without obvious abnormality. Eyes:  Sclerae/conjunctivae clear without injection, pallor, or icterus. Corneas clear without opacities. EOM's intact. ENT: Ears and nose are all without obvious masses lesion or deformity. Neck: Supple and symmetrical. Trachea midline. No jugular venous distention. Lungs:   No respiratory distress. Clear to auscultation bilaterally but diminished secondary to effort. No wheezes rhonchi or rales. Heart:  Tachycardic rate. Regular rhythm. No murmurs, rubs, or gallops. Abdomen:   Normoactive bowel sounds. Soft, nontender, nondistended without guarding or rebound. No palpable masses. There is right-sided CVA tenderness. Extremities: Warm and dry without erythema or edema. Skin: Soft, good turgor, and well-hydrated. Neurologic: GCS is 15 and no focal deficits are appreciated. Normal gait. Grossly normal motor and sensation. Speech clear. Psychiatric: Normal mood and affect. Normal behavior. Coherent thought process.      DIFFERENTIAL DIAGNOSIS / MDM     DDx: UTI, musculoskeletal pain, pyelonephritis, kidney stone, retroperitoneal hematoma, herniated disc, AAA rupture,  pancreatitis, splenic rupture, pneumonia     Patient presents to the emergency department for the complaint as described above. Vital signs reveal tachycardia and hypertension and patient is notably significantly in pain. Lungs are clear to auscultation but diminished due to patient effort/intolerance. Heart rate is tachycardic but regular. Abdomen is soft and nontender. There is right-sided CVA tenderness but also tenderness to palpation on exam.  She is a lot of difficulty shifting moving and laying flat to obtain the EKG which we did for her tachycardia. I suspect her tachycardia and hypertension likely secondary to pain. She does have some urinary frequency so could certainly be UTI versus kidney stone though she has an atrophied kidney on the right which is congenital per patient that has cysts. I will start with labs, urine, CT scan of the abdomen without contrast since she only has 1 kidney and it morphine and Dr. Antony Fernandez recommended Norflex. PLAN (LABS / IMAGING / EKG):  Orders Placed This Encounter   Procedures    CT ABDOMEN PELVIS WO CONTRAST Additional Contrast? None    CBC with Auto Differential    Basic Metabolic Panel w/ Reflex to MG    Hepatic Function Panel    Lipase    Urinalysis with Reflex to Culture    Microscopic Urinalysis    EKG 12 Lead    Insert peripheral IV       MEDICATIONS ORDERED:  Orders Placed This Encounter   Medications    morphine injection 4 mg    orphenadrine (NORFLEX) injection 60 mg    cyclobenzaprine (FLEXERIL) 10 MG tablet     Sig: Take 1 tablet by mouth 3 times daily as needed for Muscle spasms     Dispense:  21 tablet     Refill:  0    lidocaine 4 % external patch     Sig: Place 1 patch onto the skin daily     Dispense:  30 patch     Refill:  0       Controlled Substances Monitoring:     DIAGNOSTIC RESULTS     EKG: All EKG's are interpreted by the Emergency Department Physician who either signs or Co-signs this chart in the [address] a cardiologist.    See ED attending note. RADIOLOGY: All images are read by the radiologist and their interpretations are reviewed.     CT ABDOMEN PELVIS WO CONTRAST Additional Contrast? None    Result Date: 1/16/2023  EXAMINATION: CT OF THE ABDOMEN AND PELVIS WITHOUT CONTRAST 1/16/2023 9:52 pm TECHNIQUE: CT of the abdomen and pelvis was performed without the administration of intravenous contrast. Multiplanar reformatted images are provided for review. Automated exposure control, iterative reconstruction, and/or weight based adjustment of the mA/kV was utilized to reduce the radiation dose to as low as reasonably achievable. COMPARISON: 09/29/2022 HISTORY: ORDERING SYSTEM PROVIDED HISTORY: Right flank pain TECHNOLOGIST PROVIDED HISTORY: Right flank pain Decision Support Exception - unselect if not a suspected or confirmed emergency medical condition->Emergency Medical Condition (MA) Is the patient pregnant?->No Reason for Exam: Right flank pain FINDINGS: LOWER CHEST:  Visualized portion of the lower chest demonstrates no acute abnormality. KIDNEYS AND URINARY TRACT: Severely atrophied/hypoplastic right kidney. No renal calculi are identified. There is no evidence for hydronephrosis. The ureters are of normal course and caliber. ORGANS: Fatty liver. Visualized portions of the liver, spleen, pancreas, gallbladder, and adrenal glands demonstrate no acute abnormality. Unchanged 3 cm left adrenal adenoma. GI/BOWEL: No bowel obstruction. Appendix is not visualized no evidence of acute appendicitis. Diverticulosis with no evidence of diverticulitis. PELVIS: The bladder and pelvic organs are unremarkable. PERITONEUM/RETROPERITONEUM: No free air or free fluid is noted. No pathologically enlarged lymphadenopathy. The vasculature do not demonstrate acute abnormality. BONES/SOFT TISSUES: The osseous structures demonstrate no acute abnormality. No obstructive uropathy. Appendix is not visualized however there are no finding to suggest acute appendicitis. Fatty liver. Diverticulosis with no evidence of diverticulitis. Hypoplastic right kidney.  3 cm left adrenal adenoma.        LABS:  Results for orders placed or performed during the hospital encounter of 01/16/23   CBC with Auto Differential   Result Value Ref Range    WBC 7.6 3.5 - 11.0 k/uL    RBC 4.88 4.0 - 5.2 m/uL    Hemoglobin 14.3 12.0 - 16.0 g/dL    Hematocrit 43.9 36 - 46 %    MCV 90.0 80 - 100 fL    MCH 29.3 26 - 34 pg    MCHC 32.6 31 - 37 g/dL    RDW 14.8 12.5 - 15.4 %    Platelets 017 840 - 615 k/uL    MPV 11.2 8.0 - 14.0 fL    Seg Neutrophils 55 36 - 66 %    Lymphocytes 30 24 - 44 %    Monocytes 7 2 - 11 %    Eosinophils % 8 (H) 1 - 4 %    Basophils 0 0 - 2 %    Segs Absolute 4.15 1.8 - 7.7 k/uL    Absolute Lymph # 2.30 1.0 - 4.8 k/uL    Absolute Mono # 0.56 0.1 - 1.2 k/uL    Absolute Eos # 0.58 (H) 0.0 - 0.4 k/uL    Basophils Absolute 0.02 0.0 - 0.2 k/uL   Basic Metabolic Panel w/ Reflex to MG   Result Value Ref Range    Glucose 149 (H) 70 - 99 mg/dL    BUN 18 6 - 20 mg/dL    Creatinine 0.85 0.50 - 0.90 mg/dL    Est, Glom Filt Rate >60 >60 mL/min/1.73m2    Calcium 9.1 8.6 - 10.4 mg/dL    Sodium 141 135 - 144 mmol/L    Potassium 3.6 (L) 3.7 - 5.3 mmol/L    Chloride 104 98 - 107 mmol/L    CO2 25 20 - 31 mmol/L    Anion Gap 12 9 - 17 mmol/L   Hepatic Function Panel   Result Value Ref Range    Albumin 3.9 3.5 - 5.2 g/dL    Alkaline Phosphatase 92 35 - 104 U/L    ALT <5 (L) 5 - 33 U/L    AST 25 <32 U/L    Total Bilirubin 0.3 0.3 - 1.2 mg/dL    Bilirubin, Direct 0.1 <0.3 mg/dL    Bilirubin, Indirect 0.2 0.0 - 1.0 mg/dL    Total Protein 7.2 6.4 - 8.3 g/dL    Albumin/Globulin Ratio 1.2 1.0 - 2.5   Lipase   Result Value Ref Range    Lipase 58 13 - 60 U/L   Urinalysis with Reflex to Culture    Specimen: Urine   Result Value Ref Range    Color, UA Yellow Yellow    Turbidity UA Clear Clear    Glucose, Ur NEGATIVE NEGATIVE    Bilirubin Urine NEGATIVE NEGATIVE    Ketones, Urine NEGATIVE NEGATIVE    Specific Gravity, UA 1.015 1.005 - 1.030    Urine Hgb TRACE (A) NEGATIVE    pH, UA 7.0 5.0 - 8.0    Protein, UA TRACE (A) NEGATIVE    Urobilinogen, Urine Normal Normal    Nitrite, Urine NEGATIVE NEGATIVE    Leukocyte Esterase, Urine NEGATIVE NEGATIVE    Urinalysis Comments Microscopic performed on uncentrifuged urine -    Microscopic Urinalysis   Result Value Ref Range    WBC, UA 0 TO 2 0 - 5 /HPF    RBC, UA 0 TO 2 0 - 2 /HPF    Epithelial Cells UA 0 TO 2 0 - 5 /HPF    Bacteria, UA FEW (A) None    Other Observations UA (A) NOT REQ. Utilizing a urinalysis as the only screening method to exclude a potential uropathogen can be unreliable in many patient populations. Rapid screening tests are less sensitive than culture and if UTI is a clinical possibility, culture should be considered despite a negative urinalysis. EMERGENCY DEPARTMENT COURSE           Vitals:    Vitals:    01/16/23 2051 01/16/23 2130 01/16/23 2300   BP: (!) 198/134 (!) 191/96 (!) 166/95   Pulse: (!) 109 99 81   Resp: 18  15   Temp: 99.1 °F (37.3 °C)     TempSrc: Oral     SpO2: 97% 96% 93%   Weight: 106.6 kg (235 lb)     Height: 5' 2\" (1.575 m)       -------------------------  BP: (!) 166/95, Temp: 99.1 °F (37.3 °C), Heart Rate: 81, Resp: 15      RE-EVALUATION:  09:30 PM EDT Attending to complete all remaining care, diagnosis and disposition for this patient. We discussed this patient prior to my departure. My note will be refreshed to reflect these details, though I was not actively involved in this patient's care following the time stamp. CONSULTS:  None    PROCEDURES:  None    FINAL IMPRESSION      1. Lumbar paraspinal muscle spasm    2. Adenoma of left adrenal gland          DISPOSITION / PLAN     CONDITION ON DISPOSITION:   Good / Stable for discharge. PATIENT REFERRED TO:  DK Molina - CNP  [address]  [address] [place] [address]  [phone]    Schedule an appointment as soon as possible for a visit in 2 days      [facility] Emergency Department  [address] [facility].   [address] [facility] [address]  [phone]  Go to   If symptoms worsen    DISCHARGE MEDICATIONS:  Discharge Medication List as of 1/16/2023 11:07 PM        START taking these medications    Details   cyclobenzaprine (FLEXERIL) 10 MG tablet Take 1 tablet by mouth 3 times daily as needed for Muscle spasms, Disp-21 tablet, R-0Normal      lidocaine 4 % external patch Place 1 patch onto the skin daily, TransDERmal, DAILY Starting Mon 1/16/2023, Until Wed 2/15/2023, For 30 days, Disp-30 patch, R-0, Normal             Brit Sheffield PA-C   Emergency Medicine Physician Assistant    (Please note that portions of this note were completed with a voice recognition program.  Efforts were made to edit the dictations but occasionally words aremis-transcribed.)       Brit Sheffield PA-C  01/16/23 [id number]       Brit Sheffield PA-C  01/16/23 3289

## 2023-01-17 NOTE — ED PROVIDER NOTES
[address] Emergency Department  [address] [address] [address] [address] [facility] [address]  Phone: [phone]  Fax: [phone]      Attending Physician Attestation    I performed a history and physical examination of the patient and discussed management with the mid level provider. I reviewed the mid level provider's note and agree with the documented findings and plan of care. Any areas of disagreement are noted on the chart. I was personally present for the key portions of any procedures. I have documented in the chart those procedures where I was not present during the key portions. I have reviewed the emergency nurses triage note. I agree with the chief complaint, past medical history, past surgical history, allergies, medications, social and family history as documented unless otherwise noted below. Documentation of the HPI, Physical Exam and Medical Decision Making performed by mid level providers is based on my personal performance of the HPI, PE and MDM. For Physician Assistant/ Nurse Practitioner cases/documentation I have personally evaluated this patient and have completed at least one if not all key elements of the E/M (history, physical exam, and MDM). Additional findings are as noted. CHIEF COMPLAINT       Chief Complaint   Patient presents with    Back Pain     Low right radiating to flank. Began 0200. Denies injury         HISTORY OF PRESENT ILLNESS    Franci Jones is a 56 y.o. female who presents for evaluation of right flank pain. The patient reports that starting around 2 AM this morning she woke up with a sharp, stabbing, nonradiating, right-sided flank pain with associated nausea but no vomiting. She states that the pain lasted for several hours but then went away for few hours. Starting around 3 PM this afternoon the pain returned and has been constant. She took a muscle relaxer around 7:15 PM without improvement. She has not tried taking any pain medications.   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 She avoids NSAIDs secondary to her single functioning kidney. The patient has noticed some increased urinary frequency today but denies any dysuria, hematuria, abdominal pain, vaginal bleeding or vaginal discharge. The patient has history of appendectomy but denies any other abdominal surgeries. She denies fever, chills, headache, vision changes, neck pain, chest pain, shortness of breath, abdominal pain, bowel symptoms, focal weakness, numbness, tingling, vaginal bleeding, vaginal discharge, recent injury or illness. PAST MEDICAL HISTORY    has a past medical history of Anxiety, Chronic back pain, Depression, Hypertension, Mass of adrenal gland ([address] Obesity, Solitary left kidney, Transient gluten sensitivity, and WPW (Emiliana-Parkinson-White syndrome). SURGICAL HISTORY      has a past surgical history that includes Tonsillectomy; Appendectomy; Arm Surgery; ablation of dysrhythmic focus; Upper gastrointestinal endoscopy (N/A, 2/21/2019); and Colonoscopy (N/A, 2/21/2019). CURRENT MEDICATIONS       Discharge Medication List as of 1/16/2023 11:07 PM        CONTINUE these medications which have NOT CHANGED    Details   cloNIDine (CATAPRES) 0.1 MG tablet Take 1 tablet by mouth 2 times daily, Disp-60 tablet, R-3Normal      losartan (COZAAR) 50 MG tablet Take 1 tablet by mouth daily, Disp-90 tablet, R-0Normal      buPROPion (WELLBUTRIN SR) 200 MG extended release tablet Take 1 tablet by mouth 2 times daily, Disp-180 tablet, R-3Normal      HYDROcodone-acetaminophen (NORCO) 5-325 MG per tablet Take 1 tablet by mouth every 6 hours as needed for Pain. Historical Med      metoprolol tartrate (LOPRESSOR) 50 MG tablet Take 1 tablet by mouth 2 times daily, Disp-180 tablet, R-1Normal      albuterol sulfate  (90 Base) MCG/ACT inhaler Inhale 2 puffs into the lungs every 6 hours as needed for Wheezing or Shortness of Breath, Disp-1 Inhaler, R-3Normal             ALLERGIES     is allergic to gluten meal and pcn [penicillins]. FAMILY HISTORY     She indicated that her mother is . She indicated that the status of her father is unknown. She indicated that her sister is alive. family history includes Cancer in her mother; Diabetes in her father and sister; Ovarian Cancer in her sister; Stroke in her mother. SOCIAL HISTORY      reports that she has been smoking cigarettes. She has a 5.00 pack-year smoking history. She has never used smokeless tobacco. She reports current drug use. Frequency: 7.00 times per week. Drug: Marijuana Elfida Pacheco). She reports that she does not drink alcohol. PHYSICAL EXAM     INITIAL VITALS:  height is 5' 2\" (1.575 m) and weight is 106.6 kg (235 lb). Her oral temperature is 99.1 °F (37.3 °C). Her blood pressure is 166/95 (abnormal) and her pulse is 81. Her respiration is 15 and oxygen saturation is 93%. Heart regular rate and rhythm. Lungs clear to auscultation. Abdomen soft and nontender. No pain or McBurney's point. Negative Estes sign, no pulsatile mass, no rebound or guarding. She has right CVA tenderness to palpation and right lumbar paraspinal muscle tenderness to palpation and spasm. Mucous membranes moist.  Capillary refill less than 2 seconds. Normal perirectal tone and sensation. Downgoing Babinski's. Normal proprioception. Normal DTRs. DP/PT pulses 2/4 and equal bilaterally. Strength 5/5 with dorsi and plantar flexion of bilateral feet. Negative straight leg raise.       DIAGNOSTIC RESULTS     EKG: All EKG's are interpreted by the Emergency Department Physician who either signs or Co-signs this chart in the absence of a cardiologist.    EKG 2050 7 PM sinus rhythm, rate 90 bpm, normal axis, slightly prolonged  ms, right bundle branch block,  ms, normal NV interval, no ST elevation or depression, T wave inversion in V2 and V3, T wave flattening in V4, good R wave progression, Q wave in aVR, new abdominal branch block compared to EKG on 9/29/2021    RADIOLOGY:     CT ABDOMEN PELVIS WO CONTRAST Additional Contrast? None    Result Date: 1/16/2023  EXAMINATION: CT OF THE ABDOMEN AND PELVIS WITHOUT CONTRAST 1/16/2023 9:52 pm TECHNIQUE: CT of the abdomen and pelvis was performed without the administration of intravenous contrast. Multiplanar reformatted images are provided for review. Automated exposure control, iterative reconstruction, and/or weight based adjustment of the mA/kV was utilized to reduce the radiation dose to as low as reasonably achievable. COMPARISON: 09/29/2022 HISTORY: ORDERING SYSTEM PROVIDED HISTORY: Right flank pain TECHNOLOGIST PROVIDED HISTORY: Right flank pain Decision Support Exception - unselect if not a suspected or confirmed emergency medical condition->Emergency Medical Condition (MA) Is the patient pregnant?->No Reason for Exam: Right flank pain FINDINGS: LOWER CHEST:  Visualized portion of the lower chest demonstrates no acute abnormality. KIDNEYS AND URINARY TRACT: Severely atrophied/hypoplastic right kidney.  No renal calculi are identified. There is no evidence for hydronephrosis.  The ureters are of normal course and caliber. ORGANS: Fatty liver.  Visualized portions of the liver, spleen, pancreas, gallbladder, and adrenal glands demonstrate no acute abnormality.Unchanged 3 cm left adrenal adenoma. GI/BOWEL: No bowel obstruction.  Appendix is not visualized no evidence of acute appendicitis.  Diverticulosis with no evidence of diverticulitis. PELVIS: The bladder and pelvic organs are unremarkable. PERITONEUM/RETROPERITONEUM: No free air or free fluid is noted.  No pathologically enlarged lymphadenopathy.  The vasculature do not demonstrate acute abnormality. BONES/SOFT TISSUES: The osseous structures demonstrate no acute abnormality.     No obstructive uropathy.  Appendix is not visualized however there are no finding to suggest  acute appendicitis. Fatty liver. Diverticulosis with no evidence of diverticulitis. Hypoplastic right kidney. 3 cm left adrenal adenoma.         LABS:  Results for orders placed or performed during the hospital encounter of 01/16/23   CBC with Auto Differential   Result Value Ref Range    WBC 7.6 3.5 - 11.0 k/uL    RBC 4.88 4.0 - 5.2 m/uL    Hemoglobin 14.3 12.0 - 16.0 g/dL    Hematocrit 43.9 36 - 46 %    MCV 90.0 80 - 100 fL    MCH 29.3 26 - 34 pg    MCHC 32.6 31 - 37 g/dL    RDW 14.8 12.5 - 15.4 %    Platelets 010 613 - 872 k/uL    MPV 11.2 8.0 - 14.0 fL    Seg Neutrophils 55 36 - 66 %    Lymphocytes 30 24 - 44 %    Monocytes 7 2 - 11 %    Eosinophils % 8 (H) 1 - 4 %    Basophils 0 0 - 2 %    Segs Absolute 4.15 1.8 - 7.7 k/uL    Absolute Lymph # 2.30 1.0 - 4.8 k/uL    Absolute Mono # 0.56 0.1 - 1.2 k/uL    Absolute Eos # 0.58 (H) 0.0 - 0.4 k/uL    Basophils Absolute 0.02 0.0 - 0.2 k/uL   Basic Metabolic Panel w/ Reflex to MG   Result Value Ref Range    Glucose 149 (H) 70 - 99 mg/dL    BUN 18 6 - 20 mg/dL    Creatinine 0.85 0.50 - 0.90 mg/dL    Est, Glom Filt Rate >60 >60 mL/min/1.73m2    Calcium 9.1 8.6 - 10.4 mg/dL    Sodium 141 135 - 144 mmol/L    Potassium 3.6 (L) 3.7 - 5.3 mmol/L    Chloride 104 98 - 107 mmol/L    CO2 25 20 - 31 mmol/L    Anion Gap 12 9 - 17 mmol/L   Hepatic Function Panel   Result Value Ref Range    Albumin 3.9 3.5 - 5.2 g/dL    Alkaline Phosphatase 92 35 - 104 U/L    ALT <5 (L) 5 - 33 U/L    AST 25 <32 U/L    Total Bilirubin 0.3 0.3 - 1.2 mg/dL    Bilirubin, Direct 0.1 <0.3 mg/dL    Bilirubin, Indirect 0.2 0.0 - 1.0 mg/dL    Total Protein 7.2 6.4 - 8.3 g/dL    Albumin/Globulin Ratio 1.2 1.0 - 2.5   Lipase   Result Value Ref Range    Lipase 58 13 - 60 U/L   Urinalysis with Reflex to Culture    Specimen: Urine   Result Value Ref Range    Color, UA Yellow Yellow    Turbidity UA Clear Clear    Glucose, Ur NEGATIVE NEGATIVE    Bilirubin Urine NEGATIVE NEGATIVE    Ketones, Urine NEGATIVE NEGATIVE Specific Gravity, UA 1.015 1.005 - 1.030    Urine Hgb TRACE (A) NEGATIVE    pH, UA 7.0 5.0 - 8.0    Protein, UA TRACE (A) NEGATIVE    Urobilinogen, Urine Normal Normal    Nitrite, Urine NEGATIVE NEGATIVE    Leukocyte Esterase, Urine NEGATIVE NEGATIVE    Urinalysis Comments Microscopic performed on uncentrifuged urine -    Microscopic Urinalysis   Result Value Ref Range    WBC, UA 0 TO 2 0 - 5 /HPF    RBC, UA 0 TO 2 0 - 2 /HPF    Epithelial Cells UA 0 TO 2 0 - 5 /HPF    Bacteria, UA FEW (A) None    Other Observations UA (A) NOT REQ. Utilizing a urinalysis as the only screening method to exclude a potential uropathogen can be unreliable in many patient populations. Rapid screening tests are less sensitive than culture and if UTI is a clinical possibility, culture should be considered despite a negative urinalysis. EMERGENCY DEPARTMENT COURSE:   Vitals:    Vitals:    01/16/23 2051 01/16/23 2130 01/16/23 2300   BP: (!) 198/134 (!) 191/96 (!) 166/95   Pulse: (!) 109 99 81   Resp: 18  15   Temp: 99.1 °F (37.3 °C)     TempSrc: Oral     SpO2: 97% 96% 93%   Weight: 106.6 kg (235 lb)     Height: 5' 2\" (1.575 m)       -------------------------  BP: (!) 166/95, Temp: 99.1 °F (37.3 °C), Heart Rate: 81, Resp: 15      PERTINENT ATTENDING PHYSICIAN COMMENTS:    The patient is a 62-year-old female who presents for evaluation of right flank pain. Vital signs are stable. She has right CVA tenderness and right lumbar paraspinal muscle tenderness to palpation. She is neurovascularly intact. Abdomen soft and nontender. Urinalysis, CBC, BMP, LFTs, lipase, and CT abdomen pelvis are unremarkable except for a fatty liver, 3 cm left adrenal adenoma and hypoplastic right kidney. Her pain significantly improved with morphine and Norflex. I suspect her pain is secondary to a lumbar paraspinal muscle spasm.   I have low suspicion for ACS, PE, dissection, esophageal rupture, cardiac tamponade, pneumothorax, acute abdomen, perforation or obstruction. She was instructed to take Tylenol as needed for pain. She cannot take NSAIDs secondary to her hypoplastic kidney. She was instructed to take Flexeril as needed for muscle spasm and I also prescribed her Lidoderm patches. She was instructed to follow-up with her PCP within 1 to 2 days and to return to the ER for worsening symptoms or any other concern. The patient understands that at this time there is no evidence for a more malignant underlying process, but also understands that early in the process of an illness or injury, an emergency department work-up can be falsely reassuring. Routine discharge counseling was given, and the patient understands that worsening, changing or persistent symptoms should prompt a immediate call or follow-up with their primary care physician or return to the emergency department. The importance of appropriate follow-up was also discussed. I have reviewed the disposition diagnosis with the patient. I have answered their questions and given discharge instructions. They voiced understanding of these instructions and did not have any further questions or complaints. Diagnosis:  1. Lumbar paraspinal muscle spasm    2.  Adenoma of left adrenal gland              (Please note that portions of this note were completed with a voice recognition program.  Efforts were made to edit the dictations but occasionally words are mis-transcribed.)    Brittny Brown DO, [address] [facility]  Attending Emergency Medicine Physician       Brittny Brown DO  01/17/23 0145

## 2023-01-18 LAB
EKG ATRIAL RATE: 98 BPM
EKG P AXIS: 59 DEGREES
EKG P-R INTERVAL: 148 MS
EKG Q-T INTERVAL: 398 MS
EKG QRS DURATION: 120 MS
EKG QTC CALCULATION (BAZETT): 508 MS
EKG R AXIS: 54 DEGREES
EKG T AXIS: 14 DEGREES
EKG VENTRICULAR RATE: 98 BPM

## 2023-05-13 ENCOUNTER — APPOINTMENT (OUTPATIENT)
Dept: GENERAL RADIOLOGY | Age: 58
End: 2023-05-13
Payer: COMMERCIAL

## 2023-05-13 ENCOUNTER — HOSPITAL ENCOUNTER (EMERGENCY)
Age: 58
Discharge: HOME | End: 2023-05-13
Attending: EMERGENCY MEDICINE
Payer: COMMERCIAL

## 2023-05-13 VITALS
DIASTOLIC BLOOD PRESSURE: 82 MMHG | TEMPERATURE: 98.2 F | SYSTOLIC BLOOD PRESSURE: 138 MMHG | RESPIRATION RATE: 18 BRPM | HEART RATE: 53 BPM | OXYGEN SATURATION: 95 %

## 2023-05-13 DIAGNOSIS — M54.6 ACUTE RIGHT-SIDED THORACIC BACK PAIN: Primary | ICD-10-CM

## 2023-05-13 DIAGNOSIS — R03.0 ELEVATED BLOOD PRESSURE READING: ICD-10-CM

## 2023-05-13 DIAGNOSIS — R11.0 NAUSEA: ICD-10-CM

## 2023-05-13 LAB
ABSOLUTE EOS #: 0.4 K/UL (ref 0–0.4)
ABSOLUTE LYMPH #: 1.6 K/UL (ref 1–4.8)
ABSOLUTE MONO #: 0.4 K/UL (ref 0.1–1.2)
ALBUMIN SERPL-MCNC: 3.8 G/DL (ref 3.5–5.2)
ALBUMIN/GLOBULIN RATIO: 1.2 (ref 1–2.5)
ALP SERPL-CCNC: 64 U/L (ref 35–104)
ALT SERPL-CCNC: 51 U/L (ref 5–33)
ANION GAP SERPL CALCULATED.3IONS-SCNC: 8 MMOL/L (ref 9–17)
AST SERPL-CCNC: 49 U/L
BASOPHILS # BLD: 1 % (ref 0–2)
BASOPHILS ABSOLUTE: 0 K/UL (ref 0–0.2)
BILIRUB SERPL-MCNC: 0.3 MG/DL (ref 0.3–1.2)
BUN SERPL-MCNC: 18 MG/DL (ref 6–20)
CALCIUM SERPL-MCNC: 8.9 MG/DL (ref 8.6–10.4)
CHLORIDE SERPL-SCNC: 106 MMOL/L (ref 98–107)
CO2 SERPL-SCNC: 24 MMOL/L (ref 20–31)
CREAT SERPL-MCNC: 0.78 MG/DL (ref 0.5–0.9)
EKG ATRIAL RATE: 85 BPM
EKG P AXIS: 59 DEGREES
EKG P-R INTERVAL: 154 MS
EKG Q-T INTERVAL: 416 MS
EKG QRS DURATION: 90 MS
EKG QTC CALCULATION (BAZETT): 495 MS
EKG R AXIS: 39 DEGREES
EKG T AXIS: 53 DEGREES
EKG VENTRICULAR RATE: 85 BPM
EOSINOPHILS RELATIVE PERCENT: 7 % (ref 1–4)
GFR SERPL CREATININE-BSD FRML MDRD: >60 ML/MIN/1.73M2
GLUCOSE SERPL-MCNC: 116 MG/DL (ref 70–99)
HCT VFR BLD AUTO: 42.6 % (ref 36–46)
HGB BLD-MCNC: 14.1 G/DL (ref 12–16)
LIPASE SERPL-CCNC: 37 U/L (ref 13–60)
LYMPHOCYTES # BLD: 28 % (ref 24–44)
MCH RBC QN AUTO: 29.1 PG (ref 26–34)
MCHC RBC AUTO-ENTMCNC: 33.1 G/DL (ref 31–37)
MCV RBC AUTO: 88.1 FL (ref 80–100)
MONOCYTES # BLD: 7 % (ref 2–11)
PDW BLD-RTO: 14.5 % (ref 12.5–15.4)
PLATELET # BLD AUTO: 174 K/UL (ref 140–450)
PMV BLD AUTO: 9.1 FL (ref 6–12)
POTASSIUM SERPL-SCNC: 3.8 MMOL/L (ref 3.7–5.3)
PROT SERPL-MCNC: 7.1 G/DL (ref 6.4–8.3)
RBC # BLD: 4.84 M/UL (ref 4–5.2)
SEG NEUTROPHILS: 57 % (ref 36–66)
SEGMENTED NEUTROPHILS ABSOLUTE COUNT: 3.3 K/UL (ref 1.8–7.7)
SODIUM SERPL-SCNC: 138 MMOL/L (ref 135–144)
TROPONIN I SERPL DL<=0.01 NG/ML-MCNC: 7 NG/L (ref 0–14)
WBC # BLD AUTO: 5.7 K/UL (ref 3.5–11)

## 2023-05-13 PROCEDURE — 71045 X-RAY EXAM CHEST 1 VIEW: CPT

## 2023-05-13 PROCEDURE — 99284 EMERGENCY DEPT VISIT MOD MDM: CPT

## 2023-05-13 PROCEDURE — 6370000000 HC RX 637 (ALT 250 FOR IP): Performed by: PHYSICIAN ASSISTANT

## 2023-05-13 PROCEDURE — 80053 COMPREHEN METABOLIC PANEL: CPT

## 2023-05-13 PROCEDURE — 84484 ASSAY OF TROPONIN QUANT: CPT

## 2023-05-13 PROCEDURE — 85025 COMPLETE CBC W/AUTO DIFF WBC: CPT

## 2023-05-13 PROCEDURE — 83690 ASSAY OF LIPASE: CPT

## 2023-05-13 PROCEDURE — 36415 COLL VENOUS BLD VENIPUNCTURE: CPT

## 2023-05-13 PROCEDURE — 93005 ELECTROCARDIOGRAM TRACING: CPT | Performed by: EMERGENCY MEDICINE

## 2023-05-13 RX ORDER — METOPROLOL TARTRATE 50 MG/1
50 TABLET, FILM COATED ORAL ONCE
Status: COMPLETED | OUTPATIENT
Start: 2023-05-13 | End: 2023-05-13

## 2023-05-13 RX ORDER — CYCLOBENZAPRINE HCL 10 MG
10 TABLET ORAL 3 TIMES DAILY PRN
Qty: 21 TABLET | Refills: 0 | Status: SHIPPED | OUTPATIENT
Start: 2023-05-13

## 2023-05-13 RX ORDER — LIDOCAINE 4 G/G
1 PATCH TOPICAL DAILY
Status: DISCONTINUED | OUTPATIENT
Start: 2023-05-13 | End: 2023-05-13 | Stop reason: HOSPADM

## 2023-05-13 RX ORDER — ONDANSETRON 4 MG/1
4 TABLET, ORALLY DISINTEGRATING ORAL EVERY 8 HOURS PRN
Status: DISCONTINUED | OUTPATIENT
Start: 2023-05-13 | End: 2023-05-13 | Stop reason: HOSPADM

## 2023-05-13 RX ORDER — ONDANSETRON 4 MG/1
4 TABLET, FILM COATED ORAL EVERY 8 HOURS PRN
Qty: 6 TABLET | Refills: 0 | Status: SHIPPED | OUTPATIENT
Start: 2023-05-13

## 2023-05-13 RX ORDER — ACETAMINOPHEN 500 MG
1000 TABLET ORAL ONCE
Status: COMPLETED | OUTPATIENT
Start: 2023-05-13 | End: 2023-05-13

## 2023-05-13 RX ADMIN — ONDANSETRON 4 MG: 4 TABLET, ORALLY DISINTEGRATING ORAL at 12:59

## 2023-05-13 RX ADMIN — ACETAMINOPHEN 1000 MG: 500 TABLET ORAL at 11:01

## 2023-05-13 RX ADMIN — METOPROLOL TARTRATE 50 MG: 50 TABLET, FILM COATED ORAL at 11:01

## 2023-05-13 ASSESSMENT — ENCOUNTER SYMPTOMS
VOMITING: 0
EYE PAIN: 0
EYE DISCHARGE: 0
NAUSEA: 0
EYE ITCHING: 0
BACK PAIN: 1
SORE THROAT: 0
WHEEZING: 0
RHINORRHEA: 0
COLOR CHANGE: 0
COUGH: 0

## 2023-05-13 ASSESSMENT — PAIN SCALES - GENERAL
PAINLEVEL_OUTOF10: 9
PAINLEVEL_OUTOF10: 6

## 2023-05-13 ASSESSMENT — PAIN - FUNCTIONAL ASSESSMENT: PAIN_FUNCTIONAL_ASSESSMENT: 0-10

## 2023-05-13 NOTE — DISCHARGE INSTRUCTIONS
Rest.  No heavy lifting. No driving or working while taking flexeril as it may make you sleepy    You may also take 1000 mg of Tylenol every 8 hours as needed for pain. You may purchase lidocaine cream or lidocaine patches over-the-counter and use as directed. Heat to the area such as heating pad may help. Do not sleep on heating pad    Please return to the ED for increased pain, numbness, tingling, weakness, bladder or bowel dysfunction or other concerns deemed emergent.

## 2023-05-13 NOTE — ED PROVIDER NOTES
[facility] [address]  eMERGENCY dEPARTMENT eNCOUnter   Independent Attestation     Pt Name: Vincent Moraes  MRN: [id number]  [place] 1965  Date of evaluation: 5/13/23       Vincent Moraes is a 66 y.o. female who presents with Back Pain and Chest Pain (Pt reports pain between shoulder blades started a couple of days ago, more constant today. She also reports stinging sensation in left chest area this morning as well, relieved at this time. )        Based on the medical record, the care appears appropriate. I was personally available for consultation in the Emergency Department.     Christina Rae DO  Attending Emergency  Physician                Christina Rae DO  05/13/23 6259
Blood work does not show any acute abnormality, I did a EKG which showed a normal sinus rhythm, QTc of 495. Likely secondary to muscle strain, we did get a lipase to rule out cholecystitis though her symptoms are not worse after eating. ED Course as of 05/13/23 1221   Sat May 13, 2023   1215 Results with the patient. She is feeling slightly nauseous now but overall has had improvement with the lidocaine patch. She has been try to get into her old primary care provider early next week. [HYUN]      ED Course User Index  [HYUN] Derek Portre PA-C       RADIOLOGY:   I directly visualized (with the attending physician) the following  imagesand reviewed the radiologist interpretations:  No results found. XR CHEST (SINGLE VIEW FRONTAL)   Preliminary Result   No acute process.              LABS:  Results for orders placed or performed during the hospital encounter of 05/13/23   CBC with Auto Differential   Result Value Ref Range    WBC 5.7 3.5 - 11.0 k/uL    RBC 4.84 4.0 - 5.2 m/uL    Hemoglobin 14.1 12.0 - 16.0 g/dL    Hematocrit 42.6 36 - 46 %    MCV 88.1 80 - 100 fL    MCH 29.1 26 - 34 pg    MCHC 33.1 31 - 37 g/dL    RDW 14.5 12.5 - 15.4 %    Platelets 349 026 - 948 k/uL    MPV 9.1 6.0 - 12.0 fL    Seg Neutrophils 57 36 - 66 %    Lymphocytes 28 24 - 44 %    Monocytes 7 2 - 11 %    Eosinophils % 7 (H) 1 - 4 %    Basophils 1 0 - 2 %    Segs Absolute 3.30 1.8 - 7.7 k/uL    Absolute Lymph # 1.60 1.0 - 4.8 k/uL    Absolute Mono # 0.40 0.1 - 1.2 k/uL    Absolute Eos # 0.40 0.0 - 0.4 k/uL    Basophils Absolute 0.00 0.0 - 0.2 k/uL   Comprehensive Metabolic Panel w/ Reflex to MG   Result Value Ref Range    Glucose 116 (H) 70 - 99 mg/dL    BUN 18 6 - 20 mg/dL    Creatinine 0.78 0.50 - 0.90 mg/dL    Est, Glom Filt Rate >60 >60 mL/min/1.73m2    Calcium 8.9 8.6 - 10.4 mg/dL    Sodium 138 135 - 144 mmol/L    Potassium 3.8 3.7 - 5.3 mmol/L    Chloride 106 98 - 107 mmol/L    CO2 24 20 - 31 mmol/L    Anion Gap 8 (L) 9 - 17 mmol/L

## 2023-05-13 NOTE — ED TRIAGE NOTES
Pt report having hot flashes  started last night, nausea this am without vomiting which is chronic every day, denies nausea at this time

## 2023-05-15 LAB
EKG ATRIAL RATE: 85 BPM
EKG P AXIS: 59 DEGREES
EKG P-R INTERVAL: 154 MS
EKG Q-T INTERVAL: 416 MS
EKG QRS DURATION: 90 MS
EKG QTC CALCULATION (BAZETT): 495 MS
EKG R AXIS: 39 DEGREES
EKG T AXIS: 53 DEGREES
EKG VENTRICULAR RATE: 85 BPM

## 2023-05-31 RX ORDER — METOPROLOL TARTRATE 50 MG/1
50 TABLET, FILM COATED ORAL 2 TIMES DAILY
Qty: 180 TABLET | Refills: 1 | Status: SHIPPED | OUTPATIENT
Start: 2023-05-31

## 2023-05-31 NOTE — TELEPHONE ENCOUNTER
----- Message from Milan Nelson sent at 5/31/2023  1:12 PM EDT -----  Subject: Refill Request    QUESTIONS  Name of Medication? metoprolol tartrate (LOPRESSOR) 50 MG tablet  Patient-reported dosage and instructions? 50mg bid  How many days do you have left? 0  Preferred Pharmacy? [address]  Pharmacy phone number (if available)? [phone]  ---------------------------------------------------------------------------  --------------  CALL BACK INFO  What is the best way for the office to contact you? OK to leave message on   voicemail  Preferred Call Back Phone Number? [phone]  ---------------------------------------------------------------------------  --------------  SCRIPT ANSWERS  Relationship to Patient?  Self

## 2023-06-20 ENCOUNTER — HOSPITAL ENCOUNTER (EMERGENCY)
Age: 58
Discharge: HOME | End: 2023-06-20
Attending: EMERGENCY MEDICINE
Payer: COMMERCIAL

## 2023-06-20 VITALS
TEMPERATURE: 97.8 F | OXYGEN SATURATION: 96 % | HEART RATE: 94 BPM | RESPIRATION RATE: 20 BRPM | SYSTOLIC BLOOD PRESSURE: 150 MMHG | DIASTOLIC BLOOD PRESSURE: 84 MMHG

## 2023-06-20 DIAGNOSIS — T30.4 CHEMICAL BURN: Primary | ICD-10-CM

## 2023-06-20 PROCEDURE — 6360000002 HC RX W HCPCS: Performed by: NURSE PRACTITIONER

## 2023-06-20 PROCEDURE — 99284 EMERGENCY DEPT VISIT MOD MDM: CPT

## 2023-06-20 PROCEDURE — 90471 IMMUNIZATION ADMIN: CPT | Performed by: NURSE PRACTITIONER

## 2023-06-20 PROCEDURE — 6370000000 HC RX 637 (ALT 250 FOR IP): Performed by: NURSE PRACTITIONER

## 2023-06-20 PROCEDURE — 96372 THER/PROPH/DIAG INJ SC/IM: CPT

## 2023-06-20 PROCEDURE — 90715 TDAP VACCINE 7 YRS/> IM: CPT | Performed by: NURSE PRACTITIONER

## 2023-06-20 RX ORDER — KETOROLAC TROMETHAMINE 30 MG/ML
30 INJECTION, SOLUTION INTRAMUSCULAR; INTRAVENOUS ONCE
Status: COMPLETED | OUTPATIENT
Start: 2023-06-20 | End: 2023-06-20

## 2023-06-20 RX ORDER — GINSENG 100 MG
CAPSULE ORAL 2 TIMES DAILY
Status: DISCONTINUED | OUTPATIENT
Start: 2023-06-20 | End: 2023-06-20 | Stop reason: HOSPADM

## 2023-06-20 RX ADMIN — KETOROLAC TROMETHAMINE 30 MG: 30 INJECTION, SOLUTION INTRAMUSCULAR; INTRAVENOUS at 09:36

## 2023-06-20 RX ADMIN — BACITRACIN: 500 OINTMENT TOPICAL at 09:37

## 2023-06-20 RX ADMIN — TETANUS TOXOID, REDUCED DIPHTHERIA TOXOID AND ACELLULAR PERTUSSIS VACCINE, ADSORBED 0.5 ML: 5; 2.5; 8; 8; 2.5 SUSPENSION INTRAMUSCULAR at 09:37
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
## 2023-06-22 ENCOUNTER — HOSPITAL ENCOUNTER (OUTPATIENT)
Age: 58
Setting detail: SPECIMEN
Discharge: HOME | End: 2023-06-22

## 2023-06-22 ENCOUNTER — OFFICE VISIT (OUTPATIENT)
Dept: PRIMARY CARE CLINIC | Age: 58
End: 2023-06-22
Payer: COMMERCIAL

## 2023-06-22 VITALS
SYSTOLIC BLOOD PRESSURE: 136 MMHG | HEIGHT: 62 IN | OXYGEN SATURATION: 94 % | WEIGHT: 250.4 LBS | HEART RATE: 84 BPM | DIASTOLIC BLOOD PRESSURE: 84 MMHG | BODY MASS INDEX: 46.08 KG/M2 | RESPIRATION RATE: 12 BRPM

## 2023-06-22 DIAGNOSIS — Z13.1 SCREENING FOR DIABETES MELLITUS: ICD-10-CM

## 2023-06-22 DIAGNOSIS — Z13.220 SCREENING FOR LIPID DISORDERS: ICD-10-CM

## 2023-06-22 DIAGNOSIS — Z11.59 NEED FOR HEPATITIS C SCREENING TEST: ICD-10-CM

## 2023-06-22 DIAGNOSIS — Z13.0 SCREENING FOR DEFICIENCY ANEMIA: ICD-10-CM

## 2023-06-22 DIAGNOSIS — Z00.00 ENCOUNTER FOR GENERAL ADULT MEDICAL EXAMINATION W/O ABNORMAL FINDINGS: Primary | ICD-10-CM

## 2023-06-22 DIAGNOSIS — Z13.29 SCREENING FOR THYROID DISORDER: ICD-10-CM

## 2023-06-22 DIAGNOSIS — Z11.4 ENCOUNTER FOR SCREENING FOR HIV: ICD-10-CM

## 2023-06-22 DIAGNOSIS — Z12.31 VISIT FOR SCREENING MAMMOGRAM: ICD-10-CM

## 2023-06-22 LAB
ALBUMIN SERPL-MCNC: 3.9 G/DL (ref 3.5–5.2)
ALBUMIN/GLOB SERPL: 1.4 {RATIO} (ref 1–2.5)
ALP SERPL-CCNC: 73 U/L (ref 35–104)
ALT SERPL-CCNC: 40 U/L (ref 5–33)
ANION GAP SERPL CALCULATED.3IONS-SCNC: 10 MMOL/L (ref 9–17)
AST SERPL-CCNC: 31 U/L
BILIRUB SERPL-MCNC: 0.3 MG/DL (ref 0.3–1.2)
BUN SERPL-MCNC: 24 MG/DL (ref 6–20)
CALCIUM SERPL-MCNC: 9.8 MG/DL (ref 8.6–10.4)
CHLORIDE SERPL-SCNC: 106 MMOL/L (ref 98–107)
CHOLEST SERPL-MCNC: 235 MG/DL
CHOLESTEROL/HDL RATIO: 5.1
CO2 SERPL-SCNC: 28 MMOL/L (ref 20–31)
CREAT SERPL-MCNC: 1.07 MG/DL (ref 0.5–0.9)
ERYTHROCYTE [DISTWIDTH] IN BLOOD BY AUTOMATED COUNT: 14.2 % (ref 11.8–14.4)
GFR SERPL CREATININE-BSD FRML MDRD: >60 ML/MIN/1.73M2
GLUCOSE SERPL-MCNC: 103 MG/DL (ref 70–99)
HCT VFR BLD AUTO: 44.9 % (ref 36.3–47.1)
HCV AB SERPL QL IA: NONREACTIVE
HDLC SERPL-MCNC: 46 MG/DL
HGB BLD-MCNC: 13.6 G/DL (ref 11.9–15.1)
HIV 1+2 AB+HIV1 P24 AG SERPL QL IA: NONREACTIVE
LDLC SERPL CALC-MCNC: 122 MG/DL (ref 0–130)
MCH RBC QN AUTO: 29.3 PG (ref 25.2–33.5)
MCHC RBC AUTO-ENTMCNC: 30.3 G/DL (ref 28.4–34.8)
MCV RBC AUTO: 96.8 FL (ref 82.6–102.9)
NRBC AUTOMATED: 0 PER 100 WBC
PLATELET # BLD AUTO: 189 K/UL (ref 138–453)
PMV BLD AUTO: 12.4 FL (ref 8.1–13.5)
POTASSIUM SERPL-SCNC: 5 MMOL/L (ref 3.7–5.3)
PROT SERPL-MCNC: 6.7 G/DL (ref 6.4–8.3)
RBC # BLD AUTO: 4.64 M/UL (ref 3.95–5.11)
SODIUM SERPL-SCNC: 144 MMOL/L (ref 135–144)
TRIGL SERPL-MCNC: 333 MG/DL
TSH SERPL-MCNC: 2.27 UIU/ML (ref 0.3–5)
WBC OTHER # BLD: 6.5 K/UL (ref 3.5–11.3)

## 2023-06-22 PROCEDURE — 99396 PREV VISIT EST AGE 40-64: CPT | Performed by: NURSE PRACTITIONER

## 2023-06-22 PROCEDURE — 3079F DIAST BP 80-89 MM HG: CPT | Performed by: NURSE PRACTITIONER

## 2023-06-22 PROCEDURE — 3075F SYST BP GE 130 - 139MM HG: CPT | Performed by: NURSE PRACTITIONER

## 2023-06-22 RX ORDER — METHOCARBAMOL 750 MG/1
750 TABLET, FILM COATED ORAL 3 TIMES DAILY PRN
Qty: 90 TABLET | Refills: 1 | Status: SHIPPED | OUTPATIENT
Start: 2023-06-22

## 2023-06-22 RX ORDER — ESOMEPRAZOLE MAGNESIUM 40 MG/1
40 CAPSULE, DELAYED RELEASE ORAL DAILY
Qty: 90 CAPSULE | Refills: 3 | Status: SHIPPED | OUTPATIENT
Start: 2023-06-22

## 2023-06-22 RX ORDER — ONDANSETRON 4 MG/1
4 TABLET, FILM COATED ORAL EVERY 8 HOURS PRN
Qty: 90 TABLET | Refills: 1 | Status: SHIPPED | OUTPATIENT
Start: 2023-06-22

## 2023-06-22 RX ORDER — DOCUSATE SODIUM 100 MG/1
100 CAPSULE, LIQUID FILLED ORAL 2 TIMES DAILY
Qty: 60 CAPSULE | Refills: 3 | Status: SHIPPED | OUTPATIENT
Start: 2023-06-22 | End: 2023-06-22 | Stop reason: SDUPTHER

## 2023-06-22 RX ORDER — DOCUSATE SODIUM 100 MG/1
100 CAPSULE, LIQUID FILLED ORAL 2 TIMES DAILY
Qty: 60 CAPSULE | Refills: 3 | Status: SHIPPED | OUTPATIENT
Start: 2023-06-22

## 2023-06-22 SDOH — ECONOMIC STABILITY: FOOD INSECURITY: WITHIN THE PAST 12 MONTHS, YOU WORRIED THAT YOUR FOOD WOULD RUN OUT BEFORE YOU GOT MONEY TO BUY MORE.: NEVER TRUE

## 2023-06-22 SDOH — ECONOMIC STABILITY: FOOD INSECURITY: WITHIN THE PAST 12 MONTHS, THE FOOD YOU BOUGHT JUST DIDN'T LAST AND YOU DIDN'T HAVE MONEY TO GET MORE.: NEVER TRUE

## 2023-06-22 SDOH — ECONOMIC STABILITY: INCOME INSECURITY: HOW HARD IS IT FOR YOU TO PAY FOR THE VERY BASICS LIKE FOOD, HOUSING, MEDICAL CARE, AND HEATING?: NOT HARD AT ALL

## 2023-06-22 ASSESSMENT — PATIENT HEALTH QUESTIONNAIRE - PHQ9
SUM OF ALL RESPONSES TO PHQ QUESTIONS 1-9: 9
3. TROUBLE FALLING OR STAYING ASLEEP: 3
7. TROUBLE CONCENTRATING ON THINGS, SUCH AS READING THE NEWSPAPER OR WATCHING TELEVISION: 0
SUM OF ALL RESPONSES TO PHQ QUESTIONS 1-9: 9
4. FEELING TIRED OR HAVING LITTLE ENERGY: 3
SUM OF ALL RESPONSES TO PHQ9 QUESTIONS 1 & 2: 1
SUM OF ALL RESPONSES TO PHQ QUESTIONS 1-9: 9
8. MOVING OR SPEAKING SO SLOWLY THAT OTHER PEOPLE COULD HAVE NOTICED. OR THE OPPOSITE, BEING SO FIGETY OR RESTLESS THAT YOU HAVE BEEN MOVING AROUND A LOT MORE THAN USUAL: 0
10. IF YOU CHECKED OFF ANY PROBLEMS, HOW DIFFICULT HAVE THESE PROBLEMS MADE IT FOR YOU TO DO YOUR WORK, TAKE CARE OF THINGS AT HOME, OR GET ALONG WITH OTHER PEOPLE: 1
SUM OF ALL RESPONSES TO PHQ QUESTIONS 1-9: 9
9. THOUGHTS THAT YOU WOULD BE BETTER OFF DEAD, OR OF HURTING YOURSELF: 0
DEPRESSION UNABLE TO ASSESS: PT REFUSES
6. FEELING BAD ABOUT YOURSELF - OR THAT YOU ARE A FAILURE OR HAVE LET YOURSELF OR YOUR FAMILY DOWN: 0
5. POOR APPETITE OR OVEREATING: 2
1. LITTLE INTEREST OR PLEASURE IN DOING THINGS: 0
2. FEELING DOWN, DEPRESSED OR HOPELESS: 1

## 2023-06-22 ASSESSMENT — ENCOUNTER SYMPTOMS
NAUSEA: 1
ABDOMINAL PAIN: 0
COUGH: 0
SHORTNESS OF BREATH: 0
BACK PAIN: 1

## 2023-06-22 NOTE — PROGRESS NOTES
Status:   Future     Standing Expiration Date:   6/22/2024    [facility], Carano, CNP, Gynecology, [place]     Referral Priority:   Routine     Referral Type:   Eval and Treat     Referral Reason:   Specialty Services Required     Referred to Provider:   DK Zhong CNP     Requested Specialty:   Family Nurse Practitioner     Number of Visits Requested:   1        Orders Placed This Encounter   Medications    ondansetron (ZOFRAN) 4 MG tablet     Sig: Take 1 tablet by mouth every 8 hours as needed for Nausea or Vomiting     Dispense:  90 tablet     Refill:  1    methocarbamol (ROBAXIN-750) 750 MG tablet     Sig: Take 1 tablet by mouth 3 times daily as needed (muscle spasm)     Dispense:  90 tablet     Refill:  1    esomeprazole (NEXIUM) 40 MG delayed release capsule     Sig: Take 1 capsule by mouth daily     Dispense:  90 capsule     Refill:  3       Patient given educational materials - see patient instructions. Discussed use, benefit, and side effects of prescribed medications. All patientquestions answered. Pt voiced understanding. Reviewed health maintenance. Instructedto continue current medications, diet and exercise. Patient agreed with treatmentplan. Follow up as directed.      Electronicallysigned by DK Huerta CNP on 6/22/2023 at 9:19 AM

## 2023-06-23 RX ORDER — ATORVASTATIN CALCIUM 20 MG/1
20 TABLET, FILM COATED ORAL DAILY
Qty: 90 TABLET | Refills: 1 | Status: SHIPPED | OUTPATIENT
Start: 2023-06-23

## 2023-06-24 LAB
EST. AVERAGE GLUCOSE BLD GHB EST-MCNC: 117 MG/DL
HBA1C MFR BLD: 5.7 % (ref 4–6)

## 2023-07-30 ENCOUNTER — APPOINTMENT (OUTPATIENT)
Dept: GENERAL RADIOLOGY | Age: 58
End: 2023-07-30
Payer: COMMERCIAL

## 2023-07-30 ENCOUNTER — HOSPITAL ENCOUNTER (EMERGENCY)
Age: 58
Discharge: HOME | End: 2023-07-30
Attending: EMERGENCY MEDICINE
Payer: COMMERCIAL

## 2023-07-30 VITALS
RESPIRATION RATE: 23 BRPM | HEIGHT: 62 IN | DIASTOLIC BLOOD PRESSURE: 98 MMHG | BODY MASS INDEX: 46.01 KG/M2 | OXYGEN SATURATION: 97 % | TEMPERATURE: 97.9 F | WEIGHT: 250 LBS | HEART RATE: 79 BPM | SYSTOLIC BLOOD PRESSURE: 158 MMHG

## 2023-07-30 DIAGNOSIS — R00.2 PALPITATIONS: Primary | ICD-10-CM

## 2023-07-30 LAB
ANION GAP SERPL CALCULATED.3IONS-SCNC: 12 MMOL/L (ref 9–17)
BASOPHILS # BLD: 0 K/UL (ref 0–0.2)
BASOPHILS NFR BLD: 0 % (ref 0–2)
BNP SERPL-MCNC: 92 PG/ML
BUN SERPL-MCNC: 16 MG/DL (ref 6–20)
CALCIUM SERPL-MCNC: 9.2 MG/DL (ref 8.6–10.4)
CHLORIDE SERPL-SCNC: 106 MMOL/L (ref 98–107)
CO2 SERPL-SCNC: 25 MMOL/L (ref 20–31)
CREAT SERPL-MCNC: 0.9 MG/DL (ref 0.5–0.9)
EOSINOPHIL # BLD: 0.3 K/UL (ref 0–0.4)
EOSINOPHILS RELATIVE PERCENT: 5 % (ref 1–4)
ERYTHROCYTE [DISTWIDTH] IN BLOOD BY AUTOMATED COUNT: 14.7 % (ref 12.5–15.4)
GFR SERPL CREATININE-BSD FRML MDRD: >60 ML/MIN/1.73M2
GLUCOSE SERPL-MCNC: 144 MG/DL (ref 70–99)
HCT VFR BLD AUTO: 40.8 % (ref 36–46)
HGB BLD-MCNC: 13.5 G/DL (ref 12–16)
INR PPP: 0.9
LYMPHOCYTES NFR BLD: 1.2 K/UL (ref 1–4.8)
LYMPHOCYTES RELATIVE PERCENT: 19 % (ref 24–44)
MAGNESIUM SERPL-MCNC: 2.1 MG/DL (ref 1.6–2.6)
MCH RBC QN AUTO: 29.1 PG (ref 26–34)
MCHC RBC AUTO-ENTMCNC: 33.1 G/DL (ref 31–37)
MCV RBC AUTO: 87.9 FL (ref 80–100)
MONOCYTES NFR BLD: 0.3 K/UL (ref 0.1–1.2)
MONOCYTES NFR BLD: 5 % (ref 2–11)
NEUTROPHILS NFR BLD: 71 % (ref 36–66)
NEUTS SEG NFR BLD: 4.6 K/UL (ref 1.8–7.7)
PARTIAL THROMBOPLASTIN TIME: 29.2 SEC (ref 21.3–31.3)
PLATELET # BLD AUTO: 178 K/UL (ref 140–450)
PMV BLD AUTO: 9.2 FL (ref 6–12)
POTASSIUM SERPL-SCNC: 3.7 MMOL/L (ref 3.7–5.3)
PROTHROMBIN TIME: 10.1 SEC (ref 9.4–12.6)
RBC # BLD AUTO: 4.64 M/UL (ref 4–5.2)
SODIUM SERPL-SCNC: 143 MMOL/L (ref 135–144)
TROPONIN I SERPL HS-MCNC: 7 NG/L (ref 0–14)
TROPONIN I SERPL HS-MCNC: 7 NG/L (ref 0–14)
TSH SERPL DL<=0.05 MIU/L-ACNC: 2.27 UIU/ML (ref 0.3–5)
WBC OTHER # BLD: 6.4 K/UL (ref 3.5–11)

## 2023-07-30 PROCEDURE — 85730 THROMBOPLASTIN TIME PARTIAL: CPT

## 2023-07-30 PROCEDURE — 36415 COLL VENOUS BLD VENIPUNCTURE: CPT

## 2023-07-30 PROCEDURE — 84484 ASSAY OF TROPONIN QUANT: CPT

## 2023-07-30 PROCEDURE — 80048 BASIC METABOLIC PNL TOTAL CA: CPT

## 2023-07-30 PROCEDURE — 99285 EMERGENCY DEPT VISIT HI MDM: CPT

## 2023-07-30 PROCEDURE — 84443 ASSAY THYROID STIM HORMONE: CPT

## 2023-07-30 PROCEDURE — 85027 COMPLETE CBC AUTOMATED: CPT

## 2023-07-30 PROCEDURE — 83735 ASSAY OF MAGNESIUM: CPT

## 2023-07-30 PROCEDURE — 85610 PROTHROMBIN TIME: CPT

## 2023-07-30 PROCEDURE — 83880 ASSAY OF NATRIURETIC PEPTIDE: CPT

## 2023-07-30 PROCEDURE — 93005 ELECTROCARDIOGRAM TRACING: CPT | Performed by: EMERGENCY MEDICINE

## 2023-07-30 PROCEDURE — 2580000003 HC RX 258: Performed by: EMERGENCY MEDICINE

## 2023-07-30 PROCEDURE — 6370000000 HC RX 637 (ALT 250 FOR IP): Performed by: EMERGENCY MEDICINE

## 2023-07-30 PROCEDURE — 71046 X-RAY EXAM CHEST 2 VIEWS: CPT

## 2023-07-30 RX ORDER — 0.9 % SODIUM CHLORIDE 0.9 %
1000 INTRAVENOUS SOLUTION INTRAVENOUS ONCE
Status: COMPLETED | OUTPATIENT
Start: 2023-07-30 | End: 2023-07-30

## 2023-07-30 RX ORDER — ACETAMINOPHEN 325 MG/1
650 TABLET ORAL ONCE
Status: COMPLETED | OUTPATIENT
Start: 2023-07-30 | End: 2023-07-30

## 2023-07-30 RX ADMIN — SODIUM CHLORIDE 1000 ML: 9 INJECTION, SOLUTION INTRAVENOUS at 09:07

## 2023-07-30 RX ADMIN — ACETAMINOPHEN 650 MG: 325 TABLET ORAL at 09:09

## 2023-07-30 ASSESSMENT — ENCOUNTER SYMPTOMS
CONSTIPATION: 0
COUGH: 0
SHORTNESS OF BREATH: 0
ABDOMINAL PAIN: 0
EYE REDNESS: 0
VOMITING: 0
CHEST TIGHTNESS: 0
DIARRHEA: 0
NAUSEA: 0

## 2023-07-30 ASSESSMENT — PAIN DESCRIPTION - LOCATION: LOCATION: HEAD

## 2023-07-30 ASSESSMENT — PAIN DESCRIPTION - DESCRIPTORS: DESCRIPTORS: ACHING

## 2023-07-30 ASSESSMENT — PAIN DESCRIPTION - ORIENTATION: ORIENTATION: ANTERIOR

## 2023-07-30 ASSESSMENT — PAIN - FUNCTIONAL ASSESSMENT: PAIN_FUNCTIONAL_ASSESSMENT: 0-10

## 2023-07-30 NOTE — ED NOTES
Pt to be discharged home; pt feeling better and resting well for last hour.  VSS No palpitations     Susan Magana RN  07/30/23 6913
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
## 2023-08-01 LAB
EKG ATRIAL RATE: 78 BPM
EKG P AXIS: 20 DEGREES
EKG P-R INTERVAL: 146 MS
EKG Q-T INTERVAL: 398 MS
EKG QRS DURATION: 102 MS
EKG QTC CALCULATION (BAZETT): 453 MS
EKG R AXIS: 11 DEGREES
EKG T AXIS: 18 DEGREES
EKG VENTRICULAR RATE: 78 BPM

## 2023-10-07 ENCOUNTER — APPOINTMENT (OUTPATIENT)
Dept: CT IMAGING | Age: 58
End: 2023-10-07
Payer: COMMERCIAL

## 2023-10-07 ENCOUNTER — HOSPITAL ENCOUNTER (EMERGENCY)
Age: 58
Discharge: HOME | End: 2023-10-07
Attending: EMERGENCY MEDICINE
Payer: COMMERCIAL

## 2023-10-07 VITALS
DIASTOLIC BLOOD PRESSURE: 85 MMHG | HEIGHT: 63 IN | RESPIRATION RATE: 18 BRPM | TEMPERATURE: 97.5 F | WEIGHT: 253.53 LBS | SYSTOLIC BLOOD PRESSURE: 188 MMHG | OXYGEN SATURATION: 99 % | HEART RATE: 78 BPM | BODY MASS INDEX: 44.92 KG/M2

## 2023-10-07 DIAGNOSIS — R11.0 NAUSEA: ICD-10-CM

## 2023-10-07 DIAGNOSIS — E86.0 DEHYDRATION: Primary | ICD-10-CM

## 2023-10-07 DIAGNOSIS — M62.838 SPASM OF MUSCLE: ICD-10-CM

## 2023-10-07 LAB
ALBUMIN SERPL-MCNC: 4.2 G/DL (ref 3.5–5.2)
ALBUMIN/GLOB SERPL: 1.2 {RATIO} (ref 1–2.5)
ALP SERPL-CCNC: 91 U/L (ref 35–104)
ALT SERPL-CCNC: 43 U/L (ref 5–33)
ANION GAP SERPL CALCULATED.3IONS-SCNC: 8 MMOL/L (ref 9–17)
AST SERPL-CCNC: 53 U/L
BASOPHILS # BLD: 0.1 K/UL (ref 0–0.2)
BASOPHILS NFR BLD: 1 % (ref 0–2)
BILIRUB SERPL-MCNC: 0.5 MG/DL (ref 0.3–1.2)
BUN SERPL-MCNC: 25 MG/DL (ref 6–20)
CALCIUM SERPL-MCNC: 9.6 MG/DL (ref 8.6–10.4)
CHLORIDE SERPL-SCNC: 103 MMOL/L (ref 98–107)
CO2 SERPL-SCNC: 30 MMOL/L (ref 20–31)
CREAT SERPL-MCNC: 1.3 MG/DL (ref 0.5–0.9)
EOSINOPHIL # BLD: 0.4 K/UL (ref 0–0.4)
EOSINOPHILS RELATIVE PERCENT: 5 % (ref 1–4)
ERYTHROCYTE [DISTWIDTH] IN BLOOD BY AUTOMATED COUNT: 15.4 % (ref 12.5–15.4)
GFR SERPL CREATININE-BSD FRML MDRD: 48 ML/MIN/1.73M2
GLUCOSE SERPL-MCNC: 103 MG/DL (ref 70–99)
HCT VFR BLD AUTO: 41.8 % (ref 36–46)
HGB BLD-MCNC: 14 G/DL (ref 12–16)
LYMPHOCYTES NFR BLD: 1.5 K/UL (ref 1–4.8)
LYMPHOCYTES RELATIVE PERCENT: 18 % (ref 24–44)
MCH RBC QN AUTO: 29.1 PG (ref 26–34)
MCHC RBC AUTO-ENTMCNC: 33.6 G/DL (ref 31–37)
MCV RBC AUTO: 86.7 FL (ref 80–100)
MONOCYTES NFR BLD: 0.4 K/UL (ref 0.1–1.2)
MONOCYTES NFR BLD: 5 % (ref 2–11)
NEUTROPHILS NFR BLD: 71 % (ref 36–66)
NEUTS SEG NFR BLD: 5.8 K/UL (ref 1.8–7.7)
PLATELET # BLD AUTO: 195 K/UL (ref 140–450)
PMV BLD AUTO: 9.4 FL (ref 6–12)
POTASSIUM SERPL-SCNC: 4.4 MMOL/L (ref 3.7–5.3)
PROT SERPL-MCNC: 7.8 G/DL (ref 6.4–8.3)
RBC # BLD AUTO: 4.82 M/UL (ref 4–5.2)
SODIUM SERPL-SCNC: 141 MMOL/L (ref 135–144)
TROPONIN I SERPL HS-MCNC: 8 NG/L (ref 0–14)
WBC OTHER # BLD: 8.1 K/UL (ref 3.5–11)

## 2023-10-07 PROCEDURE — 70450 CT HEAD/BRAIN W/O DYE: CPT

## 2023-10-07 PROCEDURE — 6370000000 HC RX 637 (ALT 250 FOR IP)

## 2023-10-07 PROCEDURE — 96361 HYDRATE IV INFUSION ADD-ON: CPT

## 2023-10-07 PROCEDURE — 2580000003 HC RX 258

## 2023-10-07 PROCEDURE — 80053 COMPREHEN METABOLIC PANEL: CPT

## 2023-10-07 PROCEDURE — 74176 CT ABD & PELVIS W/O CONTRAST: CPT

## 2023-10-07 PROCEDURE — 93005 ELECTROCARDIOGRAM TRACING: CPT

## 2023-10-07 PROCEDURE — 36415 COLL VENOUS BLD VENIPUNCTURE: CPT

## 2023-10-07 PROCEDURE — 85025 COMPLETE CBC W/AUTO DIFF WBC: CPT

## 2023-10-07 PROCEDURE — 96360 HYDRATION IV INFUSION INIT: CPT

## 2023-10-07 PROCEDURE — 99284 EMERGENCY DEPT VISIT MOD MDM: CPT

## 2023-10-07 PROCEDURE — 84484 ASSAY OF TROPONIN QUANT: CPT

## 2023-10-07 RX ORDER — CYCLOBENZAPRINE HCL 5 MG
5 TABLET ORAL 2 TIMES DAILY PRN
Qty: 20 TABLET | Refills: 0 | Status: SHIPPED | OUTPATIENT
Start: 2023-10-07 | End: 2023-10-17

## 2023-10-07 RX ORDER — CYCLOBENZAPRINE HCL 10 MG
10 TABLET ORAL ONCE
Status: COMPLETED | OUTPATIENT
Start: 2023-10-07 | End: 2023-10-07

## 2023-10-07 RX ORDER — 0.9 % SODIUM CHLORIDE 0.9 %
500 INTRAVENOUS SOLUTION INTRAVENOUS ONCE
Status: COMPLETED | OUTPATIENT
Start: 2023-10-07 | End: 2023-10-07

## 2023-10-07 RX ORDER — ACETAMINOPHEN 325 MG/1
650 TABLET ORAL ONCE
Status: COMPLETED | OUTPATIENT
Start: 2023-10-07 | End: 2023-10-07

## 2023-10-07 RX ORDER — SODIUM CHLORIDE 9 MG/ML
INJECTION, SOLUTION INTRAVENOUS CONTINUOUS
Status: DISCONTINUED | OUTPATIENT
Start: 2023-10-07 | End: 2023-10-07 | Stop reason: HOSPADM

## 2023-10-07 RX ORDER — ONDANSETRON 4 MG/1
4 TABLET, ORALLY DISINTEGRATING ORAL ONCE
Status: COMPLETED | OUTPATIENT
Start: 2023-10-07 | End: 2023-10-07

## 2023-10-07 RX ORDER — CYCLOBENZAPRINE HCL 10 MG
10 TABLET ORAL 2 TIMES DAILY PRN
Status: DISCONTINUED | OUTPATIENT
Start: 2023-10-07 | End: 2023-10-07 | Stop reason: HOSPADM

## 2023-10-07 RX ADMIN — ONDANSETRON 4 MG: 4 TABLET, ORALLY DISINTEGRATING ORAL at 14:57

## 2023-10-07 RX ADMIN — SODIUM CHLORIDE: 9 INJECTION, SOLUTION INTRAVENOUS at 16:51

## 2023-10-07 RX ADMIN — CYCLOBENZAPRINE HYDROCHLORIDE 10 MG: 10 TABLET, FILM COATED ORAL at 15:11

## 2023-10-07 RX ADMIN — ACETAMINOPHEN 650 MG: 325 TABLET ORAL at 14:56

## 2023-10-07 RX ADMIN — SODIUM CHLORIDE 500 ML: 9 INJECTION, SOLUTION INTRAVENOUS at 14:57

## 2023-10-07 ASSESSMENT — PAIN SCALES - GENERAL: PAINLEVEL_OUTOF10: 8

## 2023-10-07 ASSESSMENT — PAIN - FUNCTIONAL ASSESSMENT: PAIN_FUNCTIONAL_ASSESSMENT: 0-10

## 2023-10-07 ASSESSMENT — PAIN DESCRIPTION - DESCRIPTORS: DESCRIPTORS: SPASM

## 2023-10-07 ASSESSMENT — PAIN DESCRIPTION - LOCATION: LOCATION: BACK

## 2023-10-07 NOTE — ED NOTES
[facility]      Pt Name: Nica Huynh  MRN: [id number]  [address] 1965  Date of evaluation: 10/7/2023  Provider: Jameel Coffman DO    CHIEF COMPLAINT       Chief Complaint   Patient presents with    Back Pain     Back pain, dizziness, nausea. Symptoms started today at work around 1000 this morning. Pt describes back pain as spasm pain. FINAL IMPRESSION        1. Dehydration    2. Nausea    3. Spasm of muscle        DISPOSITION/PLAN   DISPOSITION  Home    - stay hydrated    - take Flexeril for back spasms    - follow up with PCP within next two weeks    - Consider [facility] outpatient for your back pain and muscle spasm              HISTORY OF PRESENT ILLNESS   (Location/Symptom, Timing/Onset, Context/Setting, Quality, Duration, Modifying Factors, Severity)  Note limiting factors. Nica Huynh is a 66 y.o. female who presents to the emergency department nausea. Patient has a history of WPS s/p cardiac ablassion, hypertension,  degenerative joint disease with back spasms, chronic nausea controled on Nexium. However, patient woke up this morning with no appetite, significantly worse nausea and back spasms. Patient attempted to eat an omelet and was unable to eat it without having unbearable nausea, prompting her to go to the ER. She reports being unable to eat since yesterday morning. She denied a history of  diabetes, or recent illness. She admitted to changes in vision this morning, \"like looking through someone else's glasses\"  and shortness of breath, but denies chest pain, numbness, tingling, swelling, chills, or recent illness. Nursing Notes were reviewed. REVIEW OF SYSTEMS    (2-9 systems for level 4, 10 or more for level 5)     Review of Systems   All other systems reviewed and are negative.       Except as noted above the remainder of the review of systems was reviewed and negative.        PAST MEDICAL HISTORY     Past Medical History:   Diagnosis Date    Anxiety     Bundle branch block     Chronic back pain     Depression     History of radiofrequency ablation (RFA) procedure for cardiac arrhythmia     for WPW    Hyperlipidemia     Hypertension     Mass of adrenal gland ([address])     Obesity     Solitary left kidney     Transient gluten sensitivity 02/21/2019    WPW (Emiliana-Parkinson-White syndrome) 2009         SURGICAL HISTORY       Past Surgical History:   Procedure Laterality Date    ABLATION OF DYSRHYTHMIC FOCUS      x 2-3 due to WPW    APPENDECTOMY      ARM SURGERY      COLONOSCOPY N/A 2/21/2019    COLONOSCOPY POLYPECTOMY SNARE/COLD BIOPSY performed by [place] MD at  [address] N/A 2/21/2019    EGD BIOPSY performed by [place] MD at [address]       Previous Medications    ATORVASTATIN (LIPITOR) 20 MG TABLET    Take 1 tablet by mouth daily    DOCUSATE SODIUM (COLACE) 100 MG CAPSULE    Take 1 capsule by mouth 2 times daily    ESOMEPRAZOLE (NEXIUM) 40 MG DELAYED RELEASE CAPSULE    Take 1 capsule by mouth daily    METHOCARBAMOL (ROBAXIN-750) 750 MG TABLET    Take 1 tablet by mouth 3 times daily as needed (muscle spasm)    METOPROLOL TARTRATE (LOPRESSOR) 50 MG TABLET    Take 1 tablet by mouth 2 times daily    ONDANSETRON (ZOFRAN) 4 MG TABLET    Take 1 tablet by mouth every 8 hours as needed for Nausea or Vomiting       ALLERGIES     Gluten meal and Pcn [penicillins]    FAMILY HISTORY       Family History   Problem Relation Age of Onset    Stroke Mother     Cancer Mother         bladder    Diabetes Father     Diabetes Sister     Ovarian Cancer Sister           SOCIAL HISTORY       Social History     Socioeconomic History    Marital status: Single   Tobacco Use    Smoking status: Every Day     Packs/day: 0.50     Years: 10.00     Additional pack years: 0.00     Total pack years: 5.00     Types: Cigarettes

## 2023-10-07 NOTE — ED PROVIDER NOTES
pelvis. At 4 PM supervisory care will be transferred to Dr. Radha Velasquez:    None    PROCEDURES:    See midlevel documentation      OARRS Report if indicated           (Please note that portions of this note were completed with a voice recognition program.  Efforts were made to edit the dictations but occasionally words are mis-transcribed. Additionally, portions of this note may also include information that was incorporated after care transfer to another provider that were not available at the time of my evaluation.   Some of this information could likely include laboratory values, vital sign updates, medications etc.)    Landy Busch,    Attending Emergency Physician       Landy Busch DO  10/07/23 1530

## 2023-10-07 NOTE — ED PROVIDER NOTES
ATTENDING  ADDENDUM     Care of this patient was assumed from Dr. Matt Noriega. The patient was seen for Back Pain (Back pain, dizziness, nausea. Symptoms started today at work around 1000 this morning. Pt describes back pain as spasm pain. )  . The patient's initial evaluation and plan have been discussed with the prior provider who initially evaluated the patient. Nursing Notes, Past Medical Hx, Past Surgical Hx, Social Hx, Allergies, and Family Hx were all reviewed. DIAGNOSTIC RESULTS     EKG:  None    RADIOLOGY:   CT Head WO Contrast    Result Date: 10/7/2023  EXAMINATION: CT OF THE HEAD WITHOUT CONTRAST  10/7/2023 12:20 pm TECHNIQUE: CT of the head was performed without the administration of intravenous contrast. Automated exposure control, iterative reconstruction, and/or weight based adjustment of the mA/kV was utilized to reduce the radiation dose to as low as reasonably achievable. COMPARISON: 09/28/2021 HISTORY: ORDERING SYSTEM PROVIDED HISTORY: change in vision TECHNOLOGIST PROVIDED HISTORY: change in vision Decision Support Exception - unselect if not a suspected or confirmed emergency medical condition->Emergency Medical Condition (MA) Reason for Exam: Back pain, change in vision. FINDINGS: BRAIN/VENTRICLES: There is no acute intracranial hemorrhage, mass effect or midline shift. No abnormal extra-axial fluid collection. The gray-white differentiation is maintained without evidence of an acute infarct. There is no evidence of hydrocephalus. ORBITS: The visualized portion of the orbits demonstrate no acute abnormality. SINUSES: Mild mucoperiosteal thickening of the inferior left maxillary sinus. The remaining visualized paranasal sinuses and mastoid air cells demonstrate no acute abnormality. SOFT TISSUES/SKULL:  No acute abnormality of the visualized skull or soft tissues. No acute intracranial abnormality.      CT ABDOMEN PELVIS WO CONTRAST Additional Contrast? None    Result Date: 10/7/2023  EXAMINATION: CT OF THE ABDOMEN AND PELVIS WITHOUT CONTRAST 10/7/2023 3:21 pm TECHNIQUE: CT of the abdomen and pelvis was performed without the administration of intravenous contrast. Multiplanar reformatted images are provided for review. Automated exposure control, iterative reconstruction, and/or weight based adjustment of the mA/kV was utilized to reduce the radiation dose to as low as reasonably achievable. COMPARISON: 01/16/2023 HISTORY: ORDERING SYSTEM PROVIDED HISTORY: abdominal pain TECHNOLOGIST PROVIDED HISTORY: abdominal pain Decision Support Exception - unselect if not a suspected or confirmed emergency medical condition->Emergency Medical Condition (MA) Reason for Exam: Pt doesn't have a rt kidney from birth, also left kidney has a mass on it along with back spasms and abd  pain FINDINGS: Lower Chest: The visualized heart and lungs show no acute abnormalities. Organs: The unenhanced liver, spleen, pancreas and gallbladder show no significant abnormalities. 3 cm lipid rich left adrenal adenoma is again demonstrated. Left kidney shows no nephrolithiasis or hydronephrosis. Rudimentary right kidney unchanged. GI/Bowel: There is limited evaluation due to absence of oral contrast. The stomach shows no focal lesions. Small bowel loops normal in caliber showing no focal abnormalities. No evidence for appendicitis. Evaluation of the colon shows no acute process. Pelvis: Urinary bladder shows no calculi. Uterus present. Peritoneum/Retroperitoneum: No free fluid or significant lymphadenopathy. Bones/Soft Tissues: No acute abnormality of the bones. The superficial soft tissues show no acute process. 1. No acute infective or inflammatory process. 2. No bowel obstruction. 3. Stable findings including left adrenal adenoma and a very rudimentary right kidney.          LABS:  Results for orders placed or performed during the hospital encounter of 10/07/23   Comprehensive Metabolic Panel w/ Reflex to

## 2023-10-09 LAB
EKG ATRIAL RATE: 89 BPM
EKG P AXIS: 88 DEGREES
EKG P-R INTERVAL: 148 MS
EKG Q-T INTERVAL: 404 MS
EKG QRS DURATION: 122 MS
EKG QTC CALCULATION (BAZETT): 491 MS
EKG R AXIS: 78 DEGREES
EKG T AXIS: 55 DEGREES
EKG VENTRICULAR RATE: 89 BPM

## 2023-10-18 ENCOUNTER — OFFICE VISIT (OUTPATIENT)
Dept: PRIMARY CARE CLINIC | Age: 58
End: 2023-10-18
Payer: COMMERCIAL

## 2023-10-18 VITALS
SYSTOLIC BLOOD PRESSURE: 124 MMHG | BODY MASS INDEX: 44.01 KG/M2 | HEART RATE: 69 BPM | RESPIRATION RATE: 14 BRPM | HEIGHT: 63 IN | WEIGHT: 248.4 LBS | OXYGEN SATURATION: 96 % | DIASTOLIC BLOOD PRESSURE: 72 MMHG

## 2023-10-18 DIAGNOSIS — R06.02 SOB (SHORTNESS OF BREATH): ICD-10-CM

## 2023-10-18 DIAGNOSIS — R23.9 SKIN CHANGE: ICD-10-CM

## 2023-10-18 DIAGNOSIS — R53.83 OTHER FATIGUE: Primary | ICD-10-CM

## 2023-10-18 DIAGNOSIS — Z72.0 TOBACCO USE: ICD-10-CM

## 2023-10-18 DIAGNOSIS — G89.29 CHRONIC MIDLINE LOW BACK PAIN WITH BILATERAL SCIATICA: ICD-10-CM

## 2023-10-18 DIAGNOSIS — M54.41 CHRONIC MIDLINE LOW BACK PAIN WITH BILATERAL SCIATICA: ICD-10-CM

## 2023-10-18 DIAGNOSIS — E86.0 DEHYDRATION: ICD-10-CM

## 2023-10-18 DIAGNOSIS — M54.42 CHRONIC MIDLINE LOW BACK PAIN WITH BILATERAL SCIATICA: ICD-10-CM

## 2023-10-18 PROCEDURE — 3078F DIAST BP <80 MM HG: CPT | Performed by: NURSE PRACTITIONER

## 2023-10-18 PROCEDURE — 3074F SYST BP LT 130 MM HG: CPT | Performed by: NURSE PRACTITIONER

## 2023-10-18 PROCEDURE — 99214 OFFICE O/P EST MOD 30 MIN: CPT | Performed by: NURSE PRACTITIONER

## 2023-10-18 RX ORDER — BUPROPION HYDROCHLORIDE 150 MG/1
150 TABLET, EXTENDED RELEASE ORAL 2 TIMES DAILY
Qty: 180 TABLET | Refills: 1 | Status: SHIPPED | OUTPATIENT
Start: 2023-10-18

## 2023-10-18 RX ORDER — PREDNISONE 20 MG/1
TABLET ORAL
Qty: 18 TABLET | Refills: 0 | Status: SHIPPED | OUTPATIENT
Start: 2023-10-18 | End: 2023-10-28

## 2023-10-18 RX ORDER — TRIAMCINOLONE ACETONIDE 0.25 MG/G
CREAM TOPICAL
Qty: 15 G | Refills: 1 | Status: SHIPPED | OUTPATIENT
Start: 2023-10-18

## 2023-10-18 RX ORDER — GABAPENTIN 300 MG/1
300 CAPSULE ORAL NIGHTLY
Qty: 30 CAPSULE | Refills: 5 | Status: SHIPPED | OUTPATIENT
Start: 2023-10-18 | End: 2024-04-15

## 2023-10-18 SDOH — ECONOMIC STABILITY: FOOD INSECURITY: WITHIN THE PAST 12 MONTHS, YOU WORRIED THAT YOUR FOOD WOULD RUN OUT BEFORE YOU GOT MONEY TO BUY MORE.: NEVER TRUE

## 2023-10-18 SDOH — ECONOMIC STABILITY: INCOME INSECURITY: HOW HARD IS IT FOR YOU TO PAY FOR THE VERY BASICS LIKE FOOD, HOUSING, MEDICAL CARE, AND HEATING?: NOT HARD AT ALL

## 2023-10-18 SDOH — ECONOMIC STABILITY: FOOD INSECURITY: WITHIN THE PAST 12 MONTHS, THE FOOD YOU BOUGHT JUST DIDN'T LAST AND YOU DIDN'T HAVE MONEY TO GET MORE.: NEVER TRUE

## 2023-10-18 ASSESSMENT — COLUMBIA-SUICIDE SEVERITY RATING SCALE - C-SSRS
4. HAVE YOU HAD THESE THOUGHTS AND HAD SOME INTENTION OF ACTING ON THEM?: NO
7. DID THIS OCCUR IN THE LAST THREE MONTHS: NO
5. HAVE YOU STARTED TO WORK OUT OR WORKED OUT THE DETAILS OF HOW TO KILL YOURSELF? DO YOU INTEND TO CARRY OUT THIS PLAN?: NO
3. HAVE YOU BEEN THINKING ABOUT HOW YOU MIGHT KILL YOURSELF?: NO

## 2023-10-18 ASSESSMENT — PATIENT HEALTH QUESTIONNAIRE - PHQ9
SUM OF ALL RESPONSES TO PHQ9 QUESTIONS 1 & 2: 0
9. THOUGHTS THAT YOU WOULD BE BETTER OFF DEAD, OR OF HURTING YOURSELF: 0
3. TROUBLE FALLING OR STAYING ASLEEP: 0
2. FEELING DOWN, DEPRESSED OR HOPELESS: 0
6. FEELING BAD ABOUT YOURSELF - OR THAT YOU ARE A FAILURE OR HAVE LET YOURSELF OR YOUR FAMILY DOWN: 0
SUM OF ALL RESPONSES TO PHQ QUESTIONS 1-9: 0
1. LITTLE INTEREST OR PLEASURE IN DOING THINGS: 0
7. TROUBLE CONCENTRATING ON THINGS, SUCH AS READING THE NEWSPAPER OR WATCHING TELEVISION: 0
SUM OF ALL RESPONSES TO PHQ QUESTIONS 1-9: 0
4. FEELING TIRED OR HAVING LITTLE ENERGY: 0
8. MOVING OR SPEAKING SO SLOWLY THAT OTHER PEOPLE COULD HAVE NOTICED. OR THE OPPOSITE, BEING SO FIGETY OR RESTLESS THAT YOU HAVE BEEN MOVING AROUND A LOT MORE THAN USUAL: 0
5. POOR APPETITE OR OVEREATING: 0

## 2023-10-18 ASSESSMENT — ENCOUNTER SYMPTOMS
ABDOMINAL PAIN: 0
COUGH: 0
BACK PAIN: 0
SHORTNESS OF BREATH: 1

## 2023-10-18 NOTE — PROGRESS NOTES
10/07/2023 25 (H)     BP Readings from Last 3 Encounters:   10/18/23 124/72   10/07/23 (!) 188/85   07/30/23 (!) 158/98          ([id number])    Past Medical History:   Diagnosis Date    Anxiety     Bundle branch block     Chronic back pain     Depression     History of radiofrequency ablation (RFA) procedure for cardiac arrhythmia     for WPW    Hyperlipidemia     Hypertension     Mass of adrenal gland ([address])     Obesity     Solitary left kidney     Transient gluten sensitivity 02/21/2019    WPW (Emiliana-Parkinson-White syndrome) 2009      Past Surgical History:   Procedure Laterality Date    ABLATION OF DYSRHYTHMIC FOCUS      x 2-3 due to [address]  2009 & 2010    Heart ablasion (wpw)    COLONOSCOPY N/A 02/21/2019    COLONOSCOPY POLYPECTOMY SNARE/COLD BIOPSY performed by Sanjay Mcmanus MD at  [address] N/A 02/21/2019    EGD BIOPSY performed by Sanjay Mcmanus MD at [address] [facility] History   Problem Relation Age of Onset    Stroke Mother     Cancer Mother         bladder    Diabetes Father     Alcohol Abuse Father     Diabetes Sister     Ovarian Cancer Sister     Alcohol Abuse Sister     Arthritis Paternal Grandmother     Diabetes Sister           Social History     Tobacco Use    Smoking status: Every Day     Packs/day: 0.50     Years: 10.00     Additional pack years: 0.00     Total pack years: 5.00     Types: Cigarettes    Smokeless tobacco: Never    Tobacco comments:     decreasing with wellbutrin, currently about 3 cigarettes per day   Substance Use Topics    Alcohol use: No      Current Outpatient Medications   Medication Sig Dispense Refill    predniSONE (DELTASONE) 20 MG tablet 3 tabs x 3 days, then 2 tabs x 3 days, then 1 tab x 3 days 18 tablet 0    gabapentin (NEURONTIN) 300 MG capsule Take 1 capsule by mouth nightly for 180 days.  Intended supply: 30 days 30 capsule 5    buPROPion (WELLBUTRIN SR) 150 MG

## 2024-01-15 ENCOUNTER — OFFICE VISIT (OUTPATIENT)
Dept: PRIMARY CARE CLINIC | Age: 59
End: 2024-01-15
Payer: COMMERCIAL

## 2024-01-15 VITALS
HEIGHT: 62 IN | BODY MASS INDEX: 46.3 KG/M2 | WEIGHT: 251.6 LBS | SYSTOLIC BLOOD PRESSURE: 118 MMHG | DIASTOLIC BLOOD PRESSURE: 78 MMHG | OXYGEN SATURATION: 96 % | RESPIRATION RATE: 15 BRPM | HEART RATE: 81 BPM

## 2024-01-15 DIAGNOSIS — M54.42 ACUTE MIDLINE LOW BACK PAIN WITH LEFT-SIDED SCIATICA: Primary | ICD-10-CM

## 2024-01-15 PROCEDURE — 3074F SYST BP LT 130 MM HG: CPT | Performed by: NURSE PRACTITIONER

## 2024-01-15 PROCEDURE — 99214 OFFICE O/P EST MOD 30 MIN: CPT | Performed by: NURSE PRACTITIONER

## 2024-01-15 PROCEDURE — 96372 THER/PROPH/DIAG INJ SC/IM: CPT | Performed by: NURSE PRACTITIONER

## 2024-01-15 PROCEDURE — 3078F DIAST BP <80 MM HG: CPT | Performed by: NURSE PRACTITIONER

## 2024-01-15 RX ORDER — METHYLPREDNISOLONE ACETATE 80 MG/ML
80 INJECTION, SUSPENSION INTRA-ARTICULAR; INTRALESIONAL; INTRAMUSCULAR; SOFT TISSUE ONCE
Status: COMPLETED | OUTPATIENT
Start: 2024-01-15 | End: 2024-01-15

## 2024-01-15 RX ORDER — GABAPENTIN 300 MG/1
300 CAPSULE ORAL NIGHTLY
Qty: 30 CAPSULE | Refills: 5 | Status: SHIPPED | OUTPATIENT
Start: 2024-01-15 | End: 2024-07-13

## 2024-01-15 RX ORDER — KETOROLAC TROMETHAMINE 30 MG/ML
60 INJECTION, SOLUTION INTRAMUSCULAR; INTRAVENOUS ONCE
Status: COMPLETED | OUTPATIENT
Start: 2024-01-15 | End: 2024-01-15

## 2024-01-15 RX ORDER — PREDNISONE 20 MG/1
TABLET ORAL
Qty: 18 TABLET | Refills: 0 | Status: SHIPPED | OUTPATIENT
Start: 2024-01-15 | End: 2024-01-25

## 2024-01-15 RX ADMIN — KETOROLAC TROMETHAMINE 60 MG: 30 INJECTION, SOLUTION INTRAMUSCULAR; INTRAVENOUS at 12:16

## 2024-01-15 RX ADMIN — METHYLPREDNISOLONE ACETATE 80 MG: 80 INJECTION, SUSPENSION INTRA-ARTICULAR; INTRALESIONAL; INTRAMUSCULAR; SOFT TISSUE at 12:17

## 2024-01-15 ASSESSMENT — PATIENT HEALTH QUESTIONNAIRE - PHQ9
SUM OF ALL RESPONSES TO PHQ QUESTIONS 1-9: 0
5. POOR APPETITE OR OVEREATING: 0
9. THOUGHTS THAT YOU WOULD BE BETTER OFF DEAD, OR OF HURTING YOURSELF: 0
8. MOVING OR SPEAKING SO SLOWLY THAT OTHER PEOPLE COULD HAVE NOTICED. OR THE OPPOSITE, BEING SO FIGETY OR RESTLESS THAT YOU HAVE BEEN MOVING AROUND A LOT MORE THAN USUAL: 0
10. IF YOU CHECKED OFF ANY PROBLEMS, HOW DIFFICULT HAVE THESE PROBLEMS MADE IT FOR YOU TO DO YOUR WORK, TAKE CARE OF THINGS AT HOME, OR GET ALONG WITH OTHER PEOPLE: 0
1. LITTLE INTEREST OR PLEASURE IN DOING THINGS: 0
SUM OF ALL RESPONSES TO PHQ9 QUESTIONS 1 & 2: 0
4. FEELING TIRED OR HAVING LITTLE ENERGY: 0
SUM OF ALL RESPONSES TO PHQ QUESTIONS 1-9: 0
2. FEELING DOWN, DEPRESSED OR HOPELESS: 0
7. TROUBLE CONCENTRATING ON THINGS, SUCH AS READING THE NEWSPAPER OR WATCHING TELEVISION: 0
3. TROUBLE FALLING OR STAYING ASLEEP: 0
6. FEELING BAD ABOUT YOURSELF - OR THAT YOU ARE A FAILURE OR HAVE LET YOURSELF OR YOUR FAMILY DOWN: 0

## 2024-01-15 ASSESSMENT — ENCOUNTER SYMPTOMS
SHORTNESS OF BREATH: 0
COUGH: 0
ABDOMINAL PAIN: 0
BACK PAIN: 1

## 2024-01-15 NOTE — PROGRESS NOTES
[facility]  [facility] PRIMARY CARE  [address] [place] [address]  [address]  [facility] [address]  Dept: [phone]  Dept Fax: [phone]    Lorie Sharp is a 58 y.o. female who presentstoday for her medical conditions/complaints as noted below.  Lorie Sharp is c/o of  Chief Complaint   Patient presents with    Back Pain    Tingling     Lower back legs x 2-3 days            HPI:     Presents for acute concern with sister  BP well controlled  Weight is stable    C/o worsening of lower back pain, radiates to left groin and down left leg  Numbness and tingling  Denies any mechanism of injury  Pain began at work, but not a work related incident  Willing to start PT- has been referred in the past  Will need work note  Unable to sit during exam d/t discomfort    Denies any other problems/concerns        Hemoglobin A1C (%)   Date Value   06/22/2023 5.7   10/31/2019 5.3   10/18/2017 5.4             ( goal A1C is < 7)   No components found for: \"LABMICR\"  LDL Cholesterol (mg/dL)   Date Value   06/22/2023 122   03/06/2021 06/20/2019 206 (H)       (goal LDL is <100)   AST (U/L)   Date Value   10/07/2023 53 (H)     ALT (U/L)   Date Value   10/07/2023 43 (H)     BUN (mg/dL)   Date Value   10/07/2023 25 (H)     BP Readings from Last 3 Encounters:   01/15/24 118/78   10/18/23 124/72   10/07/23 (!) 188/85          ([id number]/80)    Past Medical History:   Diagnosis Date    Anxiety     Bundle branch block     Chronic back pain     Depression     History of radiofrequency ablation (RFA) procedure for cardiac arrhythmia     for WPW    Hyperlipidemia     Hypertension     Mass of adrenal gland (HCC)     Obesity     Solitary left kidney     Transient gluten sensitivity 02/21/2019    WPW (Emiliana-Parkinson-White syndrome) 2009      Past Surgical History:   Procedure Laterality Date    ABLATION OF DYSRHYTHMIC FOCUS      x 2-3 due to WPW    APPENDECTOMY      ARM SURGERY      CARDIAC SURGERY  2009 & 2010

## 2024-04-29 ENCOUNTER — HOSPITAL ENCOUNTER (OUTPATIENT)
Age: 59
Discharge: HOME | End: 2024-05-01
Payer: COMMERCIAL

## 2024-04-29 ENCOUNTER — OFFICE VISIT (OUTPATIENT)
Dept: PRIMARY CARE CLINIC | Age: 59
End: 2024-04-29
Payer: COMMERCIAL

## 2024-04-29 ENCOUNTER — HOSPITAL ENCOUNTER (OUTPATIENT)
Age: 59
Discharge: HOME | End: 2024-04-29
Payer: COMMERCIAL

## 2024-04-29 ENCOUNTER — HOSPITAL ENCOUNTER (OUTPATIENT)
Dept: GENERAL RADIOLOGY | Age: 59
Discharge: HOME | End: 2024-05-01
Payer: COMMERCIAL

## 2024-04-29 VITALS
HEIGHT: 63 IN | SYSTOLIC BLOOD PRESSURE: 168 MMHG | OXYGEN SATURATION: 97 % | BODY MASS INDEX: 45.54 KG/M2 | DIASTOLIC BLOOD PRESSURE: 98 MMHG | WEIGHT: 257 LBS | HEART RATE: 85 BPM | RESPIRATION RATE: 16 BRPM

## 2024-04-29 DIAGNOSIS — Z13.29 SCREENING FOR THYROID DISORDER: ICD-10-CM

## 2024-04-29 DIAGNOSIS — M79.641 RIGHT HAND PAIN: ICD-10-CM

## 2024-04-29 DIAGNOSIS — Z13.1 SCREENING FOR DIABETES MELLITUS: ICD-10-CM

## 2024-04-29 DIAGNOSIS — Z13.220 SCREENING FOR LIPID DISORDERS: ICD-10-CM

## 2024-04-29 DIAGNOSIS — M54.42 ACUTE MIDLINE LOW BACK PAIN WITH LEFT-SIDED SCIATICA: Primary | ICD-10-CM

## 2024-04-29 DIAGNOSIS — Z13.0 SCREENING FOR DEFICIENCY ANEMIA: ICD-10-CM

## 2024-04-29 LAB
ALBUMIN SERPL-MCNC: 3.8 G/DL (ref 3.5–5.2)
ALBUMIN/GLOB SERPL: 1 {RATIO} (ref 1–2.5)
ALP SERPL-CCNC: 74 U/L (ref 35–104)
ALT SERPL-CCNC: 31 U/L (ref 10–35)
ANION GAP SERPL CALCULATED.3IONS-SCNC: 12 MMOL/L (ref 9–16)
AST SERPL-CCNC: 34 U/L (ref 10–35)
BILIRUB SERPL-MCNC: 0.2 MG/DL (ref 0–1.2)
BUN SERPL-MCNC: 14 MG/DL (ref 6–20)
CALCIUM SERPL-MCNC: 9.1 MG/DL (ref 8.6–10.4)
CHLORIDE SERPL-SCNC: 107 MMOL/L (ref 98–107)
CHOLEST SERPL-MCNC: 216 MG/DL (ref 0–199)
CHOLESTEROL/HDL RATIO: 5
CO2 SERPL-SCNC: 24 MMOL/L (ref 20–31)
CREAT SERPL-MCNC: 0.9 MG/DL (ref 0.5–0.9)
ERYTHROCYTE [DISTWIDTH] IN BLOOD BY AUTOMATED COUNT: 14.6 % (ref 11.8–14.4)
EST. AVERAGE GLUCOSE BLD GHB EST-MCNC: 114 MG/DL
GFR SERPL CREATININE-BSD FRML MDRD: 77 ML/MIN/1.73M2
GLUCOSE SERPL-MCNC: 76 MG/DL (ref 74–99)
HBA1C MFR BLD: 5.6 % (ref 4–6)
HCT VFR BLD AUTO: 40.4 % (ref 36.3–47.1)
HDLC SERPL-MCNC: 46 MG/DL
HGB BLD-MCNC: 12.4 G/DL (ref 11.9–15.1)
LDLC SERPL CALC-MCNC: 141 MG/DL (ref 0–100)
MCH RBC QN AUTO: 27.9 PG (ref 25.2–33.5)
MCHC RBC AUTO-ENTMCNC: 30.7 G/DL (ref 28.4–34.8)
MCV RBC AUTO: 90.8 FL (ref 82.6–102.9)
NRBC BLD-RTO: 0 PER 100 WBC
PLATELET # BLD AUTO: 201 K/UL (ref 138–453)
PMV BLD AUTO: 11.6 FL (ref 8.1–13.5)
POTASSIUM SERPL-SCNC: 3.9 MMOL/L (ref 3.7–5.3)
PROT SERPL-MCNC: 7 G/DL (ref 6.6–8.7)
RBC # BLD AUTO: 4.45 M/UL (ref 3.95–5.11)
SODIUM SERPL-SCNC: 143 MMOL/L (ref 136–145)
TRIGL SERPL-MCNC: 145 MG/DL
TSH SERPL DL<=0.05 MIU/L-ACNC: 3.41 UIU/ML (ref 0.27–4.2)
VLDLC SERPL CALC-MCNC: 29 MG/DL
WBC OTHER # BLD: 7.6 K/UL (ref 3.5–11.3)

## 2024-04-29 PROCEDURE — 3080F DIAST BP >= 90 MM HG: CPT | Performed by: NURSE PRACTITIONER

## 2024-04-29 PROCEDURE — 80061 LIPID PANEL: CPT

## 2024-04-29 PROCEDURE — 80053 COMPREHEN METABOLIC PANEL: CPT

## 2024-04-29 PROCEDURE — 3077F SYST BP >= 140 MM HG: CPT | Performed by: NURSE PRACTITIONER

## 2024-04-29 PROCEDURE — 36415 COLL VENOUS BLD VENIPUNCTURE: CPT

## 2024-04-29 PROCEDURE — 73120 X-RAY EXAM OF HAND: CPT

## 2024-04-29 PROCEDURE — 99214 OFFICE O/P EST MOD 30 MIN: CPT | Performed by: NURSE PRACTITIONER

## 2024-04-29 PROCEDURE — 83036 HEMOGLOBIN GLYCOSYLATED A1C: CPT

## 2024-04-29 PROCEDURE — 84443 ASSAY THYROID STIM HORMONE: CPT

## 2024-04-29 PROCEDURE — 85027 COMPLETE CBC AUTOMATED: CPT

## 2024-04-29 RX ORDER — ATORVASTATIN CALCIUM 20 MG/1
20 TABLET, FILM COATED ORAL DAILY
Qty: 90 TABLET | Refills: 1 | Status: SHIPPED | OUTPATIENT
Start: 2024-04-29

## 2024-04-29 RX ORDER — METOPROLOL TARTRATE 50 MG/1
50 TABLET, FILM COATED ORAL 2 TIMES DAILY
Qty: 180 TABLET | Refills: 1 | Status: SHIPPED | OUTPATIENT
Start: 2024-04-29

## 2024-04-29 RX ORDER — NYSTATIN 100000 [USP'U]/G
POWDER TOPICAL 3 TIMES DAILY
Qty: 60 G | Refills: 1 | Status: SHIPPED | OUTPATIENT
Start: 2024-04-29

## 2024-04-29 RX ORDER — BUPROPION HYDROCHLORIDE 150 MG/1
150 TABLET, EXTENDED RELEASE ORAL 2 TIMES DAILY
Qty: 180 TABLET | Refills: 1 | Status: SHIPPED | OUTPATIENT
Start: 2024-04-29

## 2024-04-29 RX ORDER — SULFAMETHOXAZOLE AND TRIMETHOPRIM 800; 160 MG/1; MG/1
1 TABLET ORAL 2 TIMES DAILY
Qty: 14 TABLET | Refills: 0 | Status: SHIPPED | OUTPATIENT
Start: 2024-04-29 | End: 2024-05-06

## 2024-04-29 SDOH — ECONOMIC STABILITY: FOOD INSECURITY: WITHIN THE PAST 12 MONTHS, THE FOOD YOU BOUGHT JUST DIDN'T LAST AND YOU DIDN'T HAVE MONEY TO GET MORE.: NEVER TRUE

## 2024-04-29 SDOH — ECONOMIC STABILITY: FOOD INSECURITY: WITHIN THE PAST 12 MONTHS, YOU WORRIED THAT YOUR FOOD WOULD RUN OUT BEFORE YOU GOT MONEY TO BUY MORE.: NEVER TRUE

## 2024-04-29 SDOH — ECONOMIC STABILITY: INCOME INSECURITY: HOW HARD IS IT FOR YOU TO PAY FOR THE VERY BASICS LIKE FOOD, HOUSING, MEDICAL CARE, AND HEATING?: NOT HARD AT ALL

## 2024-04-29 ASSESSMENT — PATIENT HEALTH QUESTIONNAIRE - PHQ9
4. FEELING TIRED OR HAVING LITTLE ENERGY: NOT AT ALL
7. TROUBLE CONCENTRATING ON THINGS, SUCH AS READING THE NEWSPAPER OR WATCHING TELEVISION: NOT AT ALL
6. FEELING BAD ABOUT YOURSELF - OR THAT YOU ARE A FAILURE OR HAVE LET YOURSELF OR YOUR FAMILY DOWN: NOT AT ALL
SUM OF ALL RESPONSES TO PHQ9 QUESTIONS 1 & 2: 0
SUM OF ALL RESPONSES TO PHQ QUESTIONS 1-9: 0
9. THOUGHTS THAT YOU WOULD BE BETTER OFF DEAD, OR OF HURTING YOURSELF: NOT AT ALL
SUM OF ALL RESPONSES TO PHQ QUESTIONS 1-9: 0
1. LITTLE INTEREST OR PLEASURE IN DOING THINGS: NOT AT ALL
8. MOVING OR SPEAKING SO SLOWLY THAT OTHER PEOPLE COULD HAVE NOTICED. OR THE OPPOSITE, BEING SO FIGETY OR RESTLESS THAT YOU HAVE BEEN MOVING AROUND A LOT MORE THAN USUAL: NOT AT ALL
2. FEELING DOWN, DEPRESSED OR HOPELESS: NOT AT ALL
3. TROUBLE FALLING OR STAYING ASLEEP: NOT AT ALL
10. IF YOU CHECKED OFF ANY PROBLEMS, HOW DIFFICULT HAVE THESE PROBLEMS MADE IT FOR YOU TO DO YOUR WORK, TAKE CARE OF THINGS AT HOME, OR GET ALONG WITH OTHER PEOPLE: NOT DIFFICULT AT ALL
SUM OF ALL RESPONSES TO PHQ QUESTIONS 1-9: 0
5. POOR APPETITE OR OVEREATING: NOT AT ALL
SUM OF ALL RESPONSES TO PHQ QUESTIONS 1-9: 0

## 2024-04-29 ASSESSMENT — ENCOUNTER SYMPTOMS
SHORTNESS OF BREATH: 0
BACK PAIN: 1
ABDOMINAL PAIN: 0
COUGH: 0

## 2024-04-29 NOTE — PROGRESS NOTES
reactive to light.   Cardiovascular:      Rate and Rhythm: Normal rate and regular rhythm.      Heart sounds: Normal heart sounds.   Pulmonary:      Effort: Pulmonary effort is normal.      Breath sounds: Normal breath sounds.   Abdominal:      General: Bowel sounds are normal.      Palpations: Abdomen is soft.      Tenderness: There is no abdominal tenderness.   Musculoskeletal:         General: Normal range of motion.      Cervical back: Normal range of motion and neck supple.   Skin:     General: Skin is warm and dry.      Findings: Lesion present.          Neurological:      Mental Status: She is alert and oriented to person, place, and time.   Psychiatric:         Behavior: Behavior normal.         Thought Content: Thought content normal.         Judgment: Judgment normal.       BP (!) 168/98   Pulse 85   Resp 16   Ht 1.598 m (5' 2.9\")   Wt 116.6 kg (257 lb)   SpO2 97%   BMI 45.67 kg/m²     Assessment:       Diagnosis Orders   1. Acute midline low back pain with left-sided sciatica  [facility] Physical [facility] - [place]    Bina - Link Welsh MD, Orthopaedic Surgery, [place]      2. Right hand pain  XR HAND RIGHT (2 VIEWS)      3. Screening for diabetes mellitus  Comprehensive Metabolic Panel    Hemoglobin A1C      4. Screening for deficiency anemia  CBC      5. Screening for lipid disorders  Lipid Panel      6. Screening for thyroid disorder  TSH with Reflex                Plan:   Assessment & Plan   Return if symptoms worsen or fail to improve, for as scheduled in June.    Lower back pain- Rx given for referral to PT and ortho, follow up as needed.  Right hand pain- Rx given for xray, follow up pending results.  Chronic conditions- Encouraged diet/exercise. Rx given for annual labs. Pending labs will advise on pain medication. Follow up in June as scheduled/earlier if needed.    Orders Placed This Encounter   Procedures    XR HAND RIGHT (2 VIEWS)     Standing Status:   Future     Standing

## 2024-05-09 ENCOUNTER — OFFICE VISIT (OUTPATIENT)
Dept: ORTHOPEDIC SURGERY | Age: 59
End: 2024-05-09
Payer: COMMERCIAL

## 2024-05-09 ENCOUNTER — TELEPHONE (OUTPATIENT)
Dept: PAIN MANAGEMENT | Age: 59
End: 2024-05-09

## 2024-05-09 VITALS — HEIGHT: 63 IN | BODY MASS INDEX: 45.54 KG/M2 | WEIGHT: 257 LBS | RESPIRATION RATE: 14 BRPM

## 2024-05-09 DIAGNOSIS — M54.42 CHRONIC BILATERAL LOW BACK PAIN WITH LEFT-SIDED SCIATICA: Primary | ICD-10-CM

## 2024-05-09 DIAGNOSIS — M51.36 LUMBAR DEGENERATIVE DISC DISEASE: ICD-10-CM

## 2024-05-09 DIAGNOSIS — G89.29 CHRONIC BILATERAL LOW BACK PAIN WITH LEFT-SIDED SCIATICA: Primary | ICD-10-CM

## 2024-05-09 DIAGNOSIS — M79.7 FIBROMYALGIA: ICD-10-CM

## 2024-05-09 PROCEDURE — 99203 OFFICE O/P NEW LOW 30 MIN: CPT | Performed by: ORTHOPAEDIC SURGERY

## 2024-05-09 NOTE — TELEPHONE ENCOUNTER
Patient has a referral to see Dr. Howard, unable to reach office please call patient at [phone] [facility]/sc

## 2024-05-09 NOTE — PROGRESS NOTES
the Last Year: Never true   Transportation Needs: Unknown (4/29/2024)    PRAPARE - Transportation     Lack of Transportation (Medical): Not on file     Lack of Transportation (Non-Medical): No   Physical Activity: Inactive (11/4/2021)    Exercise Vital Sign     Days of Exercise per Week: 0 days     Minutes of Exercise per Session: 0 min   Stress: Stress Concern Present (11/4/2021)    [employer] [place] - Occupational Stress Questionnaire     Feeling of Stress : Very much   Social Connections: Socially Isolated (11/4/2021)    Social Connection and Isolation Panel [NHANES]     Frequency of Communication with Friends and Family: More than three times a week     Frequency of Social Gatherings with Friends and Family: Twice a week     Attends Adventism Services: Never     Active Member of Clubs or Organizations: No     Attends Club or Organization Meetings: Never     Marital Status: Never    Intimate Partner Violence: Not on file   Housing Stability: Unknown (4/29/2024)    Housing Stability Vital Sign     Unable to Pay for Housing in the Last Year: Not on file     Number of Places Lived in the Last Year: Not on file     Unstable Housing in the Last Year: No     Past Medical History:   Diagnosis Date    Anxiety     Bundle branch block     Chronic back pain     Depression     History of radiofrequency ablation (RFA) procedure for cardiac arrhythmia     for WPW    Hyperlipidemia     Hypertension     Mass of adrenal gland (HCC)     Obesity     Solitary left kidney     Transient gluten sensitivity 02/21/2019    WPW (Emiliana-Parkinson-White syndrome) 2009     Past Surgical History:   Procedure Laterality Date    ABLATION OF DYSRHYTHMIC FOCUS      x 2-3 due to WPW    APPENDECTOMY      ARM SURGERY      CARDIAC SURGERY  2009 & 2010    Heart ablasion (wpw)    COLONOSCOPY N/A 02/21/2019    COLONOSCOPY POLYPECTOMY SNARE/COLD BIOPSY performed by Oscar Arerdondo MD at [facility] OR    TONSILLECTOMY      UPPER

## 2024-05-16 ENCOUNTER — HOSPITAL ENCOUNTER (OUTPATIENT)
Dept: PHYSICAL THERAPY | Facility: CLINIC | Age: 59
Setting detail: THERAPIES SERIES
Discharge: HOME | End: 2024-05-16
Payer: COMMERCIAL

## 2024-05-16 PROCEDURE — 97162 PT EVAL MOD COMPLEX 30 MIN: CPT

## 2024-05-16 PROCEDURE — G0283 ELEC STIM OTHER THAN WOUND: HCPCS

## 2024-05-16 NOTE — CONSULTS
[] [facility]  Outpatient Rehabilitation &  Therapy  [address]  [phone]  F: [phone] [] [facility]  Outpatient Rehabilitation &  Therapy  [address]   [address]  P: [phone]  F: [phone] [x] [facility]  Outpatient Rehabilitation &  Therapy  [address]  P: [phone]  F: [phone] [] [facility]  Outpatient Rehabilitation &  Therapy  [address]  P: [phone]  F: [phone] [] [facility]  Outpatient Rehabilitation &  Therapy  [address]   P: [phone]  F: [phone]  [] [facility]  Outpatient Rehabilitation &  Therapy  [address]   P: [phone]  F: [phone] [] [facility]  Outpatient Rehabilitation &  Therapy  [address]  P: [phone]  F: [phone] [] [facility]  Outpatient Rehabilitation &  Therapy  [address]  P: [phone]  F: [phone] [] [facility]  Outpatient Rehabilitation &  Therapy  [address]  P: [phone]  F: [phone]      Physical Therapy Spine Evaluation    Date:  2024  Patient: Lorie Sharp  : 1965  MRN: [id number]  Physician: Marnie Laird APRN - CNP    Insurance: T  hard max. No auth required  Medical Diagnosis: Acute midline low back pain with left-sided sciatica (M54.42)   Rehab Codes: M54.50  Onset Date: 2008  Next 's appt.: 24    Subjective:   CC:Ms. Melo, a 59-year-old female, was referred with the diagnosis of LBP with left-sided sciatica.She reports a long history of lumbar pain dating back to . She c/o LBP with back spasms. She reports a few episodes of her left LE giving out on her.  She c/o cramps in her bilateral legs.    She c/o central LBP with radiation into the left buttock.  She has spasms that start

## 2024-05-23 ENCOUNTER — HOSPITAL ENCOUNTER (OUTPATIENT)
Dept: PHYSICAL THERAPY | Facility: CLINIC | Age: 59
Setting detail: THERAPIES SERIES
Discharge: HOME | End: 2024-05-23
Payer: COMMERCIAL

## 2024-05-23 NOTE — FLOWSHEET NOTE
[] [facility]  Outpatient Rehabilitation &  Therapy  [address]  [phone]  [phone] [] [facility]  Outpatient Rehabilitation &  Therapy  [address]  P: [phone]  F: [phone] [x] [facility]  Outpatient Rehabilitation &  Therapy  [address]  P: [phone]  F: [phone] [] [facility]  Outpatient Rehabilitation &  Therapy  [address]  [phone]  [phone] [] [facility]  Outpatient Rehabilitation &  Therapy  [address]   P: [phone]  F: [phone]  [] [facility]  Outpatient Rehabilitation &  Therapy  [address]  P: [phone]  F: [phone] [] [facility]  Outpatient Rehabilitation &  Therapy  [address]  P: [phone]  F: [phone] [] [facility]  Outpatient Rehabilitation &  Therapy  [address]  P: [phone]  F: [phone] [] [facility]  Outpatient Rehabilitation &  Therapy  [address]  P: [phone]  F: [phone]  [] [facility] Outpatient Rehabilitation &  Therapy  [address]  P: [phone]  F: [phone]     Therapy Cancel/No Show note    Date: 2024  Patient: Lorie REYNOSO Aron  : 1965  MRN: [id number]    Cancels/No Shows to date: 0    For today's appointment patient:    [x]  Cancelled    [] Rescheduled appointment    [] No-show     Reason given by patient:    []  Patient ill    []  Conflicting appointment    [] No transportation      [] Conflict with work    [] No reason given    [] Weather related    [] COVID-19    [x] Other:   family emergency   Comments:        [] Next appointment was confirmed    Electronically signed by: Shefali Coello, PTA

## 2024-05-31 ENCOUNTER — HOSPITAL ENCOUNTER (OUTPATIENT)
Dept: PHYSICAL THERAPY | Facility: CLINIC | Age: 59
Setting detail: THERAPIES SERIES
Discharge: HOME | End: 2024-05-31
Payer: COMMERCIAL

## 2024-05-31 PROCEDURE — G0283 ELEC STIM OTHER THAN WOUND: HCPCS

## 2024-05-31 PROCEDURE — 97110 THERAPEUTIC EXERCISES: CPT

## 2024-05-31 NOTE — FLOWSHEET NOTE
[] [facility]  Outpatient Rehabilitation &  Therapy  [address]  [phone]  [phone] [] [facility]  Outpatient Rehabilitation &  Therapy  [address]  P: [phone]  F: [phone] [x] [facility]  Outpatient Rehabilitation &  Therapy  [address]  P: [phone]  F: [phone] [] [facility]  Outpatient Rehabilitation &  Therapy  [address]  [phone]  [phone] [] [facility]  Outpatient Rehabilitation &  Therapy  [address]   P: [phone]  F: [phone]  [] [facility]  Outpatient Rehabilitation &  Therapy  [address]  P: [phone]  F: [phone] [] [facility]  Outpatient Rehabilitation &  Therapy  [address]  P: [phone]  F: [phone] [] [facility]  Outpatient Rehabilitation &  Therapy  [address]  P: [phone]  F: [phone] [] [facility]  Outpatient Rehabilitation &  Therapy  [address]  P: [phone]  F: [phone]  [] [facility] Outpatient Rehabilitation &  Therapy  [address]  P: [phone]  F: [phone]     Physical Therapy Daily Treatment Note    Date:  2024  Patient Name:  Lorie Sharp    :  1965  MRN: [id number]  Physician: Marnie Laird APRN - CNP                              Insurance: [employer]  hard max. No auth required  Medical Diagnosis: Acute midline low back pain with left-sided sciatica (M54.42)                        Rehab Codes: M54.50  Onset Date: 2008                      Next 's appt.: 24  Visit# / total visits: 2/15; Progress note due at visit 10     Cancels/No Shows: 1/0    Subjective:    Pain:  [] Yes  [] No Location: central lumbar N/A Pain Rating: (0-10 scale) 2/10  Pain altered Tx:  []

## 2024-06-06 ENCOUNTER — HOSPITAL ENCOUNTER (OUTPATIENT)
Dept: PHYSICAL THERAPY | Facility: CLINIC | Age: 59
Setting detail: THERAPIES SERIES
Discharge: HOME | End: 2024-06-06

## 2024-06-06 NOTE — FLOWSHEET NOTE
[] [facility]  Outpatient Rehabilitation &  Therapy  [address]  [phone]  [phone] [] [facility]  Outpatient Rehabilitation &  Therapy  [address]  P: [phone]  F: [phone] [x] [facility]  Outpatient Rehabilitation &  Therapy  [address]  P: [phone]  F: [phone] [] [facility]  Outpatient Rehabilitation &  Therapy  [address]  [phone]  [phone] [] [facility]  Outpatient Rehabilitation &  Therapy  [address]   P: [phone]  F: [phone]  [] [facility]  Outpatient Rehabilitation &  Therapy  [address]  P: [phone]  F: [phone] [] [facility]  Outpatient Rehabilitation &  Therapy  [address]  P: [phone]  F: [phone] [] [facility]  Outpatient Rehabilitation &  Therapy  [address]  P: [phone]  F: [phone] [] [facility]  Outpatient Rehabilitation &  Therapy  [address]  P: [phone]  F: [phone]  [] [facility] Outpatient Rehabilitation &  Therapy  [address]  P: [phone]  F: [phone]     Therapy Cancel/No Show note    Date: 2024  Patient: Lorie REYNOSO Aron  : 1965  MRN: [id number]    Cancels/No Shows to date: 0    For today's appointment patient:    [x]  Cancelled    [] Rescheduled appointment    [] No-show     Reason given by patient:    []  Patient ill    []  Conflicting appointment    [] No transportation      [] Conflict with work    [x] No reason given    [] Weather related    [] COVID-19    [] Other:      Comments:        [x] Next appointment was confirmed    Electronically signed by: Shefali Coello, PTA

## 2024-06-13 ENCOUNTER — HOSPITAL ENCOUNTER (OUTPATIENT)
Dept: PHYSICAL THERAPY | Facility: CLINIC | Age: 59
Setting detail: THERAPIES SERIES
Discharge: HOME | End: 2024-06-13

## 2024-06-13 NOTE — FLOWSHEET NOTE
[] [facility]  Outpatient Rehabilitation &  Therapy  [address]  [phone]  [phone] [] [facility]  Outpatient Rehabilitation &  Therapy  [address]  P: [phone]  F: [phone] [x] [facility]  Outpatient Rehabilitation &  Therapy  [address]  P: [phone]  F: [phone] [] [facility]  Outpatient Rehabilitation &  Therapy  [address]  [phone]  [phone] [] [facility]  Outpatient Rehabilitation &  Therapy  [address]   P: [phone]  F: [phone]  [] [facility]  Outpatient Rehabilitation &  Therapy  [address]  P: [phone]  F: [phone] [] [facility]  Outpatient Rehabilitation &  Therapy  [address]  P: [phone]  F: [phone] [] [facility]  Outpatient Rehabilitation &  Therapy  [address]  P: [phone]  F: [phone] [] [facility]  Outpatient Rehabilitation &  Therapy  [address]  P: [phone]  F: [phone]  [] [facility] Outpatient Rehabilitation &  Therapy  [address]  P: [phone]  F: [phone]     Therapy Cancel/No Show note    Date: 2024  Patient: Lorie REYNOSO Aron  : 1965  MRN: [id number]    Cancels/No Shows to date: 3/0    For today's appointment patient:    [x]  Cancelled    [] Rescheduled appointment    [] No-show     Reason given by patient:    []  Patient ill    []  Conflicting appointment    [] No transportation      [] Conflict with work    [x] No reason given    [] Weather related    [] COVID-19    [] Other:      Comments:        [] Next appointment was confirmed    Electronically signed by: Kym Winters PTA

## 2024-06-14 ENCOUNTER — OFFICE VISIT (OUTPATIENT)
Dept: PRIMARY CARE CLINIC | Age: 59
End: 2024-06-14
Payer: COMMERCIAL

## 2024-06-14 ENCOUNTER — TELEPHONE (OUTPATIENT)
Dept: PRIMARY CARE CLINIC | Age: 59
End: 2024-06-14

## 2024-06-14 VITALS
OXYGEN SATURATION: 96 % | BODY MASS INDEX: 44.41 KG/M2 | DIASTOLIC BLOOD PRESSURE: 86 MMHG | HEART RATE: 100 BPM | SYSTOLIC BLOOD PRESSURE: 140 MMHG | WEIGHT: 250 LBS

## 2024-06-14 DIAGNOSIS — R06.02 SHORTNESS OF BREATH: ICD-10-CM

## 2024-06-14 DIAGNOSIS — R22.32 AXILLARY MASS, LEFT: Primary | ICD-10-CM

## 2024-06-14 DIAGNOSIS — R07.9 CHEST PAIN, UNSPECIFIED TYPE: Primary | ICD-10-CM

## 2024-06-14 DIAGNOSIS — L02.92 BOILS: ICD-10-CM

## 2024-06-14 PROCEDURE — 99214 OFFICE O/P EST MOD 30 MIN: CPT | Performed by: NURSE PRACTITIONER

## 2024-06-14 PROCEDURE — 3077F SYST BP >= 140 MM HG: CPT | Performed by: NURSE PRACTITIONER

## 2024-06-14 PROCEDURE — 3079F DIAST BP 80-89 MM HG: CPT | Performed by: NURSE PRACTITIONER

## 2024-06-14 RX ORDER — DOXYCYCLINE HYCLATE 100 MG/1
100 CAPSULE ORAL 2 TIMES DAILY
Qty: 40 CAPSULE | Refills: 0 | Status: SHIPPED | OUTPATIENT
Start: 2024-06-14 | End: 2024-07-04

## 2024-06-14 ASSESSMENT — ENCOUNTER SYMPTOMS
BACK PAIN: 0
ABDOMINAL PAIN: 0
COUGH: 0
SHORTNESS OF BREATH: 1

## 2024-06-14 NOTE — TELEPHONE ENCOUNTER
While attempting to schedule mammogram van pt reported she has a lump in the left axillary region. Scheduling prompts state pt needs an order for a mammogram with US. Pt's mammogram order expires 06/22/24.    Please advise

## 2024-06-14 NOTE — PROGRESS NOTES
[facility]  [facility] PRIMARY CARE  [address] [place] [address] [address]  [address]  [facility] [address]  Dept: [phone]  Dept Fax: [phone]    Lorie Sharp is a 59 y.o. female who presentstoday for her medical conditions/complaints as noted below.  Lorie Sharp is c/o of  Chief Complaint   Patient presents with    Cyst     Has cysts or boils around body, on arm, side, by breasts, in armpit, some leak blood & fluid,  painful, red           HPI:     Presents for acute visit with multiple concerns  BP elevated- has not taken meds  Has lost 7lb since LOV    Main concern/reason for visit is continued boils  Bilateral upper arms, axilla, sides of abdomen  No improvement with use of bactrim  Will change rx to doxy, referral to derm    Has swelling in left axilla, most likely related to boils  Needs to update mammogram- order made    SOB at rest during exam  States she is unable to shower now d/t increase in SOB  Chest pain that comes/goes  Will update stress/echo  Unable to tolerate treadmill  Reviewed the importance of med adherence for BP control     Denies any other problems/concerns        Hemoglobin A1C (%)   Date Value   04/29/2024 5.6   06/22/2023 5.7   10/31/2019 5.3             ( goal A1C is < 7)   No components found for: \"LABMICR\"  No components found for: \"LDLCHOLESTEROL\", \"LDLCALC\"    (goal LDL is <100)   AST (U/L)   Date Value   04/29/2024 34     ALT (U/L)   Date Value   04/29/2024 31     BUN (mg/dL)   Date Value   04/29/2024 14     BP Readings from Last 3 Encounters:   06/14/24 (!) 140/86   04/29/24 (!) 168/98   01/15/24 118/78          (unpz993/80)    Past Medical History:   Diagnosis Date    Anxiety     Bundle branch block     Chronic back pain     Depression     History of radiofrequency ablation (RFA) procedure for cardiac arrhythmia     for WPW    Hyperlipidemia     Hypertension     Mass of adrenal gland (HCC)     Obesity     Solitary left kidney     Transient gluten sensitivity

## 2024-06-20 ENCOUNTER — HOSPITAL ENCOUNTER (OUTPATIENT)
Dept: PHYSICAL THERAPY | Facility: CLINIC | Age: 59
Setting detail: THERAPIES SERIES
Discharge: HOME | End: 2024-06-20

## 2024-06-20 NOTE — FLOWSHEET NOTE
[] [facility]  Outpatient Rehabilitation &  Therapy  [address]  [phone]  [phone] [] [facility]  Outpatient Rehabilitation &  Therapy  [address]  P: [phone]  F: [phone] [x] [facility]  Outpatient Rehabilitation &  Therapy  [address]  P: [phone]  F: [phone] [] [facility]  Outpatient Rehabilitation &  Therapy  [address]  [phone]  [phone] [] [facility]  Outpatient Rehabilitation &  Therapy  [address]   P: [phone]  F: [phone]  [] [facility]  Outpatient Rehabilitation &  Therapy  [address]  P: [phone]  F: [phone] [] [facility]  Outpatient Rehabilitation &  Therapy  [address]  P: [phone]  F: [phone] [] [facility]  Outpatient Rehabilitation &  Therapy  [address]  P: [phone]  F: [phone] [] [facility]  Outpatient Rehabilitation &  Therapy  [address]  P: [phone]  F: [phone]  [] [facility] Outpatient Rehabilitation &  Therapy  [address]  P: [phone]  F: [phone]     Therapy Cancel/No Show note    Date: 2024  Patient: Lorie REYNOSO Aron  : 1965  MRN: [id number]    Cancels/No Shows to date:     For today's appointment patient:    [x]  Cancelled    [] Rescheduled appointment    [] No-show     Reason given by patient:    []  Patient ill    []  Conflicting appointment    [] No transportation      [] Conflict with work    [] No reason given    [] Weather related    [] COVID-19    [x] Other:   Too hot and is having difficulty breathing.    Comments:        [x] Next appointment was confirmed    Electronically signed by: Shefali Coello PTA

## 2024-06-28 ENCOUNTER — HOSPITAL ENCOUNTER (OUTPATIENT)
Dept: PHYSICAL THERAPY | Facility: CLINIC | Age: 59
Setting detail: THERAPIES SERIES
Discharge: HOME | End: 2024-06-28
Payer: COMMERCIAL

## 2024-06-28 PROCEDURE — 97110 THERAPEUTIC EXERCISES: CPT

## 2024-06-28 NOTE — FLOWSHEET NOTE
[] [facility]  Outpatient Rehabilitation &  Therapy  [address]  [phone]  [phone] [] [facility]  Outpatient Rehabilitation &  Therapy  [address]  P: [phone]  F: [phone] [x] [facility]  Outpatient Rehabilitation &  Therapy  [address]  P: [phone]  F: [phone] [] [facility]  Outpatient Rehabilitation &  Therapy  [address]  [phone]  [phone] [] [facility]  Outpatient Rehabilitation &  Therapy  [address]   P: [phone]  F: [phone]  [] [facility]  Outpatient Rehabilitation &  Therapy  [address]  P: [phone]  F: [phone] [] [facility]  Outpatient Rehabilitation &  Therapy  [address]  P: [phone]  F: [phone] [] [facility]  Outpatient Rehabilitation &  Therapy  [address]  P: [phone]  F: [phone] [] [facility]  Outpatient Rehabilitation &  Therapy  [address]  P: [phone]  F: [phone]  [] [facility] Outpatient Rehabilitation &  Therapy  [address]  P: [phone]  F: [phone]     Physical Therapy Daily Treatment Note    Date:  2024  Patient Name:  Lorie Sharp    :  1965  MRN: [id number]  Physician: Marnie Laird APRN - CNP                              Insurance: [employer]  hard max. No auth required  Medical Diagnosis: Acute midline low back pain with left-sided sciatica (M54.42)                        Rehab Codes: M54.50  Onset Date: 2008                      Next 's appt.: 24  Visit# / total visits: 3/15; Progress note due at visit 10     Cancels/No Shows: 4/0    Subjective:    Pain:  [] Yes  [] No Location: central lumbar N/A Pain Rating: (0-10 scale) 2/10  Pain altered Tx:  []

## 2024-07-05 ENCOUNTER — HOSPITAL ENCOUNTER (OUTPATIENT)
Dept: PHYSICAL THERAPY | Facility: CLINIC | Age: 59
Setting detail: THERAPIES SERIES
Discharge: HOME | End: 2024-07-05

## 2024-07-05 ENCOUNTER — TELEPHONE (OUTPATIENT)
Dept: PRIMARY CARE CLINIC | Age: 59
End: 2024-07-05

## 2024-07-05 ENCOUNTER — TELEPHONE (OUTPATIENT)
Age: 59
End: 2024-07-05

## 2024-07-05 NOTE — FLOWSHEET NOTE
[] [facility]  Outpatient Rehabilitation &  Therapy  [address]  [phone]  [phone] [] [facility]  Outpatient Rehabilitation &  Therapy  [address]  P: [phone]  F: [phone] [x] [facility]  Outpatient Rehabilitation &  Therapy  [address]  P: [phone]  F: [phone] [] [facility]  Outpatient Rehabilitation &  Therapy  [address]  [phone]  [phone] [] [facility]  Outpatient Rehabilitation &  Therapy  [address]   P: [phone]  F: [phone]  [] [facility]  Outpatient Rehabilitation &  Therapy  [address]  P: [phone]  F: [phone] [] [facility]  Outpatient Rehabilitation &  Therapy  [address]  P: [phone]  F: [phone] [] [facility]  Outpatient Rehabilitation &  Therapy  [address]  P: [phone]  F: [phone] [] [facility]  Outpatient Rehabilitation &  Therapy  [address]  P: [phone]  F: [phone]  [] [facility] Outpatient Rehabilitation &  Therapy  [address]  P: [phone]  F: [phone]     Therapy Cancel/No Show note    Date: 2024  Patient: Lorie REYNOSO Aron  : 1965  MRN: [id number]    Cancels/No Shows to date:     For today's appointment patient:    [x]  Cancelled    [] Rescheduled appointment    [] No-show     Reason given by patient:    []  Patient ill    []  Conflicting appointment    [] No transportation      [x] Conflict with work    [] No reason given    [] Weather related    [] COVID-19    [] Other:       Comments:        [x] Next appointment was confirmed    Electronically signed by: Kwadwo Johnson PTA

## 2024-07-05 NOTE — TELEPHONE ENCOUNTER
Patient returned phone call. Location and time of appointment confirmed. Questions from patient answered. NPO status enforced.

## 2024-07-05 NOTE — TELEPHONE ENCOUNTER
[facility] department called stating Nuclear stress test with myocardial perfusion  is not covered       Case # [id number]    [phone]    Procedure code 10668

## 2024-07-06 NOTE — TELEPHONE ENCOUNTER
The patient called asking if PCP can resend the script to [facility] pharmacy, she states she forgot to  the previous script and then went out of town.     Please advise.    Last OV:  6/14/2024    Next OV: Visit date not found    Pharmacy:   [address] P [phone] - F [phone]  [address] [address]  Phone: [phone] Fax: [phone]       Confirmed? Yes

## 2024-07-06 NOTE — TELEPHONE ENCOUNTER
Spoke with the patient, she is going to be cancelling the stress test and calling her insurance to see why it was not covered.

## 2024-07-06 NOTE — TELEPHONE ENCOUNTER
Please have patient check with her insurance as to why a stress test would not be covered for her chest pain

## 2024-07-07 RX ORDER — DOXYCYCLINE HYCLATE 100 MG/1
100 CAPSULE ORAL 2 TIMES DAILY
Qty: 40 CAPSULE | Refills: 0 | Status: SHIPPED | OUTPATIENT
Start: 2024-07-07 | End: 2024-07-27

## 2024-07-10 ENCOUNTER — HOSPITAL ENCOUNTER (EMERGENCY)
Age: 59
Discharge: HOME | End: 2024-07-10
Attending: EMERGENCY MEDICINE
Payer: COMMERCIAL

## 2024-07-10 ENCOUNTER — APPOINTMENT (OUTPATIENT)
Dept: GENERAL RADIOLOGY | Age: 59
End: 2024-07-10
Payer: COMMERCIAL

## 2024-07-10 VITALS
HEART RATE: 80 BPM | TEMPERATURE: 97.9 F | DIASTOLIC BLOOD PRESSURE: 78 MMHG | WEIGHT: 250 LBS | OXYGEN SATURATION: 93 % | BODY MASS INDEX: 46.01 KG/M2 | RESPIRATION RATE: 22 BRPM | SYSTOLIC BLOOD PRESSURE: 162 MMHG | HEIGHT: 62 IN

## 2024-07-10 DIAGNOSIS — J44.1 COPD EXACERBATION (HCC): Primary | ICD-10-CM

## 2024-07-10 LAB
ANION GAP SERPL CALCULATED.3IONS-SCNC: 10 MMOL/L (ref 9–17)
BASOPHILS # BLD: 0.1 K/UL (ref 0–0.2)
BASOPHILS NFR BLD: 1 % (ref 0–2)
BNP SERPL-MCNC: 208 PG/ML
BUN SERPL-MCNC: 17 MG/DL (ref 6–20)
CALCIUM SERPL-MCNC: 9.2 MG/DL (ref 8.6–10.4)
CHLORIDE SERPL-SCNC: 107 MMOL/L (ref 98–107)
CO2 SERPL-SCNC: 24 MMOL/L (ref 20–31)
CREAT SERPL-MCNC: 1 MG/DL (ref 0.5–0.9)
EOSINOPHIL # BLD: 0.4 K/UL (ref 0–0.4)
EOSINOPHILS RELATIVE PERCENT: 5 % (ref 1–4)
ERYTHROCYTE [DISTWIDTH] IN BLOOD BY AUTOMATED COUNT: 15.5 % (ref 12.5–15.4)
GFR, ESTIMATED: 66 ML/MIN/1.73M2
GLUCOSE SERPL-MCNC: 107 MG/DL (ref 70–99)
HCT VFR BLD AUTO: 42.6 % (ref 36–46)
HGB BLD-MCNC: 14 G/DL (ref 12–16)
INR PPP: 0.9
LYMPHOCYTES NFR BLD: 1.6 K/UL (ref 1–4.8)
LYMPHOCYTES RELATIVE PERCENT: 20 % (ref 24–44)
MAGNESIUM SERPL-MCNC: 2 MG/DL (ref 1.6–2.6)
MCH RBC QN AUTO: 28.8 PG (ref 26–34)
MCHC RBC AUTO-ENTMCNC: 32.9 G/DL (ref 31–37)
MCV RBC AUTO: 87.6 FL (ref 80–100)
MONOCYTES NFR BLD: 0.5 K/UL (ref 0.1–1.2)
MONOCYTES NFR BLD: 7 % (ref 2–11)
NEUTROPHILS NFR BLD: 67 % (ref 36–66)
NEUTS SEG NFR BLD: 5.4 K/UL (ref 1.8–7.7)
PARTIAL THROMBOPLASTIN TIME: 25.9 SEC (ref 21.3–31.3)
PLATELET # BLD AUTO: 218 K/UL (ref 140–450)
PMV BLD AUTO: 9.5 FL (ref 6–12)
POTASSIUM SERPL-SCNC: 4 MMOL/L (ref 3.7–5.3)
PROTHROMBIN TIME: 10 SEC (ref 9.4–12.6)
RBC # BLD AUTO: 4.87 M/UL (ref 4–5.2)
SARS-COV-2 RDRP RESP QL NAA+PROBE: NOT DETECTED
SODIUM SERPL-SCNC: 141 MMOL/L (ref 135–144)
SPECIMEN DESCRIPTION: NORMAL
TROPONIN I SERPL HS-MCNC: 7 NG/L (ref 0–14)
TROPONIN I SERPL HS-MCNC: 8 NG/L (ref 0–14)
WBC OTHER # BLD: 8 K/UL (ref 3.5–11)

## 2024-07-10 PROCEDURE — 71046 X-RAY EXAM CHEST 2 VIEWS: CPT

## 2024-07-10 PROCEDURE — 83880 ASSAY OF NATRIURETIC PEPTIDE: CPT

## 2024-07-10 PROCEDURE — 99285 EMERGENCY DEPT VISIT HI MDM: CPT

## 2024-07-10 PROCEDURE — 6360000002 HC RX W HCPCS: Performed by: EMERGENCY MEDICINE

## 2024-07-10 PROCEDURE — 6370000000 HC RX 637 (ALT 250 FOR IP): Performed by: EMERGENCY MEDICINE

## 2024-07-10 PROCEDURE — 80048 BASIC METABOLIC PNL TOTAL CA: CPT

## 2024-07-10 PROCEDURE — 83735 ASSAY OF MAGNESIUM: CPT

## 2024-07-10 PROCEDURE — 85730 THROMBOPLASTIN TIME PARTIAL: CPT

## 2024-07-10 PROCEDURE — 93005 ELECTROCARDIOGRAM TRACING: CPT | Performed by: EMERGENCY MEDICINE

## 2024-07-10 PROCEDURE — 36415 COLL VENOUS BLD VENIPUNCTURE: CPT

## 2024-07-10 PROCEDURE — 94640 AIRWAY INHALATION TREATMENT: CPT

## 2024-07-10 PROCEDURE — 87635 SARS-COV-2 COVID-19 AMP PRB: CPT

## 2024-07-10 PROCEDURE — 84484 ASSAY OF TROPONIN QUANT: CPT

## 2024-07-10 PROCEDURE — 96374 THER/PROPH/DIAG INJ IV PUSH: CPT

## 2024-07-10 PROCEDURE — 85610 PROTHROMBIN TIME: CPT

## 2024-07-10 PROCEDURE — 85025 COMPLETE CBC W/AUTO DIFF WBC: CPT

## 2024-07-10 RX ORDER — PREDNISONE 50 MG/1
50 TABLET ORAL DAILY
Qty: 5 TABLET | Refills: 0 | Status: SHIPPED | OUTPATIENT
Start: 2024-07-10 | End: 2024-07-15

## 2024-07-10 RX ORDER — IPRATROPIUM BROMIDE AND ALBUTEROL SULFATE 2.5; .5 MG/3ML; MG/3ML
1 SOLUTION RESPIRATORY (INHALATION) ONCE
Status: COMPLETED | OUTPATIENT
Start: 2024-07-10 | End: 2024-07-10

## 2024-07-10 RX ORDER — ALBUTEROL SULFATE 2.5 MG/3ML
5 SOLUTION RESPIRATORY (INHALATION) ONCE
Status: COMPLETED | OUTPATIENT
Start: 2024-07-10 | End: 2024-07-10

## 2024-07-10 RX ADMIN — ALBUTEROL SULFATE 5 MG: 2.5 SOLUTION RESPIRATORY (INHALATION) at 10:10

## 2024-07-10 RX ADMIN — METHYLPREDNISOLONE SODIUM SUCCINATE 125 MG: 125 INJECTION, POWDER, FOR SOLUTION INTRAMUSCULAR; INTRAVENOUS at 09:51

## 2024-07-10 RX ADMIN — IPRATROPIUM BROMIDE AND ALBUTEROL SULFATE 1 DOSE: .5; 2.5 SOLUTION RESPIRATORY (INHALATION) at 10:02

## 2024-07-10 ASSESSMENT — ENCOUNTER SYMPTOMS
ABDOMINAL PAIN: 0
EYE REDNESS: 0
CHEST TIGHTNESS: 0
NAUSEA: 0
COUGH: 1
DIARRHEA: 0
VOMITING: 0
CONSTIPATION: 0
SHORTNESS OF BREATH: 1

## 2024-07-10 NOTE — ED PROVIDER NOTES
[facility] Emergency Department  [address]  [place] [facility] [address]  Phone: [phone]  Fax: [phone]        Pt Name: Lorie Sharp  MRN: [id number]  Birthdate 1965  Date of evaluation: 7/10/24      CHIEF COMPLAINT     Chief Complaint   Patient presents with    Shortness of Breath         HISTORY OF PRESENT ILLNESS    Lorie Sharp is a 59 y.o. female who presents to our Emergency Department.    Patient complains of shortness of breath has been ongoing for the past couple days but has worsened.  Patient states that she is a current smoker.  States that she still smokes.  Tried going to work today but states that she was so short of breath so she came to the ER.  Patient states that she does not have any chest pain.  No lower extreme edema.  No recent mobilization no recent surgeries.  Patient denies any fevers or chills.  States that maybe she has a slight cough but she does not think she has a very large 1.  Patient denies any sick contacts that she is aware of.  Patient denies any history of DVT or PE.  Patient states that she has been so short of breath she cannot even smoke at this time.  Patient states that she also had a stress test and echo which was not covered by insurance so she canceled the appointment          REVIEW OF SYSTEMS       Review of Systems   Constitutional:  Negative for chills, diaphoresis and fever.   HENT:  Negative for drooling.    Eyes:  Negative for redness.   Respiratory:  Positive for cough and shortness of breath. Negative for chest tightness.    Cardiovascular:  Negative for chest pain, palpitations and leg swelling.   Gastrointestinal:  Negative for abdominal pain, constipation, diarrhea, nausea and vomiting.   Genitourinary:  Negative for dysuria and hematuria.   Musculoskeletal:  Negative for neck stiffness.   Skin:  Negative for rash.   Neurological:  Negative for weakness, numbness and headaches.   Psychiatric/Behavioral:  Negative  decision making was utilized in the discharge decision and patient/family in agreement with current plan of care. Patient told to return to ED for any worsening symptoms. Patient remains stable, will discharge home. They were given the opportunity to ask any questions regarding their care. These questions were answered to their satisfaction. Patient/family understands that early in the process of an illness or injury, an emergency department workup can be falsely reassuring and will return if symptoms worsen.     Impression:   1. COPD exacerbation (HCC)        CONDITION ON DISPOSITION:   Stable    PATIENT REFERRED TO:  Marnie Laird, APRN - CNP  [address]  [address]  [facility] [id number] [phone]    Schedule an appointment as soon as possible for a visit in 2 days      [facility] Emergency Department  [address]  [facility] [address] [phone]  Go to   As needed, If symptoms worsen      DISCHARGE MEDICATIONS:  New Prescriptions    PREDNISONE (DELTASONE) 50 MG TABLET    Take 1 tablet by mouth daily for 5 days          This note was created using Dragon dictation software. The note was briefly reviewed and proofread, but may contain some grammatical and phonetic errors.    Jensen Cuellar DO  Emergency Medicine Physician       To, Jensen PRADO DO  07/10/24 7911

## 2024-07-10 NOTE — DISCHARGE INSTRUCTIONS
If you had imaging today, your results are:  XR CHEST (2 VW)   Final Result   No significant findings in the chest.             Take your medication as indicated and prescribed.  If you are given an antibiotic then, make sure you get the prescription filled and take the antibiotics until finished.      PLEASE RETURN TO THE EMERGENCY DEPARTMENT IMMEDIATELY if your symptoms worsen in anyway or in 8-12 hours if not improved for re-evaluation.  You should immediately return to the ER for symptoms such as increasing pain, bloody stool, fever, a feeling of passing out, light headed, dizziness, chest pain, shortness of breath, persistent nausea and/or vomiting, numbness or weakness to the arms or legs, coolness or color change of the arms or legs.      Please understand that at this time there is no evidence for a more serious underlying process, but that early in the process of an illness or injury, an emergency department workup can be falsely reassuring.  You should contact your family doctor within the next 24 hours for a follow up appointment. If you do not have one, we have attached the \"[facility] Same Day\" Physician line for you to call and they can provide you with one ([phone]). .    THANK YOU!    From [facility] and [facility]    On behalf of the Emergency Department staff at [facility], I would like to thank you for giving us the opportunity to address your health care needs and concerns.    We hope that during your visit, our service was delivered in a professional and caring manner. Please keep [facility] in mind as we walk with you down the path to your own personal wellness.     Please expect an automated text message or email from us so we can ask a few questions about your health and progress. Based on your answers, a clinician may call you back to offer help and instructions.    Please understand that early in the process of an illness or injury, an emergency department  workup can be falsely reassuring.  If you notice any worsening, changing or persistent symptoms please call your family doctor or return to the ER immediately.     Tell us how we did during your visit at [email or web address]   and let us know about your experience

## 2024-07-11 LAB
EKG ATRIAL RATE: 86 BPM
EKG P AXIS: 62 DEGREES
EKG P-R INTERVAL: 150 MS
EKG Q-T INTERVAL: 390 MS
EKG QRS DURATION: 116 MS
EKG QTC CALCULATION (BAZETT): 466 MS
EKG R AXIS: 48 DEGREES
EKG T AXIS: 36 DEGREES
EKG VENTRICULAR RATE: 86 BPM

## 2024-07-15 ENCOUNTER — HOSPITAL ENCOUNTER (EMERGENCY)
Age: 59
Discharge: HOME | End: 2024-07-15
Attending: EMERGENCY MEDICINE
Payer: COMMERCIAL

## 2024-07-15 VITALS
TEMPERATURE: 97.6 F | DIASTOLIC BLOOD PRESSURE: 99 MMHG | WEIGHT: 250 LBS | HEIGHT: 62 IN | HEART RATE: 82 BPM | SYSTOLIC BLOOD PRESSURE: 172 MMHG | BODY MASS INDEX: 46.01 KG/M2 | OXYGEN SATURATION: 97 % | RESPIRATION RATE: 22 BRPM

## 2024-07-15 DIAGNOSIS — M62.838 SPASM OF MUSCLE: ICD-10-CM

## 2024-07-15 DIAGNOSIS — M54.32 SCIATICA OF LEFT SIDE: ICD-10-CM

## 2024-07-15 DIAGNOSIS — G89.29 ACUTE EXACERBATION OF CHRONIC LOW BACK PAIN: Primary | ICD-10-CM

## 2024-07-15 DIAGNOSIS — M54.50 ACUTE EXACERBATION OF CHRONIC LOW BACK PAIN: Primary | ICD-10-CM

## 2024-07-15 PROCEDURE — 6360000002 HC RX W HCPCS

## 2024-07-15 PROCEDURE — 6370000000 HC RX 637 (ALT 250 FOR IP)

## 2024-07-15 PROCEDURE — 96372 THER/PROPH/DIAG INJ SC/IM: CPT

## 2024-07-15 PROCEDURE — 99284 EMERGENCY DEPT VISIT MOD MDM: CPT

## 2024-07-15 RX ORDER — MORPHINE SULFATE 4 MG/ML
4 INJECTION, SOLUTION INTRAMUSCULAR; INTRAVENOUS ONCE
Status: COMPLETED | OUTPATIENT
Start: 2024-07-15 | End: 2024-07-15

## 2024-07-15 RX ORDER — KETOROLAC TROMETHAMINE 30 MG/ML
30 INJECTION, SOLUTION INTRAMUSCULAR; INTRAVENOUS ONCE
Status: COMPLETED | OUTPATIENT
Start: 2024-07-15 | End: 2024-07-15

## 2024-07-15 RX ORDER — LIDOCAINE 4 G/G
1 PATCH TOPICAL ONCE
Status: DISCONTINUED | OUTPATIENT
Start: 2024-07-15 | End: 2024-07-15 | Stop reason: HOSPADM

## 2024-07-15 RX ORDER — DEXAMETHASONE SODIUM PHOSPHATE 10 MG/ML
8 INJECTION, SOLUTION INTRAMUSCULAR; INTRAVENOUS ONCE
Status: COMPLETED | OUTPATIENT
Start: 2024-07-15 | End: 2024-07-15

## 2024-07-15 RX ORDER — DIAZEPAM 5 MG/1
5 TABLET ORAL ONCE
Status: COMPLETED | OUTPATIENT
Start: 2024-07-15 | End: 2024-07-15

## 2024-07-15 RX ORDER — DIAZEPAM 5 MG/1
5 TABLET ORAL EVERY 8 HOURS PRN
Qty: 9 TABLET | Refills: 0 | Status: SHIPPED | OUTPATIENT
Start: 2024-07-15 | End: 2024-07-18

## 2024-07-15 RX ADMIN — DEXAMETHASONE SODIUM PHOSPHATE 8 MG: 10 INJECTION, SOLUTION INTRAMUSCULAR; INTRAVENOUS at 12:47

## 2024-07-15 RX ADMIN — DIAZEPAM 5 MG: 5 TABLET ORAL at 12:47

## 2024-07-15 RX ADMIN — MORPHINE SULFATE 4 MG: 4 INJECTION INTRAVENOUS at 13:26

## 2024-07-15 RX ADMIN — KETOROLAC TROMETHAMINE 30 MG: 30 INJECTION, SOLUTION INTRAMUSCULAR; INTRAVENOUS at 12:46

## 2024-07-15 ASSESSMENT — PAIN - FUNCTIONAL ASSESSMENT: PAIN_FUNCTIONAL_ASSESSMENT: 0-10

## 2024-07-15 ASSESSMENT — PAIN SCALES - GENERAL
PAINLEVEL_OUTOF10: 10
PAINLEVEL_OUTOF10: 10

## 2024-07-15 ASSESSMENT — ENCOUNTER SYMPTOMS
SHORTNESS OF BREATH: 0
BACK PAIN: 1
DIARRHEA: 0
VOMITING: 0
COUGH: 0
CONSTIPATION: 0
ABDOMINAL PAIN: 0
NAUSEA: 0

## 2024-07-15 ASSESSMENT — PAIN DESCRIPTION - ORIENTATION: ORIENTATION: LOWER

## 2024-07-15 NOTE — ED TRIAGE NOTES
Patient is experiencing severe back pain since Thursday from sitting in chair. Pain hasn't improved. Patient also has numbness in the left leg and back spasms. Denies other  symptoms.

## 2024-07-15 NOTE — ED PROVIDER NOTES
[facility] Emergency Department      Pt Name: Lorie Sharp  MRN: [id number]  Birthdate 1965  Date of evaluation: 7/15/2024    EMERGENCY DEPARTMENT ENCOUNTER           I reviewed the mid level provider's note and agree with the documented findings and we have discussed the plan of care. I have reviewed the emergency nurses triage note. I agree with the chief complaint, past medical history, past surgical history, allergies, medications, social and family history as documented unless otherwise noted below.      Jeremie Hernandez,   07/15/24 1237

## 2024-07-15 NOTE — DISCHARGE INSTRUCTIONS
You were seen today for back pain.  This is likely a muscle spasm versus a herniated disc.      Please go to your scheduled follow-up appointment with your primary care provider on Wednesday for further evaluation and treatment recommendations.     your prescription for prednisone that was previously called in for you, take tomorrow in the morning with food to reduce stomach upset.    You may take 1000 mg of Tylenol and 800 mg of Motrin every 8 hours as needed for pain and inflammation.  Purchase over-the-counter, take as directed and as needed.    You can purchase lidocaine patches over-the-counter, apply to the affected area.  12 hours on, 12 hours off.  #3 patches at 1 time.    You may also take Valium as needed for muscle relaxation.  Please note that this may make you drowsy, so please do not drive or operate heavy machinery until you know how this medication may affect you.  Do not combine with alcohol or other medications  that make you drowsy.    It is important with lower back pain that you continue with daily activities.  Bed rest can worsen symptoms and lead to long term complications.  Make sure to stretch daily.  Avoid lifting heavy items > 20 pounds until pain improves.  Wearing a back brace in the future may help prevent future injuries.     Please call Marnie Laird APRN - CNP on the next business day for follow-up within 1 week.     During your evaluation today in the emergency department it was noted that your blood pressure was elevated.  You need to follow-up with your primary care physician in regards to this as long-term uncontrolled high blood pressure can lead to negative medical conditions including but not limited to heart attack, stroke, chronic kidney disease.      Return to the ED if you develop severe worsening of pain, inability to walk, inability to control bowel or bladder, high fevers, or any other concerns arise.

## 2024-07-15 NOTE — ED PROVIDER NOTES
[facility] Emergency Department  [address]  [facility] [address]  Phone: [phone]  Fax: [phone]        Pt Name: Lorie Sharp  MRN: [id number]  Birthdate 1965  Date of evaluation: 7/15/24    CHIEFCOMPLAINT       Chief Complaint   Patient presents with    Back Pain     Lower lumber, LLE numbness       HISTORY OF PRESENT ILLNESS (Location/Symptom, Timing/Onset, Context/Setting, Quality, Duration, Modifying Factors, Severity)      Lorie Sharp is a 59 y.o. female with pertinent PMH of WPW s/p ablation, chronic lower back pain with left-sided sciatica, bundle branch block, hyperlipidemia, hypertension, adrenal gland, solitary left kidney, lumbar degenerative disc disease, fibromyalgia who presents to the ED via private auto with complaint of left lower back pain radiating down her left leg that started 5 days ago.  Patient states she has had this in the past, states she has sciatica related to bulging disks in her lumbar spine.  Patient does have an appointment with her spine/orthopedic surgeon in August, Dr. Welsh.  Was previously seen in the ER for COPD exacerbation and prescribed prednisone however never started this medication and was planning on picking it up today for her lower back pain however drove here from work due to severe pain.  She denies any weakness to her extremities however does report some tingling to her left leg.  She denies any injury or trauma, no recent falls.  No fever or chills, chest pain or shortness of breath, history of IV drug use.    PAST MEDICAL / SURGICAL / SOCIAL / FAMILY HISTORY     PMH:  has a past medical history of Anxiety, Bundle branch block, Chronic back pain, Depression, History of radiofrequency ablation (RFA) procedure for cardiac arrhythmia, Hyperlipidemia, Hypertension, Mass of adrenal gland (HCC), Obesity, Solitary left kidney, Transient gluten sensitivity, and WPW (Emiliana-Parkinson-White syndrome).  Surgical History:

## 2024-07-17 ENCOUNTER — OFFICE VISIT (OUTPATIENT)
Dept: PRIMARY CARE CLINIC | Age: 59
End: 2024-07-17
Payer: COMMERCIAL

## 2024-07-17 VITALS
RESPIRATION RATE: 16 BRPM | SYSTOLIC BLOOD PRESSURE: 132 MMHG | WEIGHT: 257.2 LBS | HEART RATE: 90 BPM | HEIGHT: 62 IN | BODY MASS INDEX: 47.33 KG/M2 | OXYGEN SATURATION: 97 % | DIASTOLIC BLOOD PRESSURE: 82 MMHG

## 2024-07-17 DIAGNOSIS — M54.42 CHRONIC MIDLINE LOW BACK PAIN WITH BILATERAL SCIATICA: ICD-10-CM

## 2024-07-17 DIAGNOSIS — M47.817 LUMBOSACRAL SPONDYLOSIS WITHOUT MYELOPATHY: ICD-10-CM

## 2024-07-17 DIAGNOSIS — G89.29 CHRONIC MIDLINE LOW BACK PAIN WITH BILATERAL SCIATICA: ICD-10-CM

## 2024-07-17 DIAGNOSIS — M54.41 CHRONIC MIDLINE LOW BACK PAIN WITH BILATERAL SCIATICA: ICD-10-CM

## 2024-07-17 DIAGNOSIS — R06.02 SHORTNESS OF BREATH: Primary | ICD-10-CM

## 2024-07-17 PROCEDURE — 99214 OFFICE O/P EST MOD 30 MIN: CPT | Performed by: NURSE PRACTITIONER

## 2024-07-17 PROCEDURE — 3079F DIAST BP 80-89 MM HG: CPT | Performed by: NURSE PRACTITIONER

## 2024-07-17 PROCEDURE — 3075F SYST BP GE 130 - 139MM HG: CPT | Performed by: NURSE PRACTITIONER

## 2024-07-17 RX ORDER — DIAZEPAM 5 MG/1
5 TABLET ORAL EVERY 8 HOURS PRN
Qty: 90 TABLET | Refills: 0 | Status: SHIPPED | OUTPATIENT
Start: 2024-07-17 | End: 2024-08-16

## 2024-07-17 RX ORDER — GABAPENTIN 300 MG/1
300 CAPSULE ORAL 3 TIMES DAILY
Qty: 90 CAPSULE | Refills: 0
Start: 2024-07-17 | End: 2025-01-13

## 2024-07-17 SDOH — ECONOMIC STABILITY: FOOD INSECURITY: WITHIN THE PAST 12 MONTHS, THE FOOD YOU BOUGHT JUST DIDN'T LAST AND YOU DIDN'T HAVE MONEY TO GET MORE.: NEVER TRUE

## 2024-07-17 SDOH — ECONOMIC STABILITY: INCOME INSECURITY: HOW HARD IS IT FOR YOU TO PAY FOR THE VERY BASICS LIKE FOOD, HOUSING, MEDICAL CARE, AND HEATING?: NOT HARD AT ALL

## 2024-07-17 SDOH — ECONOMIC STABILITY: FOOD INSECURITY: WITHIN THE PAST 12 MONTHS, YOU WORRIED THAT YOUR FOOD WOULD RUN OUT BEFORE YOU GOT MONEY TO BUY MORE.: NEVER TRUE

## 2024-07-17 ASSESSMENT — PATIENT HEALTH QUESTIONNAIRE - PHQ9
9. THOUGHTS THAT YOU WOULD BE BETTER OFF DEAD, OR OF HURTING YOURSELF: NOT AT ALL
6. FEELING BAD ABOUT YOURSELF - OR THAT YOU ARE A FAILURE OR HAVE LET YOURSELF OR YOUR FAMILY DOWN: NOT AT ALL
1. LITTLE INTEREST OR PLEASURE IN DOING THINGS: NOT AT ALL
5. POOR APPETITE OR OVEREATING: NOT AT ALL
10. IF YOU CHECKED OFF ANY PROBLEMS, HOW DIFFICULT HAVE THESE PROBLEMS MADE IT FOR YOU TO DO YOUR WORK, TAKE CARE OF THINGS AT HOME, OR GET ALONG WITH OTHER PEOPLE: NOT DIFFICULT AT ALL
2. FEELING DOWN, DEPRESSED OR HOPELESS: NOT AT ALL
7. TROUBLE CONCENTRATING ON THINGS, SUCH AS READING THE NEWSPAPER OR WATCHING TELEVISION: NOT AT ALL
SUM OF ALL RESPONSES TO PHQ9 QUESTIONS 1 & 2: 0
SUM OF ALL RESPONSES TO PHQ QUESTIONS 1-9: 0
SUM OF ALL RESPONSES TO PHQ QUESTIONS 1-9: 0
8. MOVING OR SPEAKING SO SLOWLY THAT OTHER PEOPLE COULD HAVE NOTICED. OR THE OPPOSITE, BEING SO FIGETY OR RESTLESS THAT YOU HAVE BEEN MOVING AROUND A LOT MORE THAN USUAL: NOT AT ALL
SUM OF ALL RESPONSES TO PHQ QUESTIONS 1-9: 0
3. TROUBLE FALLING OR STAYING ASLEEP: NOT AT ALL
SUM OF ALL RESPONSES TO PHQ QUESTIONS 1-9: 0
4. FEELING TIRED OR HAVING LITTLE ENERGY: NOT AT ALL

## 2024-07-17 ASSESSMENT — ENCOUNTER SYMPTOMS
BACK PAIN: 1
ABDOMINAL PAIN: 0
COUGH: 0
SHORTNESS OF BREATH: 1

## 2024-07-17 NOTE — PROGRESS NOTES
[facility]  [facility] PRIMARY CARE  [address] [place] [address] DR  [address]  [facility] [address]  Dept: [phone]  Dept Fax: [phone]    Lorie Sharp is a 59 y.o. female who presentstoday for her medical conditions/complaints as noted below.  Lorie Sharp is c/o of  Chief Complaint   Patient presents with    Follow-Up from Hospital     Low back pain left side     Referral - General     Pulmonology     Other     FMLA Paperwork          HPI:     Presents for multiple concerns  BP well controlled  Weight is stable    Continued lower back pain, muscle spasms and sciatica  Was seen in ER and given valium with some improvement  Requesting renewal of rx- will fill until she can be evaluated with pain management at end of month  Has est with ortho, imaging completed. Recheck 8/1/24  C/o shooting pain down right leg  Unable to get comfortable during exam, yelling out in pain  Has tried gabapentin in the past, 300mg nightly. States it did nothing  Will increase to TID use    Requesting rx for handicap placard  Requesting FMLA to be completed  Will do 1-2 days as need for flare in back pain    C/o increase in SOB  Was at ER 7/10/24- chest xray wnl  States she is waking at night in panic and feels like she can't breathe  Using an albuterol inhaler prn  Will update PFT and refer to pulmonary    Plans to apply for SS/disability    Denies any other problems/concerns        Hemoglobin A1C (%)   Date Value   04/29/2024 5.6   06/22/2023 5.7   10/31/2019 5.3             ( goal A1C is < 7)   No components found for: \"LABMICR\"  No components found for: \"LDLCHOLESTEROL\", \"LDLCALC\"    (goal LDL is <100)   AST (U/L)   Date Value   04/29/2024 34     ALT (U/L)   Date Value   04/29/2024 31     BUN (mg/dL)   Date Value   07/10/2024 17     BP Readings from Last 3 Encounters:   07/17/24 132/82   07/15/24 (!) 172/99   07/10/24 (!) 162/78          ([id number])    Past Medical History:   Diagnosis Date    Anxiety

## 2024-07-21 ENCOUNTER — HOSPITAL ENCOUNTER (EMERGENCY)
Age: 59
Discharge: HOME | End: 2024-07-21
Attending: EMERGENCY MEDICINE
Payer: COMMERCIAL

## 2024-07-21 ENCOUNTER — APPOINTMENT (OUTPATIENT)
Dept: GENERAL RADIOLOGY | Age: 59
End: 2024-07-21
Payer: COMMERCIAL

## 2024-07-21 VITALS
RESPIRATION RATE: 20 BRPM | TEMPERATURE: 97.8 F | HEART RATE: 97 BPM | HEIGHT: 62 IN | OXYGEN SATURATION: 94 % | BODY MASS INDEX: 46.01 KG/M2 | DIASTOLIC BLOOD PRESSURE: 92 MMHG | SYSTOLIC BLOOD PRESSURE: 183 MMHG | WEIGHT: 250 LBS

## 2024-07-21 DIAGNOSIS — M54.50 ACUTE EXACERBATION OF CHRONIC LOW BACK PAIN: Primary | ICD-10-CM

## 2024-07-21 DIAGNOSIS — G89.29 ACUTE EXACERBATION OF CHRONIC LOW BACK PAIN: Primary | ICD-10-CM

## 2024-07-21 PROCEDURE — 96372 THER/PROPH/DIAG INJ SC/IM: CPT | Performed by: EMERGENCY MEDICINE

## 2024-07-21 PROCEDURE — 6360000002 HC RX W HCPCS: Performed by: NURSE PRACTITIONER

## 2024-07-21 PROCEDURE — 72100 X-RAY EXAM L-S SPINE 2/3 VWS: CPT

## 2024-07-21 PROCEDURE — 99284 EMERGENCY DEPT VISIT MOD MDM: CPT | Performed by: EMERGENCY MEDICINE

## 2024-07-21 RX ORDER — KETOROLAC TROMETHAMINE 30 MG/ML
30 INJECTION, SOLUTION INTRAMUSCULAR; INTRAVENOUS ONCE
Status: COMPLETED | OUTPATIENT
Start: 2024-07-21 | End: 2024-07-21

## 2024-07-21 RX ORDER — MORPHINE SULFATE 4 MG/ML
4 INJECTION, SOLUTION INTRAMUSCULAR; INTRAVENOUS ONCE
Status: COMPLETED | OUTPATIENT
Start: 2024-07-21 | End: 2024-07-21

## 2024-07-21 RX ORDER — KETOROLAC TROMETHAMINE 30 MG/ML
30 INJECTION, SOLUTION INTRAMUSCULAR; INTRAVENOUS ONCE
Status: DISCONTINUED | OUTPATIENT
Start: 2024-07-21 | End: 2024-07-21

## 2024-07-21 RX ORDER — ORPHENADRINE CITRATE 30 MG/ML
60 INJECTION INTRAMUSCULAR; INTRAVENOUS ONCE
Status: DISCONTINUED | OUTPATIENT
Start: 2024-07-21 | End: 2024-07-21

## 2024-07-21 RX ORDER — HYDROCODONE BITARTRATE AND ACETAMINOPHEN 5; 325 MG/1; MG/1
1 TABLET ORAL EVERY 8 HOURS PRN
Qty: 10 TABLET | Refills: 0 | Status: SHIPPED | OUTPATIENT
Start: 2024-07-21 | End: 2024-07-26

## 2024-07-21 RX ADMIN — MORPHINE SULFATE 4 MG: 4 INJECTION INTRAVENOUS at 16:25

## 2024-07-21 RX ADMIN — KETOROLAC TROMETHAMINE 30 MG: 30 INJECTION, SOLUTION INTRAMUSCULAR at 15:56

## 2024-07-21 NOTE — ED NOTES
Pt BP elevated at 183/92. Pt reports hx of HTN and she is due for her afternoon BP med when she gets home. Pt denies chest pain, SOB, headaches, or vision changes. Pt reports she still has lumbar back pain and believes her BP will come down soon \"once the morphine kicks in.\" Pt denies wanting any more workup in ED. Regan CNP updated and states OK for pt to be discharged home at this time.

## 2024-07-21 NOTE — ED TRIAGE NOTES
Patient has been having back pain for 11 days. Patient is unable to tolerate movement. Numbness in groin and right leg this morning.

## 2024-07-21 NOTE — DISCHARGE INSTRUCTIONS
Continue your medications as prescribed, use Norco for as needed pain relief.  Follow-up with your primary care, orthopedics, return to the ER with new or worsening symptoms

## 2024-07-21 NOTE — ED PROVIDER NOTES
[facility] EMERGENCY DEPARTMENT  eMERGENCY dEPARTMENT eNCOUnter   Independent Attestation     Pt Name: Lorie Sharp  MRN: [id number]  Birthdate 1965  Date of evaluation: 7/21/24       Lorie Sharp is a 59 y.o. female who presents with Back Pain        Based on the medical record, the care appears appropriate. I was personally available for consultation in the Emergency Department.    Keith Govea DO  Attending Emergency  Physician                Keith Govea DO  07/21/24 1600    
7.00 times per week. Drug: Marijuana (Weed). She reports that she does not drink alcohol.  Family History: She indicated that her mother is . She indicated that the status of her father is unknown. She indicated that only one of her two sisters is alive. She indicated that the status of her paternal grandmother is unknown.   family history includes Alcohol Abuse in her father and sister; Arthritis in her paternal grandmother; Cancer in her mother; Diabetes in her father, sister, and sister; Ovarian Cancer in her sister; Stroke in her mother.  Psychiatric History: None    Allergies: Gluten meal and Pcn [penicillins]    Home Medications:   Prior to Admission medications    Medication Sig Start Date End Date Taking? Authorizing Provider   HYDROcodone-acetaminophen (NORCO) 5-325 MG per tablet Take 1 tablet by mouth every 8 hours as needed for Pain for up to 5 days. Intended supply: 3 days. Take lowest dose possible to manage pain Max Daily Amount: 3 tablets 24 Yes Efrain Hinojosa APRN - CNP   gabapentin (NEURONTIN) 300 MG capsule Take 1 capsule by mouth 3 times daily for 180 days. Intended supply: 30 days 24  Marnie Laird APRN - CNP   Handicap Placard MISC by Does not apply route Expires 24   Marnie Laird APRN - CNP   diazePAM (VALIUM) 5 MG tablet Take 1 tablet by mouth every 8 hours as needed for Anxiety or Sleep for up to 30 days. Max Daily Amount: 15 mg 24  Marnie Laird APRN - CNP   doxycycline hyclate (VIBRAMYCIN) 100 MG capsule Take 1 capsule by mouth 2 times daily for 20 days Take with food, but avoid dairy, calcium and MTV's 2 hours before and after the dose 24  Marnie Laird APRN - CNP   atorvastatin (LIPITOR) 20 MG tablet Take 1 tablet by mouth daily 24   Marnie Laird APRN - CNP   buPROPion (WELLBUTRIN SR) 150 MG extended release tablet Take 1 tablet by mouth 2 times daily 24   Eitan

## 2024-07-23 ENCOUNTER — TELEPHONE (OUTPATIENT)
Dept: PRIMARY CARE CLINIC | Age: 59
End: 2024-07-23

## 2024-07-23 ENCOUNTER — APPOINTMENT (OUTPATIENT)
Dept: CT IMAGING | Age: 59
End: 2024-07-23
Payer: COMMERCIAL

## 2024-07-23 ENCOUNTER — HOSPITAL ENCOUNTER (EMERGENCY)
Age: 59
Discharge: HOME | End: 2024-07-23
Attending: EMERGENCY MEDICINE
Payer: COMMERCIAL

## 2024-07-23 ENCOUNTER — E-VISIT (OUTPATIENT)
Dept: PRIMARY CARE CLINIC | Age: 59
End: 2024-07-23
Payer: COMMERCIAL

## 2024-07-23 VITALS
WEIGHT: 250 LBS | DIASTOLIC BLOOD PRESSURE: 99 MMHG | SYSTOLIC BLOOD PRESSURE: 187 MMHG | TEMPERATURE: 99.6 F | HEART RATE: 78 BPM | BODY MASS INDEX: 46.01 KG/M2 | OXYGEN SATURATION: 95 % | RESPIRATION RATE: 14 BRPM | HEIGHT: 62 IN

## 2024-07-23 DIAGNOSIS — M54.41 CHRONIC MIDLINE LOW BACK PAIN WITH BILATERAL SCIATICA: Primary | ICD-10-CM

## 2024-07-23 DIAGNOSIS — G89.29 ACUTE EXACERBATION OF CHRONIC LOW BACK PAIN: Primary | ICD-10-CM

## 2024-07-23 DIAGNOSIS — M54.42 CHRONIC MIDLINE LOW BACK PAIN WITH BILATERAL SCIATICA: Primary | ICD-10-CM

## 2024-07-23 DIAGNOSIS — G89.29 CHRONIC MIDLINE LOW BACK PAIN WITH BILATERAL SCIATICA: Primary | ICD-10-CM

## 2024-07-23 DIAGNOSIS — N30.00 ACUTE CYSTITIS WITHOUT HEMATURIA: ICD-10-CM

## 2024-07-23 DIAGNOSIS — M54.32 SCIATICA OF LEFT SIDE: ICD-10-CM

## 2024-07-23 DIAGNOSIS — M54.50 ACUTE EXACERBATION OF CHRONIC LOW BACK PAIN: Primary | ICD-10-CM

## 2024-07-23 LAB
BILIRUB UR QL STRIP: NEGATIVE
CLARITY UR: CLEAR
COLOR UR: YELLOW
EPI CELLS #/AREA URNS HPF: ABNORMAL /HPF (ref 0–5)
GLUCOSE UR STRIP-MCNC: NEGATIVE MG/DL
HGB UR QL STRIP.AUTO: NEGATIVE
KETONES UR STRIP-MCNC: NEGATIVE MG/DL
LEUKOCYTE ESTERASE UR QL STRIP: ABNORMAL
NITRITE UR QL STRIP: NEGATIVE
PH UR STRIP: 6.5 [PH] (ref 5–8)
PROT UR STRIP-MCNC: NEGATIVE MG/DL
RBC #/AREA URNS HPF: ABNORMAL /HPF (ref 0–2)
SP GR UR STRIP: 1.02 (ref 1–1.03)
UROBILINOGEN UR STRIP-ACNC: NORMAL EU/DL (ref 0–1)
WBC #/AREA URNS HPF: ABNORMAL /HPF (ref 0–5)

## 2024-07-23 PROCEDURE — 81001 URINALYSIS AUTO W/SCOPE: CPT

## 2024-07-23 PROCEDURE — 6370000000 HC RX 637 (ALT 250 FOR IP)

## 2024-07-23 PROCEDURE — 87086 URINE CULTURE/COLONY COUNT: CPT

## 2024-07-23 PROCEDURE — 72131 CT LUMBAR SPINE W/O DYE: CPT

## 2024-07-23 PROCEDURE — 99422 OL DIG E/M SVC 11-20 MIN: CPT | Performed by: NURSE PRACTITIONER

## 2024-07-23 PROCEDURE — 99284 EMERGENCY DEPT VISIT MOD MDM: CPT | Performed by: EMERGENCY MEDICINE

## 2024-07-23 PROCEDURE — 6360000002 HC RX W HCPCS

## 2024-07-23 PROCEDURE — 96372 THER/PROPH/DIAG INJ SC/IM: CPT | Performed by: EMERGENCY MEDICINE

## 2024-07-23 RX ORDER — KETOROLAC TROMETHAMINE 30 MG/ML
30 INJECTION, SOLUTION INTRAMUSCULAR; INTRAVENOUS ONCE
Status: COMPLETED | OUTPATIENT
Start: 2024-07-23 | End: 2024-07-23

## 2024-07-23 RX ORDER — LIDOCAINE 4 G/G
1 PATCH TOPICAL ONCE
Status: DISCONTINUED | OUTPATIENT
Start: 2024-07-23 | End: 2024-07-23 | Stop reason: HOSPADM

## 2024-07-23 RX ORDER — ACETAMINOPHEN 500 MG
1000 TABLET ORAL ONCE
Status: COMPLETED | OUTPATIENT
Start: 2024-07-23 | End: 2024-07-23

## 2024-07-23 RX ORDER — CEPHALEXIN 500 MG/1
500 CAPSULE ORAL 4 TIMES DAILY
Qty: 20 CAPSULE | Refills: 0 | Status: SHIPPED | OUTPATIENT
Start: 2024-07-23 | End: 2024-07-28

## 2024-07-23 RX ORDER — CEPHALEXIN 250 MG/1
500 CAPSULE ORAL ONCE
Status: COMPLETED | OUTPATIENT
Start: 2024-07-23 | End: 2024-07-23

## 2024-07-23 RX ORDER — MORPHINE SULFATE 4 MG/ML
4 INJECTION, SOLUTION INTRAMUSCULAR; INTRAVENOUS ONCE
Status: COMPLETED | OUTPATIENT
Start: 2024-07-23 | End: 2024-07-23

## 2024-07-23 RX ADMIN — KETOROLAC TROMETHAMINE 30 MG: 30 INJECTION, SOLUTION INTRAMUSCULAR at 19:11

## 2024-07-23 RX ADMIN — ACETAMINOPHEN 1000 MG: 500 TABLET ORAL at 19:11

## 2024-07-23 RX ADMIN — CEPHALEXIN 500 MG: 250 CAPSULE ORAL at 20:58

## 2024-07-23 RX ADMIN — MORPHINE SULFATE 4 MG: 4 INJECTION INTRAVENOUS at 19:13

## 2024-07-23 ASSESSMENT — ENCOUNTER SYMPTOMS
NAUSEA: 0
CHEST TIGHTNESS: 0
SHORTNESS OF BREATH: 0
COUGH: 0
BACK PAIN: 1
DIARRHEA: 0
CONSTIPATION: 0
VOMITING: 0
ABDOMINAL PAIN: 0

## 2024-07-23 NOTE — TELEPHONE ENCOUNTER
Patient called stating she was seen in the ED twice for lower back pain. Xrays came back \"unchanged\" per patient. Patient requesting if PCP would be able to order an MRI for further imaging. Patient was last seen on 7/17/2024. Patient also requesting if a note for work can be written to excuse her from work tomorrow due to the pain she is in. Patient states that after tomorrow, she will have 3 days off additionally.    Writer informed patient that msg will be sent to PCP for further advice and office will contact her once PCP responds. Patient verbalized understanding    Please advise

## 2024-07-23 NOTE — ED PROVIDER NOTES
[facility] Emergency Department  [address]  [facility] [address]  Phone: [phone]  Fax: [phone]        Pt Name: Lorie Sharp  MRN: [id number]  Birthdate 1965  Date of evaluation: 7/23/24    CHIEFCOMPLAINT       Chief Complaint   Patient presents with    Back Pain    Leg Pain     Pt arrives with co lower back pain more radiating down left leg. Pt states she has been battling sciatica and has been evaluated several times however pt states she was told she needs an mri and has not been ordered one yet. Pt states today she did lose control of bladder 2 times today        HISTORY OF PRESENT ILLNESS (Location/Symptom, Timing/Onset, Context/Setting, Quality, Duration, Modifying Factors, Severity)      Lorie Sharp is a 59 y.o. female with pertinent PMH of lower back pain who presents to the ED via private auto with complaint of severe lower back pain. Patient has appointment with back surgeon, Dr Welsh in August. Has followed up with PCP since her last two ER visits for back pain, taking valium for muscle spasm with last dose at 1500. Patient also taking gabapentin, reports no relief with any medications taken for her pain. States she has urinary frequency with two episodes of urinary incontinence today, no burning with urination or hematuria. No injury or trauma. Pain worse with bearing weight on LLE. States pain radiates down back of left leg to knee, also reports decreased sensation to left leg. Patient ambulating with cane currently. Here for an MRI.    PAST MEDICAL / SURGICAL / SOCIAL / FAMILY HISTORY     PMH:  has a past medical history of Anxiety, Bundle branch block, Chronic back pain, Depression, History of radiofrequency ablation (RFA) procedure for cardiac arrhythmia, Hyperlipidemia, Hypertension, Mass of adrenal gland (HCC), Obesity, Solitary left kidney, Transient gluten sensitivity, and WPW (Emiliana-Parkinson-White syndrome).  Surgical History:  has a past

## 2024-07-23 NOTE — PROGRESS NOTES
Reviewed questionnaire    Reviewed meds/allergies    Dx Chronic lower back pain    Plan Work note given. Review MRI with Pain management. Follow up as needed  Rates pain as 10/10  Unable to move  Has lost control of urine, urinary frequency  Advised ER for eval    Time spent on visit 11 min

## 2024-07-24 NOTE — ED PROVIDER NOTES
[facility] EMERGENCY DEPARTMENT  eMERGENCY dEPARTMENT eNCOUnter   Independent Attestation     Pt Name: Lorie Sharp  MRN: [id number]  Birthdate 1965  Date of evaluation: 7/23/24       Lorie Sharp is a 59 y.o. female who presents with Back Pain and Leg Pain (Pt arrives with co lower back pain more radiating down left leg. Pt states she has been battling sciatica and has been evaluated several times however pt states she was told she needs an mri and has not been ordered one yet. Pt states today she did lose control of bladder 2 times today )        Based on the medical record, the care appears appropriate. I was personally available for consultation in the Emergency Department.    Jensen Cuellar DO  Attending Emergency  Physician              To, Jensen PRADO DO  07/23/24 2042

## 2024-07-24 NOTE — DISCHARGE INSTRUCTIONS
Take your medication as indicated and prescribed.      If you are given an antibiotic then, make sure you get the prescription filled and take the antibiotics until finished.  Drink plenty of water while taking the antibiotics.      For pain use acetaminophen (Tylenol) or ibuprofen (Motrin / Advil), unless prescribed medications that have acetaminophen or ibuprofen (or similar medications) in it.  You can take over the counter acetaminophen tablets (1 - 2 tablets of the 500-mg strength every 6 hours) or ibuprofen tablets (2 tablets every 4 hours).    PLEASE RETURN TO THE EMERGENCY DEPARTMENT IMMEDIATELY for worsening symptoms, inability to urinate, worsening of blood in your urine, or if you develop any concerning symptoms such as: high fever not relieved by acetaminophen (Tylenol) and/or ibuprofen (Motrin / Advil), chills, shortness of breath, chest pain, feeling of your heart fluttering or racing, persistent nausea and/or vomiting, vomiting up blood, blood in your stool, loss of consciousness, numbness, weakness or tingling in the arms or legs or change in color of the extremities, changes in mental status, persistent headache, blurry vision, loss of bladder / bowel.

## 2024-07-25 LAB
MICROORGANISM SPEC CULT: NORMAL
SERVICE CMNT-IMP: NORMAL
SPECIMEN DESCRIPTION: NORMAL

## 2024-07-29 ENCOUNTER — OFFICE VISIT (OUTPATIENT)
Dept: PAIN MANAGEMENT | Age: 59
End: 2024-07-29
Payer: COMMERCIAL

## 2024-07-29 VITALS — BODY MASS INDEX: 46.01 KG/M2 | WEIGHT: 250 LBS | HEIGHT: 62 IN

## 2024-07-29 DIAGNOSIS — M54.17 LUMBOSACRAL NEURITIS: ICD-10-CM

## 2024-07-29 DIAGNOSIS — G89.29 OTHER CHRONIC PAIN: ICD-10-CM

## 2024-07-29 DIAGNOSIS — M51.36 DEGENERATION OF LUMBAR INTERVERTEBRAL DISC: ICD-10-CM

## 2024-07-29 DIAGNOSIS — M54.50 CHRONIC LOW BACK PAIN, UNSPECIFIED BACK PAIN LATERALITY, UNSPECIFIED WHETHER SCIATICA PRESENT: ICD-10-CM

## 2024-07-29 DIAGNOSIS — G89.29 CHRONIC LOW BACK PAIN, UNSPECIFIED BACK PAIN LATERALITY, UNSPECIFIED WHETHER SCIATICA PRESENT: ICD-10-CM

## 2024-07-29 DIAGNOSIS — M47.817 LUMBOSACRAL SPONDYLOSIS WITHOUT MYELOPATHY: Primary | ICD-10-CM

## 2024-07-29 PROCEDURE — 99204 OFFICE O/P NEW MOD 45 MIN: CPT | Performed by: PAIN MEDICINE

## 2024-07-29 ASSESSMENT — ENCOUNTER SYMPTOMS
BOWEL INCONTINENCE: 0
BACK PAIN: 1

## 2024-07-29 NOTE — PROGRESS NOTES
HPI:     Back Pain  This is a chronic problem. The current episode started more than 1 year ago. The problem occurs constantly. The problem has been gradually worsening since onset. The pain is present in the lumbar spine. The quality of the pain is described as aching. The pain radiates to the left thigh. The pain is at a severity of 7/10. The pain is moderate. The pain is The same all the time. The symptoms are aggravated by bending, position, twisting, standing, lying down, sitting and coughing. Stiffness is present All day. Pertinent negatives include no bowel incontinence. She has tried home exercises, bed rest, walking, chiropractic manipulation, ice, heat, muscle relaxant, analgesics and NSAIDs for the symptoms.     X-ray lumbar spine degenerative changes.  CT scan of the lumbar spine with degenerative changes.  Has been walking with a cane.  Feels the pain has been getting worse lately.  Physical therapy without benefit.  MRI of the lumbar spine from 2021 with degenerative changes disc bulging.    Pain ranges from a 4/10 to a 10/10 depending on activity.    Patient denies any new neurological symptoms. No bowel or bladder incontinence, no weakness, and no falling.    Review of OARRS does not show any aberrant prescription behavior. Medication is helping the patient stay active. Patient denies any side effects and reports adequate analgesia. No sign of misuse/abuse.    Past Medical History:   Diagnosis Date    Anxiety     Bundle branch block     Chronic back pain     Depression     History of radiofrequency ablation (RFA) procedure for cardiac arrhythmia     for WPW    Hyperlipidemia     Hypertension     Mass of adrenal gland (HCC)     Obesity     Solitary left kidney     Transient gluten sensitivity 02/21/2019    WPW (Emiliana-Parkinson-White syndrome) 2009       Past Surgical History:   Procedure Laterality Date    ABLATION OF DYSRHYTHMIC FOCUS      x 2-3 due to WPW    APPENDECTOMY      ARM SURGERY

## 2024-08-02 ENCOUNTER — HOSPITAL ENCOUNTER (OUTPATIENT)
Dept: MRI IMAGING | Age: 59
Discharge: HOME | End: 2024-08-02
Attending: PAIN MEDICINE

## 2024-08-02 DIAGNOSIS — M51.36 DEGENERATION OF LUMBAR INTERVERTEBRAL DISC: ICD-10-CM

## 2024-08-02 DIAGNOSIS — M54.17 LUMBOSACRAL NEURITIS: ICD-10-CM

## 2024-08-02 DIAGNOSIS — M47.817 LUMBOSACRAL SPONDYLOSIS WITHOUT MYELOPATHY: ICD-10-CM

## 2024-08-02 PROCEDURE — 72148 MRI LUMBAR SPINE W/O DYE: CPT

## 2024-08-09 ENCOUNTER — HOSPITAL ENCOUNTER (OUTPATIENT)
Dept: PHYSICAL THERAPY | Facility: CLINIC | Age: 59
Setting detail: THERAPIES SERIES
Discharge: HOME | End: 2024-08-09
Attending: PAIN MEDICINE

## 2024-08-09 NOTE — FLOWSHEET NOTE
[] [facility]  Outpatient Rehabilitation &  Therapy  [address]  [phone]  [phone] [] [facility]  Outpatient Rehabilitation &  Therapy  [address]  P: [phone]  F: [phone] [x] [facility]  Outpatient Rehabilitation &  Therapy  [address]  P: [phone]  F: [phone] [] [facility]  Outpatient Rehabilitation &  Therapy  [address]  [phone]  [phone] [] [facility]  Outpatient Rehabilitation &  Therapy  [address]   P: [phone]  F: [phone]  [] [facility]  Outpatient Rehabilitation &  Therapy  [address]  P: [phone]  F: [phone] [] [facility]  Outpatient Rehabilitation &  Therapy  [address]  P: [phone]  F: [phone] [] [facility]  Outpatient Rehabilitation &  Therapy  [address]  P: [phone]  F: [phone] [] [facility]  Outpatient Rehabilitation &  Therapy  [address]  P: [phone]  F: [phone]  [] [facility] Outpatient Rehabilitation &  Therapy  [address]  P: [phone]  F: [phone]     Therapy Cancel/No Show note    Date: 2024  Patient: Lorie REYNOSO Aron  : 1965  MRN: [id number]    Cancels/No Shows to date: 1 eval cx    For today's appointment patient:    [x]  Cancelled    [] Rescheduled appointment    [] No-show     Reason given by patient:    []  Patient ill    []  Conflicting appointment    [] No transportation      [] Conflict with work    [] No reason given    [] Weather related    [] COVID-19    [x] Other:       Comments:  Patient stated she needs to cancel so she can get her insurance issues straightened out. Patient to call back to reschedule after her insurance is active again.      [] Next appointment

## 2024-08-09 NOTE — FLOWSHEET NOTE
[] [facility]  Outpatient Rehabilitation &  Therapy  [address]  [phone]  F: [phone] [] [facility]  Outpatient Rehabilitation &  Therapy  [address]   [address]  P: [phone]  F: [phone] [x] [facility]  Outpatient Rehabilitation &  Therapy  [address]  P: [phone]  F: [phone] [] [facility]  Outpatient Rehabilitation &  Therapy  [address]  P: [phone]  F: [phone] [] [facility]  Outpatient Rehabilitation &  Therapy  [address]   P: [phone]  F: [phone]  [] [facility]  Outpatient Rehabilitation &  Therapy  [address]   P: [phone]  F: [phone] [] [facility]  Outpatient Rehabilitation &  Therapy  [address]  P: [phone]  F: [phone] [] [facility]  Outpatient Rehabilitation &  Therapy  [address]  P: [phone]  F: [phone] [] [facility]  Outpatient Rehabilitation &  Therapy  [address]  P: [phone]  F: [phone]      Physical Therapy Spine Evaluation    Date:  2024  Patient: Lorie Sharp  : 1965  MRN: [id number]  Physician: Nazanin Howard MD   Medical Diagnosis:  Lumbosacral spondylosis without myelopathy (M47.817)  Lumbosacral neuritis (M54.17)  Degeneration of lumbar intervertebral disc (M51.36)        Documentation in error- patient cancelled.      Electronically signed by: Adwoa Solares, PT

## 2024-10-07 ENCOUNTER — HOSPITAL ENCOUNTER (EMERGENCY)
Age: 59
Discharge: HOME | End: 2024-10-07
Attending: STUDENT IN AN ORGANIZED HEALTH CARE EDUCATION/TRAINING PROGRAM
Payer: COMMERCIAL

## 2024-10-07 VITALS
HEIGHT: 62 IN | TEMPERATURE: 98 F | BODY MASS INDEX: 45.08 KG/M2 | WEIGHT: 245 LBS | RESPIRATION RATE: 20 BRPM | DIASTOLIC BLOOD PRESSURE: 136 MMHG | HEART RATE: 102 BPM | OXYGEN SATURATION: 94 % | SYSTOLIC BLOOD PRESSURE: 178 MMHG

## 2024-10-07 DIAGNOSIS — L02.92 BOILS OF MULTIPLE SITES: Primary | ICD-10-CM

## 2024-10-07 LAB
ALBUMIN SERPL-MCNC: 3.9 G/DL (ref 3.5–5.2)
ALBUMIN/GLOB SERPL: 1.1 {RATIO} (ref 1–2.5)
ALP SERPL-CCNC: 94 U/L (ref 35–104)
ALT SERPL-CCNC: 32 U/L (ref 5–33)
ANION GAP SERPL CALCULATED.3IONS-SCNC: 10 MMOL/L (ref 9–17)
AST SERPL-CCNC: 33 U/L
BASOPHILS # BLD: 0.1 K/UL (ref 0–0.2)
BASOPHILS NFR BLD: 1 % (ref 0–2)
BILIRUB DIRECT SERPL-MCNC: 0.1 MG/DL
BILIRUB INDIRECT SERPL-MCNC: 0.2 MG/DL (ref 0–1)
BILIRUB SERPL-MCNC: 0.3 MG/DL (ref 0.3–1.2)
BUN SERPL-MCNC: 16 MG/DL (ref 6–20)
CALCIUM SERPL-MCNC: 8.9 MG/DL (ref 8.6–10.4)
CHLORIDE SERPL-SCNC: 107 MMOL/L (ref 98–107)
CO2 SERPL-SCNC: 24 MMOL/L (ref 20–31)
CREAT SERPL-MCNC: 0.9 MG/DL (ref 0.5–0.9)
EOSINOPHIL # BLD: 0.8 K/UL (ref 0–0.4)
EOSINOPHILS RELATIVE PERCENT: 10 % (ref 1–4)
ERYTHROCYTE [DISTWIDTH] IN BLOOD BY AUTOMATED COUNT: 15.3 % (ref 12.5–15.4)
GFR, ESTIMATED: 75 ML/MIN/1.73M2
GLUCOSE SERPL-MCNC: 145 MG/DL (ref 70–99)
HCT VFR BLD AUTO: 43.6 % (ref 36–46)
HGB BLD-MCNC: 14.7 G/DL (ref 12–16)
LACTATE BLDV-SCNC: 1.2 MMOL/L (ref 0.5–2.2)
LIPASE SERPL-CCNC: 103 U/L (ref 13–60)
LYMPHOCYTES NFR BLD: 1.7 K/UL (ref 1–4.8)
LYMPHOCYTES RELATIVE PERCENT: 21 % (ref 24–44)
MCH RBC QN AUTO: 29.2 PG (ref 26–34)
MCHC RBC AUTO-ENTMCNC: 33.8 G/DL (ref 31–37)
MCV RBC AUTO: 86.5 FL (ref 80–100)
MONOCYTES NFR BLD: 0.5 K/UL (ref 0.1–1.2)
MONOCYTES NFR BLD: 6 % (ref 2–11)
NEUTROPHILS NFR BLD: 62 % (ref 36–66)
NEUTS SEG NFR BLD: 5 K/UL (ref 1.8–7.7)
PLATELET # BLD AUTO: 237 K/UL (ref 140–450)
PMV BLD AUTO: 8.7 FL (ref 6–12)
POTASSIUM SERPL-SCNC: 4.2 MMOL/L (ref 3.7–5.3)
PROT SERPL-MCNC: 7.5 G/DL (ref 6.4–8.3)
RBC # BLD AUTO: 5.04 M/UL (ref 4–5.2)
SODIUM SERPL-SCNC: 141 MMOL/L (ref 135–144)
WBC OTHER # BLD: 8 K/UL (ref 3.5–11)

## 2024-10-07 PROCEDURE — 87040 BLOOD CULTURE FOR BACTERIA: CPT

## 2024-10-07 PROCEDURE — 85025 COMPLETE CBC W/AUTO DIFF WBC: CPT

## 2024-10-07 PROCEDURE — 80048 BASIC METABOLIC PNL TOTAL CA: CPT

## 2024-10-07 PROCEDURE — 2580000003 HC RX 258: Performed by: STUDENT IN AN ORGANIZED HEALTH CARE EDUCATION/TRAINING PROGRAM

## 2024-10-07 PROCEDURE — 36415 COLL VENOUS BLD VENIPUNCTURE: CPT

## 2024-10-07 PROCEDURE — 6370000000 HC RX 637 (ALT 250 FOR IP): Performed by: STUDENT IN AN ORGANIZED HEALTH CARE EDUCATION/TRAINING PROGRAM

## 2024-10-07 PROCEDURE — 80076 HEPATIC FUNCTION PANEL: CPT

## 2024-10-07 PROCEDURE — 83690 ASSAY OF LIPASE: CPT

## 2024-10-07 PROCEDURE — 99284 EMERGENCY DEPT VISIT MOD MDM: CPT

## 2024-10-07 PROCEDURE — 2500000003 HC RX 250 WO HCPCS: Performed by: STUDENT IN AN ORGANIZED HEALTH CARE EDUCATION/TRAINING PROGRAM

## 2024-10-07 PROCEDURE — 83605 ASSAY OF LACTIC ACID: CPT

## 2024-10-07 PROCEDURE — 96365 THER/PROPH/DIAG IV INF INIT: CPT

## 2024-10-07 RX ORDER — DOXYCYCLINE HYCLATE 100 MG
100 TABLET ORAL 2 TIMES DAILY
Qty: 20 TABLET | Refills: 0 | Status: SHIPPED | OUTPATIENT
Start: 2024-10-07 | End: 2024-10-17

## 2024-10-07 RX ORDER — GINSENG 100 MG
CAPSULE ORAL ONCE
Status: COMPLETED | OUTPATIENT
Start: 2024-10-07 | End: 2024-10-07

## 2024-10-07 RX ORDER — BACITRACIN ZINC AND POLYMYXIN B SULFATE 500; 1000 [USP'U]/G; [USP'U]/G
OINTMENT TOPICAL
Qty: 15 G | Refills: 1 | Status: SHIPPED | OUTPATIENT
Start: 2024-10-07 | End: 2024-10-14

## 2024-10-07 RX ADMIN — BACITRACIN: 500 OINTMENT TOPICAL at 06:55

## 2024-10-07 RX ADMIN — DOXYCYCLINE 100 MG: 100 INJECTION, POWDER, LYOPHILIZED, FOR SOLUTION INTRAVENOUS at 06:04

## 2024-10-07 ASSESSMENT — PAIN SCALES - GENERAL: PAINLEVEL_OUTOF10: 4

## 2024-10-07 ASSESSMENT — PAIN DESCRIPTION - LOCATION: LOCATION: OTHER (COMMENT)

## 2024-10-07 ASSESSMENT — PAIN - FUNCTIONAL ASSESSMENT: PAIN_FUNCTIONAL_ASSESSMENT: 0-10

## 2024-10-07 NOTE — ED PROVIDER NOTES
[facility] EMERGENCY DEPARTMENT  Emergency Department Encounter  [facility]       Pt Name:Lorie Sharp  MRN: [id number]  Birthdate 1965  Date of evaluation: 10/7/24  PCP:  Marnie Laird APRN - CNP      CHIEF COMPLAINT       Chief Complaint   Patient presents with    Wound Infection     C/o wound to right lower abdomen X 2 weeks, \"boils all over my body\"       HISTORY OF PRESENT ILLNESS     Lorie Sharp is a 59 y.o. female who presents via personal vehicle with reports of boils.  Patient does have a history of boils.  She reports that she has not previously been diagnosed with MRSA although has previously responded to doxycycline that was ordered by her primary care provider.  She reports about 1 week of boils.  She reports that the worst boil is located on the right side of her abdomen.  She has not had surgical trickle incision of this previously.  This did spontaneously open up about 2 days ago.  Pus did come out of it.  She has had continued pus and bleeding from the wound and therefore came in for evaluation today.  She has had no fevers at home but has had chills.  She has not been taking Motrin and Tylenol or Tylenol as she has been concerned about her 1 kidney as well as her fatty liver.  She does follow with a primary care provider.  She has no other acute complaints at this time.  She has no  internal abdominal pain no nausea no vomiting.  She does not have a history of diabetes.  No chest pain.  No shortness of breath.  No cough.  No congestion.  She is tolerating oral intake.  She has no GI or  concerns.    PAST MEDICAL / SURGICAL / SOCIAL / FAMILY HISTORY      has a past medical history of Anxiety, Bundle branch block, Chronic back pain, Depression, History of radiofrequency ablation (RFA) procedure for cardiac arrhythmia, Hyperlipidemia, Hypertension, Mass of adrenal gland (HCC), Obesity, Solitary left kidney, Transient gluten sensitivity, and

## 2024-10-07 NOTE — DISCHARGE INSTRUCTIONS
SUMMARY OF YOUR VISIT    Today you were seen for abscess/boils.  We discussed that these do warrant antibiotic at this time, I provided you your first dose of antibiotic via IV here in the emergency department.  We discussed your labs, they were reassuring.  I am recommending oral antibiotics, I am also recommending topical antibiotic applied to the right abdominal wound twice daily until the wound resolves.      I have called your antibiotics into your pharmacy.  Please pick your prescription up and take it as prescribed for the full duration.  I recommend you follow-up with your primary care provider in 7 to 10 days.  As we discussed if you have any new, changing, worsening or changes to your symptoms I recommend return to the emerged apartment for reevaluation      Please continue to take your home medication as previously prescribed, I have made no changes to your home medications.        You can return to our or another Emergency Department as needed or for worsening symptoms of chest pain, shortness of breath, high fevers not relieved by acetaminophen (Tylenol) and/or ibuprofen (Motrin / Advil), chills, feeling of your heart fluttering or racing, persistent nausea and/or vomiting, vomiting up blood, blood in your stool, loss of consciousness, numbness, weakness or tingling in the arms or legs or change in color of the extremities, changes in mental status, persistent headache, blurry vision, loss of bladder / bowel control, if you are unable to follow up with your physician, or other any other care or concern.    Thank You!    On behalf of the Emergency Department staff and team, I would like to thank you for allowing us the opportunity to participate in your health care and evaluation today.

## 2024-10-12 LAB
MICROORGANISM SPEC CULT: NORMAL
MICROORGANISM SPEC CULT: NORMAL
SERVICE CMNT-IMP: NORMAL
SERVICE CMNT-IMP: NORMAL
SPECIMEN DESCRIPTION: NORMAL
SPECIMEN DESCRIPTION: NORMAL

## 2024-10-24 ENCOUNTER — HOSPITAL ENCOUNTER (OUTPATIENT)
Age: 59
Discharge: HOME | End: 2024-10-26
Payer: COMMERCIAL

## 2024-10-24 ENCOUNTER — OFFICE VISIT (OUTPATIENT)
Dept: PRIMARY CARE CLINIC | Age: 59
End: 2024-10-24
Payer: COMMERCIAL

## 2024-10-24 ENCOUNTER — HOSPITAL ENCOUNTER (OUTPATIENT)
Dept: GENERAL RADIOLOGY | Age: 59
Discharge: HOME | End: 2024-10-26
Payer: COMMERCIAL

## 2024-10-24 VITALS
HEIGHT: 62 IN | OXYGEN SATURATION: 98 % | RESPIRATION RATE: 15 BRPM | DIASTOLIC BLOOD PRESSURE: 98 MMHG | BODY MASS INDEX: 45.49 KG/M2 | SYSTOLIC BLOOD PRESSURE: 146 MMHG | HEART RATE: 75 BPM | WEIGHT: 247.2 LBS

## 2024-10-24 DIAGNOSIS — Z00.00 ENCOUNTER FOR GENERAL ADULT MEDICAL EXAMINATION W/O ABNORMAL FINDINGS: Primary | ICD-10-CM

## 2024-10-24 DIAGNOSIS — M25.511 ACUTE PAIN OF RIGHT SHOULDER: ICD-10-CM

## 2024-10-24 DIAGNOSIS — R06.02 SOB (SHORTNESS OF BREATH): ICD-10-CM

## 2024-10-24 PROBLEM — R19.7 DIARRHEA: Status: RESOLVED | Noted: 2018-11-29 | Resolved: 2024-10-24

## 2024-10-24 PROBLEM — K62.5 RECTAL BLEEDING: Status: RESOLVED | Noted: 2018-11-29 | Resolved: 2024-10-24

## 2024-10-24 PROCEDURE — 3080F DIAST BP >= 90 MM HG: CPT | Performed by: NURSE PRACTITIONER

## 2024-10-24 PROCEDURE — 73030 X-RAY EXAM OF SHOULDER: CPT

## 2024-10-24 PROCEDURE — 3077F SYST BP >= 140 MM HG: CPT | Performed by: NURSE PRACTITIONER

## 2024-10-24 PROCEDURE — 99396 PREV VISIT EST AGE 40-64: CPT | Performed by: NURSE PRACTITIONER

## 2024-10-24 RX ORDER — DOXYCYCLINE 100 MG/1
100 CAPSULE ORAL 2 TIMES DAILY
Qty: 20 CAPSULE | Refills: 0 | Status: SHIPPED | OUTPATIENT
Start: 2024-10-24 | End: 2024-11-03

## 2024-10-24 RX ORDER — METOPROLOL TARTRATE 100 MG/1
100 TABLET ORAL 2 TIMES DAILY
Qty: 60 TABLET | Refills: 3 | Status: SHIPPED | OUTPATIENT
Start: 2024-10-24

## 2024-10-24 SDOH — ECONOMIC STABILITY: FOOD INSECURITY: WITHIN THE PAST 12 MONTHS, YOU WORRIED THAT YOUR FOOD WOULD RUN OUT BEFORE YOU GOT MONEY TO BUY MORE.: NEVER TRUE

## 2024-10-24 SDOH — ECONOMIC STABILITY: FOOD INSECURITY: WITHIN THE PAST 12 MONTHS, THE FOOD YOU BOUGHT JUST DIDN'T LAST AND YOU DIDN'T HAVE MONEY TO GET MORE.: NEVER TRUE

## 2024-10-24 SDOH — ECONOMIC STABILITY: INCOME INSECURITY: HOW HARD IS IT FOR YOU TO PAY FOR THE VERY BASICS LIKE FOOD, HOUSING, MEDICAL CARE, AND HEATING?: NOT HARD AT ALL

## 2024-10-24 ASSESSMENT — PATIENT HEALTH QUESTIONNAIRE - PHQ9
6. FEELING BAD ABOUT YOURSELF - OR THAT YOU ARE A FAILURE OR HAVE LET YOURSELF OR YOUR FAMILY DOWN: NOT AT ALL
9. THOUGHTS THAT YOU WOULD BE BETTER OFF DEAD, OR OF HURTING YOURSELF: NOT AT ALL
2. FEELING DOWN, DEPRESSED OR HOPELESS: NOT AT ALL
SUM OF ALL RESPONSES TO PHQ QUESTIONS 1-9: 0
7. TROUBLE CONCENTRATING ON THINGS, SUCH AS READING THE NEWSPAPER OR WATCHING TELEVISION: NOT AT ALL
1. LITTLE INTEREST OR PLEASURE IN DOING THINGS: NOT AT ALL
SUM OF ALL RESPONSES TO PHQ9 QUESTIONS 1 & 2: 0
10. IF YOU CHECKED OFF ANY PROBLEMS, HOW DIFFICULT HAVE THESE PROBLEMS MADE IT FOR YOU TO DO YOUR WORK, TAKE CARE OF THINGS AT HOME, OR GET ALONG WITH OTHER PEOPLE: NOT DIFFICULT AT ALL
SUM OF ALL RESPONSES TO PHQ QUESTIONS 1-9: 0
5. POOR APPETITE OR OVEREATING: NOT AT ALL
4. FEELING TIRED OR HAVING LITTLE ENERGY: NOT AT ALL
8. MOVING OR SPEAKING SO SLOWLY THAT OTHER PEOPLE COULD HAVE NOTICED. OR THE OPPOSITE, BEING SO FIGETY OR RESTLESS THAT YOU HAVE BEEN MOVING AROUND A LOT MORE THAN USUAL: NOT AT ALL
3. TROUBLE FALLING OR STAYING ASLEEP: NOT AT ALL
SUM OF ALL RESPONSES TO PHQ QUESTIONS 1-9: 0
SUM OF ALL RESPONSES TO PHQ QUESTIONS 1-9: 0

## 2024-10-24 ASSESSMENT — ENCOUNTER SYMPTOMS
ABDOMINAL PAIN: 0
COUGH: 0
SHORTNESS OF BREATH: 1
BACK PAIN: 1

## 2024-10-24 NOTE — PROGRESS NOTES
Referral Type:   Eval and Treat     Referral Reason:   Specialty Services Required     Referred to Provider:   Chon Harrell MD     Requested Specialty:   Pulmonary Disease     Number of Visits Requested:   1    [place] - Vi Nettles MD, Orthopaedic Surgery, [place]     Referral Priority:   Routine     Referral Type:   Eval and Treat     Referral Reason:   Specialty Services Required     Referred to Provider:   Vi Nettles MD     Requested Specialty:   Orthopedic Surgery     Number of Visits Requested:   1    Full PFT Study With Bronchodilator     If an ABG is needed along with this PFT procedure, please place the appropriate lab order     Standing Status:   Future     Standing Expiration Date:   10/24/2025        Orders Placed This Encounter   Medications    doxycycline hyclate (VIBRAMYCIN) 100 MG capsule     Sig: Take 1 capsule by mouth 2 times daily for 10 days Take with food, but avoid dairy, calcium and MTV's 2 hours before and after the dose     Dispense:  20 capsule     Refill:  0    metoprolol tartrate (LOPRESSOR) 100 MG tablet     Sig: Take 1 tablet by mouth 2 times daily     Dispense:  60 tablet     Refill:  3       Patient given educational materials - see patient instructions.Discussed use, benefit, and side effects of prescribed medications.  All patientquestions answered. Pt voiced understanding. Reviewed health maintenance.  Instructedto continue current medications, diet and exercise.  Patient agreed with treatmentplan. Follow up as directed.     Electronicallysigned by DK VIZCARRA CNP on 10/24/2024 at 1:46 PM

## 2024-10-30 ENCOUNTER — OFFICE VISIT (OUTPATIENT)
Dept: ORTHOPEDIC SURGERY | Age: 59
End: 2024-10-30

## 2024-10-30 VITALS — HEIGHT: 62 IN | WEIGHT: 247 LBS | RESPIRATION RATE: 14 BRPM | BODY MASS INDEX: 45.45 KG/M2

## 2024-10-30 DIAGNOSIS — M75.81 RIGHT ROTATOR CUFF TENDONITIS: Primary | ICD-10-CM

## 2024-10-30 DIAGNOSIS — M25.511 RIGHT SHOULDER PAIN, UNSPECIFIED CHRONICITY: ICD-10-CM

## 2024-10-30 RX ORDER — LIDOCAINE HYDROCHLORIDE 10 MG/ML
3 INJECTION, SOLUTION INFILTRATION; PERINEURAL ONCE
Status: COMPLETED | OUTPATIENT
Start: 2024-10-30 | End: 2024-10-30

## 2024-10-30 RX ORDER — TRIAMCINOLONE ACETONIDE 40 MG/ML
40 INJECTION, SUSPENSION INTRA-ARTICULAR; INTRAMUSCULAR ONCE
Status: COMPLETED | OUTPATIENT
Start: 2024-10-30 | End: 2024-10-30

## 2024-10-30 RX ADMIN — TRIAMCINOLONE ACETONIDE 40 MG: 40 INJECTION, SUSPENSION INTRA-ARTICULAR; INTRAMUSCULAR at 14:41

## 2024-10-30 RX ADMIN — LIDOCAINE HYDROCHLORIDE 3 ML: 10 INJECTION, SOLUTION INFILTRATION; PERINEURAL at 14:40

## 2024-10-30 NOTE — PROGRESS NOTES
Orthopedic Shoulder Encounter Note     Chief complaint: right shoulder pain    HPI: Lorie Sharp is a 59 y.o.  right-hand dominant female who presents for evaluation of her right shoulder.  She indicates that she has been dealing with pain in the right shoulder for a couple of weeks now.  No precipitating trauma or injury.  It has progressively gotten worse.  He stuck she describes it as an achy dull sometimes stabbing pain primarily localized to the superior aspect of the shoulder.  She has increased pain with overhead movement or shoulder abduction.  No weakness.  It does interrupt her sleep.    Previous treatment:    NSAIDs:  Aleve, ibuprofen    Physical Therapy:  No    Injections: None    Surgeries: None    Review of Systems:   Constitutional: Negative for fever, chills, sweats.   Pain Score:   5  Neurological: Negative for headache, numbness, or weakness.   Musculoskeletal: As noted in HPI     Past Medical History  Lorie  has a past medical history of Anxiety, Bundle branch block, Chronic back pain, Depression, History of radiofrequency ablation (RFA) procedure for cardiac arrhythmia, Hyperlipidemia, Hypertension, Mass of adrenal gland (HCC), Obesity, Solitary left kidney, Transient gluten sensitivity, and WPW (Emiliana-Parkinson-White syndrome).    Past Surgical History  Lorie  has a past surgical history that includes Tonsillectomy; Appendectomy; Arm Surgery; ablation of dysrhythmic focus; Upper gastrointestinal endoscopy (N/A, 02/21/2019); Colonoscopy (N/A, 02/21/2019); and Cardiac surgery (2009 & 2010).    Current Medications  Current Outpatient Medications   Medication Sig Dispense Refill    doxycycline hyclate (VIBRAMYCIN) 100 MG capsule Take 1 capsule by mouth 2 times daily for 10 days Take with food, but avoid dairy, calcium and MTV's 2 hours before and after the dose 20 capsule 0    metoprolol tartrate (LOPRESSOR) 100 MG tablet Take 1 tablet by mouth 2 times daily 60 tablet 3    gabapentin (NEURONTIN)

## 2024-11-06 ENCOUNTER — HOSPITAL ENCOUNTER (OUTPATIENT)
Dept: PHYSICAL THERAPY | Facility: CLINIC | Age: 59
Setting detail: THERAPIES SERIES
Discharge: HOME | End: 2024-11-06
Attending: ORTHOPAEDIC SURGERY
Payer: COMMERCIAL

## 2024-11-06 PROCEDURE — 97161 PT EVAL LOW COMPLEX 20 MIN: CPT

## 2024-11-06 PROCEDURE — 97110 THERAPEUTIC EXERCISES: CPT

## 2024-11-06 NOTE — CONSULTS
[] [facility]  Outpatient Rehabilitation &  Therapy  [address]  [phone]  [phone] [] [facility]  Outpatient Rehabilitation &  Therapy  [address]  P: [phone]  F: [phone] [x] [facility]  Outpatient Rehabilitation &  Therapy  [address]  P: [phone]  F: [phone] [] [facility]  Outpatient Rehabilitation &  Therapy  [address]  [phone]  [phone] [] [facility]  Outpatient Rehabilitation &  Therapy  [address]   P: [phone]  F: [phone]  [] [facility]  Outpatient Rehabilitation &  Therapy  [address]  P: [phone]  F: [phone] [] [facility]  Outpatient Rehabilitation &  Therapy  [address]  P: [phone]  F: [phone] [] [facility]  Outpatient Rehabilitation &  Therapy  [address]  P: [phone]  F: [phone] [] [facility]  Outpatient Rehabilitation &  Therapy  [address]  P: [phone]  F: [phone]  [] [facility] Outpatient Rehabilitation &  Therapy  [address]  P: [phone]  F: [phone]     Physical Therapy Upper Extremity Evaluation    Date:  2024  Patient: Lorie Sharp  : 1965  MRN: [id number]  Physician: Vi Nettles MD   Insurance: UMR - Visits BMN - Auth After 25 VS - $30 co-pay  Medical Diagnosis: M75.81 - R RTC tendonitis  Rehab Codes: M25.51, M54.2, M79.622, R29.3  Onset Date: 10/1/24   Next Dr.'s appt: prn    Subjective:   CC/HPI: Patient reports with a one month history of R shoulder pain that occurred insidiously. Patient stated that she woke up one morning with pain in the arm and this pain has persisted over the last month. Patient recently had a cortisone shot to the R shoulder which brought her

## 2024-11-12 ENCOUNTER — HOSPITAL ENCOUNTER (OUTPATIENT)
Dept: PHYSICAL THERAPY | Facility: CLINIC | Age: 59
Setting detail: THERAPIES SERIES
Discharge: HOME | End: 2024-11-12
Attending: ORTHOPAEDIC SURGERY
Payer: COMMERCIAL

## 2024-11-12 NOTE — FLOWSHEET NOTE
[] [facility]  Outpatient Rehabilitation &  Therapy  [address]  [phone]  [phone] [] [facility]  Outpatient Rehabilitation &  Therapy  [address]  P: [phone]  F: [phone] [x] [facility]  Outpatient Rehabilitation &  Therapy  [address]  P: [phone]  F: [phone] [] [facility]  Outpatient Rehabilitation &  Therapy  [address]  [phone]  [phone] [] [facility]  Outpatient Rehabilitation &  Therapy  [address]   P: [phone]  F: [phone]  [] [facility]  Outpatient Rehabilitation &  Therapy  [address]  P: [phone]  F: [phone] [] [facility]  Outpatient Rehabilitation &  Therapy  [address]  P: [phone]  F: [phone] [] [facility]  Outpatient Rehabilitation &  Therapy  [address]  P: [phone]  F: [phone] [] [facility]  Outpatient Rehabilitation &  Therapy  [address]  P: [phone]  F: [phone]  [] [facility] Outpatient Rehabilitation &  Therapy  [address]  P: [phone]  F: [phone]     Therapy Cancel/No Show note    Date: 2024  Patient: Lorie REYNOSO Aron  : 1965  MRN: [id number]    Cancels/No Shows to date: 0    For today's appointment patient:    [x]  Cancelled    [] Rescheduled appointment    [] No-show     Reason given by patient:    []  Patient ill    []  Conflicting appointment    [] No transportation      [] Conflict with work    [x] No reason given    [] Weather related    [] COVID-19    [] Other:      Comments:        [x] Next appointment was confirmed    Electronically signed by: Surinder Quinn PT

## 2024-11-15 ENCOUNTER — HOSPITAL ENCOUNTER (OUTPATIENT)
Dept: PHYSICAL THERAPY | Facility: CLINIC | Age: 59
Setting detail: THERAPIES SERIES
Discharge: HOME | End: 2024-11-15
Attending: ORTHOPAEDIC SURGERY
Payer: COMMERCIAL

## 2024-11-15 PROCEDURE — 97110 THERAPEUTIC EXERCISES: CPT

## 2024-11-15 NOTE — FLOWSHEET NOTE
[] [facility]  Outpatient Rehabilitation &  Therapy  [address]  [phone]  [phone] [] [facility]  Outpatient Rehabilitation &  Therapy  [address]  P: [phone]  F: [phone] [x] [facility]  Outpatient Rehabilitation &  Therapy  [address]  P: [phone]  F: [phone] [] [facility]  Outpatient Rehabilitation &  Therapy  [address]  [phone]  [phone] [] [facility]  Outpatient Rehabilitation &  Therapy  [address]   P: [phone]  F: [phone]  [] [facility]  Outpatient Rehabilitation &  Therapy  [address]  P: [phone]  F: [phone] [] [facility]  Outpatient Rehabilitation &  Therapy  [address]  P: [phone]  F: [phone] [] [facility]  Outpatient Rehabilitation &  Therapy  [address]  P: [phone]  F: [phone] [] [facility]  Outpatient Rehabilitation &  Therapy  [address]  P: [phone]  F: [phone]  [] [facility] Outpatient Rehabilitation &  Therapy  [address]  P: [phone]  F: [phone]     Physical Therapy Daily Treatment Note    Date:  11/15/2024  Patient Name:  Lorie Sharp    :  1965  MRN: [id number]  Physician: Vi Nettles MD                                 Insurance: UMR - Visits BMN - Auth After 25 VS - $30 co-pay  Medical Diagnosis: M75.81 - R RTC tendonitis                   Rehab Codes: M25.51, M54.2, M79.622, R29.3  Onset Date: 10/1/24               Next 's appt: prn  Visit# / total visits:     Cancels/No Shows: 1    Subjective:    Pain:  [x] Yes  [] No Location: R shoulder Pain Rating: (0-10 scale) 2/10  Pain altered Tx:  [x] No  [] Yes  Action:  Comments: Patient reports with reduced R shoulder

## 2024-11-19 ENCOUNTER — HOSPITAL ENCOUNTER (OUTPATIENT)
Dept: PHYSICAL THERAPY | Facility: CLINIC | Age: 59
Setting detail: THERAPIES SERIES
Discharge: HOME | End: 2024-11-19
Attending: ORTHOPAEDIC SURGERY
Payer: COMMERCIAL

## 2024-11-19 NOTE — FLOWSHEET NOTE
[] [facility]  Outpatient Rehabilitation &  Therapy  [address]  [phone]  [phone] [] [facility]  Outpatient Rehabilitation &  Therapy  [address]  P: [phone]  F: [phone] [x] [facility]  Outpatient Rehabilitation &  Therapy  [address]  P: [phone]  F: [phone] [] [facility]  Outpatient Rehabilitation &  Therapy  [address]  [phone]  [phone] [] [facility]  Outpatient Rehabilitation &  Therapy  [address]   P: [phone]  F: [phone]  [] [facility]  Outpatient Rehabilitation &  Therapy  [address]  P: [phone]  F: [phone] [] [facility]  Outpatient Rehabilitation &  Therapy  [address]  P: [phone]  F: [phone] [] [facility]  Outpatient Rehabilitation &  Therapy  [address]  P: [phone]  F: [phone] [] [facility]  Outpatient Rehabilitation &  Therapy  [address]  P: [phone]  F: [phone]  [] [facility] Outpatient Rehabilitation &  Therapy  [address]  P: [phone]  F: [phone]     Therapy Cancel/No Show note    Date: 2024  Patient: Lorie REYNOSO Aron  : 1965  MRN: [id number]    Cancels/No Shows to date: 20    For today's appointment patient:    [x]  Cancelled    [] Rescheduled appointment    [] No-show     Reason given by patient:    []  Patient ill    []  Conflicting appointment    [] No transportation      [x] Conflict with work    [] No reason given    [] Weather related    [] COVID-19    [] Other:      Comments:        [] Next appointment was confirmed    Electronically signed by: Surinder Quinn PT

## 2024-12-02 ENCOUNTER — HOSPITAL ENCOUNTER (EMERGENCY)
Age: 59
Discharge: HOME | End: 2024-12-02
Attending: STUDENT IN AN ORGANIZED HEALTH CARE EDUCATION/TRAINING PROGRAM
Payer: COMMERCIAL

## 2024-12-02 VITALS
DIASTOLIC BLOOD PRESSURE: 78 MMHG | SYSTOLIC BLOOD PRESSURE: 176 MMHG | RESPIRATION RATE: 20 BRPM | TEMPERATURE: 97.9 F | HEART RATE: 63 BPM | OXYGEN SATURATION: 96 % | WEIGHT: 247 LBS | HEIGHT: 62 IN | BODY MASS INDEX: 45.45 KG/M2

## 2024-12-02 DIAGNOSIS — S39.012A STRAIN OF LUMBAR REGION, INITIAL ENCOUNTER: Primary | ICD-10-CM

## 2024-12-02 PROCEDURE — 6360000002 HC RX W HCPCS: Performed by: NURSE PRACTITIONER

## 2024-12-02 PROCEDURE — 96372 THER/PROPH/DIAG INJ SC/IM: CPT

## 2024-12-02 PROCEDURE — 99284 EMERGENCY DEPT VISIT MOD MDM: CPT

## 2024-12-02 RX ORDER — DEXAMETHASONE SODIUM PHOSPHATE 10 MG/ML
10 INJECTION, SOLUTION INTRAMUSCULAR; INTRAVENOUS ONCE
Status: COMPLETED | OUTPATIENT
Start: 2024-12-02 | End: 2024-12-02

## 2024-12-02 RX ORDER — DIAZEPAM 5 MG/1
5 TABLET ORAL EVERY 12 HOURS PRN
Qty: 10 TABLET | Refills: 0 | Status: SHIPPED | OUTPATIENT
Start: 2024-12-02 | End: 2024-12-07

## 2024-12-02 RX ORDER — PREDNISONE 20 MG/1
60 TABLET ORAL DAILY
Qty: 15 TABLET | Refills: 0 | Status: SHIPPED | OUTPATIENT
Start: 2024-12-02 | End: 2024-12-07

## 2024-12-02 RX ORDER — ORPHENADRINE CITRATE 30 MG/ML
60 INJECTION INTRAMUSCULAR; INTRAVENOUS ONCE
Status: COMPLETED | OUTPATIENT
Start: 2024-12-02 | End: 2024-12-02

## 2024-12-02 RX ADMIN — ORPHENADRINE CITRATE 60 MG: 30 INJECTION, SOLUTION INTRAMUSCULAR; INTRAVENOUS at 10:02

## 2024-12-02 RX ADMIN — DEXAMETHASONE SODIUM PHOSPHATE 10 MG: 10 INJECTION, SOLUTION INTRAMUSCULAR; INTRAVENOUS at 10:02

## 2024-12-02 ASSESSMENT — PAIN SCALES - GENERAL
PAINLEVEL_OUTOF10: 5
PAINLEVEL_OUTOF10: 8
PAINLEVEL_OUTOF10: 5

## 2024-12-02 ASSESSMENT — PAIN DESCRIPTION - LOCATION
LOCATION: BACK

## 2024-12-02 ASSESSMENT — PAIN - FUNCTIONAL ASSESSMENT
PAIN_FUNCTIONAL_ASSESSMENT: 0-10
PAIN_FUNCTIONAL_ASSESSMENT: 0-10

## 2024-12-02 NOTE — DISCHARGE INSTRUCTIONS
Home.  Follow-up with your primary care physician in the next 1 to 2 days.  Alternate ice and heat.  Use Tylenol and Motrin as needed to help with pain.  Valium to help with muscle spasms as you have used previously.  Please do not use this while driving.  Follow-up closely with pain management for your back.    Avoid excessive heavy lifting, twisting, bending until pain has resolved.  Consider referral to physical therapy to help with proper lifting techniques.    Return immediately for pain that is not controlled, numbness tingling, weakness, abdominal pain, pain that worsens or is not improving, or any other worsening symptoms or concerns.

## 2024-12-02 NOTE — ED PROVIDER NOTES
[facility] Emergency Department  [address]  [place] [address]  Phone: [phone]  Fax: [phone]      Pt Name: Lorie Sharp  MRN: [id number]  Birthdate 1965  Date of evaluation: 12/2/24    CHIEF COMPLAINT       Chief Complaint   Patient presents with    Back Pain       PAST MEDICAL HISTORY    has a past medical history of Anxiety, Bundle branch block, Chronic back pain, Depression, History of radiofrequency ablation (RFA) procedure for cardiac arrhythmia, Hyperlipidemia, Hypertension, Mass of adrenal gland (HCC), Obesity, Solitary left kidney, Transient gluten sensitivity, and WPW (Emiliana-Parkinson-White syndrome).    SURGICAL HISTORY      has a past surgical history that includes Tonsillectomy; Appendectomy; Arm Surgery; ablation of dysrhythmic focus; Upper gastrointestinal endoscopy (N/A, 02/21/2019); Colonoscopy (N/A, 02/21/2019); and Cardiac surgery (2009 & 2010).    CURRENT MEDICATIONS       Previous Medications    ATORVASTATIN (LIPITOR) 20 MG TABLET    Take 1 tablet by mouth daily    BUPROPION (WELLBUTRIN SR) 150 MG EXTENDED RELEASE TABLET    Take 1 tablet by mouth 2 times daily    GABAPENTIN (NEURONTIN) 300 MG CAPSULE    Take 1 capsule by mouth 3 times daily for 180 days. Intended supply: 30 days    HANDICAP PLACARD MISC    by Does not apply route Expires 7/2025    METOPROLOL TARTRATE (LOPRESSOR) 100 MG TABLET    Take 1 tablet by mouth 2 times daily    NYSTATIN (MYCOSTATIN) 581295 UNIT/GM POWDER    Apply topically 3 times daily Apply 3 times daily.       ALLERGIES     is allergic to gluten meal and pcn [penicillins].    Vitals:    12/02/24 0935   BP: (!) 176/78   Pulse: 63   Resp: 20   Temp: 97.9 °F (36.6 °C)   TempSrc: Oral   SpO2: 96%   Weight: 112 kg (247 lb)   Height: 1.575 m (5' 2\")            FINAL IMPRESSION      1. Strain of lumbar region, initial encounter          DISPOSITION/PLAN   DISPOSITION Decision To Discharge 12/02/2024 09:58:14 AM

## 2024-12-02 NOTE — ED PROVIDER NOTES
[facility] EMERGENCY DEPARTMENT  EMERGENCY DEPARTMENT ENCOUNTER      Pt Name: Lorie Sharp  MRN: [id number]  Birthdate 1965  Date of evaluation: 12/2/2024  Provider: DK Carrasco CNP  10:08 AM    CHIEF COMPLAINT       Chief Complaint   Patient presents with    Back Pain         HISTORY OF PRESENT ILLNESS    Lorie Sharp is a 59 y.o. female who presents to the emergency department for evaluation of low back pain.  Patient states she was at work today and she twisted to do talk to coworker and had immediate back pain she sat down.  She was improving and then went to the refrigerator and bent down to get something out of the fridge and immediately got stuck in the bent position.  Her boss told her to come in for evaluation but she does not wish to seek Workmen's Comp.  She denies injury or fall.  She states that pain is actually improved a little.  She is used to having back spasms she went to see pain management.  She treats with Valium for muscle spasms.  Has been given a supply of 90 from her family doctor in July for similar episode and has 1 left.  She uses them sparingly.  She denies any numbness tingling weakness.  She reports that she constantly has pain in the left low sciatic area that is not new.  The patient denies any numbness tingling weakness of the legs or difficulty walking.    HPI    Nursing Notes were reviewed.    REVIEW OF SYSTEMS       Review of Systems   All other systems reviewed and are negative.      Except as noted above the remainder of the review of systems was reviewed and negative.       PAST MEDICAL HISTORY     Past Medical History:   Diagnosis Date    Anxiety     Bundle branch block     Chronic back pain     Depression     History of radiofrequency ablation (RFA) procedure for cardiac arrhythmia     for WPW    Hyperlipidemia     Hypertension     Mass of adrenal gland (HCC)     Obesity     Solitary left kidney     Transient gluten sensitivity

## 2025-01-08 ENCOUNTER — CLINICAL DOCUMENTATION (OUTPATIENT)
Dept: PHYSICAL THERAPY | Facility: CLINIC | Age: 60
End: 2025-01-08

## 2025-01-08 NOTE — DISCHARGE SUMMARY
attended appt and our cancellation/no show policy. Thank you for your referral.       Treatment to Date:  [x] Therapeutic Exercise    [] Modalities:  [] Therapeutic Activity    [] Ultrasound  [] Electrical Stimulation  [] Gait Training     [] Massage       [] Lumbar/Cervical Traction  [] Neuromuscular Re-education [] Cold/hotpack [] Iontophoresis: 4 mg/mL  [x] Instruction in Home Exercise Program                     Dexamethasone Sodium  [] Manual Therapy             Phosphate 40-80 mAmin  [] Aquatic Therapy                   [] Vasocompression/    [] Other:             Game Ready    Discharge Status:     [] Pt recovered from conditions. Treatment goals were met.    [] Pt received maximum benefit. No further therapy indicated at this time.    [x] Pt to continue exercise/home instructions independently.    [x] Therapy interrupted due to: length of time since last attended appt.     [x] Pt has 2 or more no shows/cancels, is discontinued per our policy.    [] Pt has completed prescribed number of treatment sessions.    [] Other:         Electronically signed by Surinedr Quinn PT on 1/8/2025 at 3:39 PM      If you have any questions or concerns, please don't hesitate to call.  Thank you for your referral.

## (undated) DEVICE — Z INACTIVE USE 2525529 CONNECTOR TBNG FOR O2

## (undated) DEVICE — Z DUPLICATE USE 2527422 TUBING O2 STD 7 FT EXTN NO CRUSH VISUAL CNTRST LF

## (undated) DEVICE — GOWN,POLY REINFORCED,LG: Brand: MEDLINE

## (undated) DEVICE — JELLY,LUBE,STERILE,FLIP TOP,TUBE,2-OZ: Brand: MEDLINE

## (undated) DEVICE — DEFENDO AIR WATER SUCTION AND BIOPSY VALVE KIT FOR  OLYMPUS: Brand: DEFENDO AIR/WATER/SUCTION AND BIOPSY VALVE

## (undated) DEVICE — CANNULA NSL AD TBNG L7FT PVC STR NONFLARED PRNG O2 DEL W STD

## (undated) DEVICE — BITEBLOCK 54FR W/ DENT RIM BLOX

## (undated) DEVICE — SYRINGE MED 50ML LUERSLIP TIP

## (undated) DEVICE — Z DISCONTINUED USE 2424143 ADAPTER O2 SWVL CHRISTMAS TREE GRN

## (undated) DEVICE — ERBE NESSY® OMEGA PLATE USA (85+23)CM² , WITH CABLE 3 M: Brand: ERBE

## (undated) DEVICE — FORCEPS BX L L240CM DIA2.4MM RAD JAW 4 HOT FOR POLYP DISP

## (undated) DEVICE — FORCEPS BX L240CM JAW DIA2.8MM L CAP W/ NDL MIC MESH TOOTH